# Patient Record
Sex: FEMALE | Race: WHITE | Employment: PART TIME | URBAN - METROPOLITAN AREA
[De-identification: names, ages, dates, MRNs, and addresses within clinical notes are randomized per-mention and may not be internally consistent; named-entity substitution may affect disease eponyms.]

---

## 2018-01-18 NOTE — MISCELLANEOUS
Chago Mehta,    It has come to my attention that we have lost follow-up/contact with you  Because you have an increase chance of developing breast cancer, we recommend that you have examinations by a physician or advanced practitioner twice a year  I understand that  this follow-up is sometimes inconvenient, but the [employer] recommends it as a standard of care  Please contact our office to coordinate this follow-up or let us know if you are under the care of another physician              Electronically signed by:Robert Killian MD  Aug 30 2016  9:31AM EST

## 2019-11-12 ENCOUNTER — TELEPHONE (OUTPATIENT)
Dept: SURGICAL ONCOLOGY | Facility: CLINIC | Age: 64
End: 2019-11-12

## 2019-11-12 NOTE — TELEPHONE ENCOUNTER
New Patient Encounter    New Patient Intake Form   Patient Details:  Sherrill Duke  1955  [id number]    Background Information:  [address] starts by opening a telephone encounter and gathering the following information   Who is calling to schedule? If not self, relationship to patient? 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 Hx of    Is patient aware of diagnosis? Yes   Reason for visit? History Of   Have you had any testing done? If so: when, where? Yes   Are records in [employer]? yes   Was the patient told to bring a disk? no   Scheduling Information:   Preferred [place]:  Any     Requesting Specific Provider? no   Are there any dates/time the patient cannot be seen? no      Miscellaneous: na   After completing the above information, please route to Financial Counselor and the appropriate Nurse Navigator for review

## 2019-11-18 ENCOUNTER — CONSULT (OUTPATIENT)
Dept: SURGICAL ONCOLOGY | Facility: CLINIC | Age: 64
End: 2019-11-18
Payer: COMMERCIAL

## 2019-11-18 VITALS
DIASTOLIC BLOOD PRESSURE: 90 MMHG | WEIGHT: 140.6 LBS | HEART RATE: 98 BPM | BODY MASS INDEX: 21.31 KG/M2 | TEMPERATURE: 97.4 F | HEIGHT: 68 IN | SYSTOLIC BLOOD PRESSURE: 178 MMHG

## 2019-11-18 DIAGNOSIS — Z80.3 FAMILY HISTORY OF BREAST CANCER: ICD-10-CM

## 2019-11-18 DIAGNOSIS — N64.4 BREAST PAIN: Primary | ICD-10-CM

## 2019-11-18 PROCEDURE — 99244 OFF/OP CNSLTJ NEW/EST MOD 40: CPT | Performed by: NURSE PRACTITIONER

## 2019-11-18 RX ORDER — EPINEPHRINE 0.3 MG/.3ML
INJECTION SUBCUTANEOUS
COMMUNITY
Start: 2011-04-25

## 2019-11-18 RX ORDER — BRIMONIDINE TARTRATE, TIMOLOL MALEATE 2; 5 MG/ML; MG/ML
SOLUTION/ DROPS OPHTHALMIC
COMMUNITY

## 2019-11-18 NOTE — PROGRESS NOTES
Surgical Oncology Follow Up       [address] [facility] [place] [place]  [facility] [address]    Cassandra Gould  1955  [id number]      Chief Complaint   Patient presents with    Consult       Assessment/Plan:  1  Breast pain  - Mammo diagnostic bilateral w 3d & cad; Future  - US breast left limited (diagnostic); Future  - 3 mo f/u visit  - Wear a supportive bra  Sports bras work well  - Decrease salty food intake  - Decrease caffeine intake  - NSAIDS for pain as needed  - Ice or heat/ massage  - Decrease smoking  - Call with worsening symptoms    2  Family history of breast cancer  - patient reports she had genetic testing performed in the past at [facility] as part of a study- states she received indeterminate results of the BRCA 1 gene but has not received any further updates  -encourage patient to attempt to obtain genetic testing results and then patient could possibly be tested with panel testing given her family history of sister being diagnosed with breast cancer at age 36 and a maternal grandmother diagnosed at an unknown but young age    Discussion/Summary:   Patient is a 36-year-old female with a family history of breast cancer and a personal history of left breast atypia who presents today with complaints left breast pain x2 weeks  She has not had any recent breast imaging  Her exam is somewhat difficult given her numerous scars and presumed scar tissue  I do not appreciate a dominant mass or any lymphadenopathy  I recommended a diagnostic bilateral mammogram and a left breast ultrasound for further evaluation  In regards to her family history, her sister was diagnosed at age 36  She is alive and well  The sister did not undergo genetic testing however the patient did undergo genetic testing as part of a trial several years ago at [facility]  She states that her results were indeterminate for BRCA 1 gene mutation    She reports that she has not received any further updates since that time  I have encouraged her to see if she is able to acquire her results  She would most likely benefit from panel testing  I will plan to see her back in 3 months to reassess her Genetics as well as her breast pain  I recommended conservative measures at this time  If her imaging is negative, I suspect that her pain is primarily from scar tissue  I did instruct her to call if she appreciates any further changes or has any other concerns  She is in agreement with this plan  All of her questions were answered today  History of Present Illness:     -Interval History:   Patient is a 52-year-old female who previously followed with Dr Frank Bobby for atypia  (2003) and mastodynia who has been lost to follow-up is presents today with new complaints of left breast pain  This has been ongoing for approximately 2 weeks  Ibuprofen and a well fitting bra helps with the pain  She is unsure if there are any changes on her self exam as she states she has had several surgeries and has scarring of the left breast   She has no recent breast imaging  Her last  mammogram was performed in November of 2015 and was BI-RADS 2, category 3 density  Menarche age 14, 1 pregnancies, 3 live births  She was 26 at the time her 1st child was born  She has used birth control pills in the past for about 15 years  She has never used hormone replacement  She has undergone a bilateral oophorectomy  She has no personal history of cancer aside from an early stage melanoma  Her sister was diagnosed with breast cancer at age 36 and her maternal grandmother was also diagnosed with breast cancer  She has a paternal aunt with a breast cancer diagnosis at age 80  The patient reports that she underwent BRCA genetic testing in the past which was indeterminate  She is not of Ashkenazi [place] descent  She is a current smoker and has smoked for 25 years    She is currently smoking a quarter pack per day   She consumes approximately 3 alcoholic beverages per week  Review of Systems:  Review of Systems   Constitutional: Negative for activity change, appetite change, chills, fatigue, fever and unexpected weight change  HENT: Negative for trouble swallowing  Eyes: Negative for pain, redness and visual disturbance  Respiratory: Negative for cough, shortness of breath and wheezing  Cardiovascular: Negative for chest pain, palpitations and leg swelling  Gastrointestinal: Negative for abdominal pain, constipation, diarrhea, nausea and vomiting  Endocrine: Negative for cold intolerance and heat intolerance  Musculoskeletal: Negative for arthralgias, back pain, gait problem and myalgias  Skin: Negative for color change and rash  Neurological: Negative for dizziness, syncope, light-headedness, numbness and headaches  Hematological: Negative for adenopathy  Psychiatric/Behavioral: Negative for agitation and confusion  All other systems reviewed and are negative  Patient Active Problem List   Diagnosis    Breast pain     History reviewed  No pertinent past medical history    Past Surgical History:   Procedure Laterality Date    DILATION AND CURETTAGE OF UTERUS      EYE SURGERY       Family History   Problem Relation Age of Onset    Breast cancer Sister     Breast cancer Paternal Aunt     Breast cancer Maternal Grandmother      Social History     Socioeconomic History    Marital status: Unknown     Spouse name: Not on file    Number of children: Not on file    Years of education: Not on file    Highest education level: Not on file   Occupational History    Not on file   Social Needs    Financial resource strain: Not on file    Food insecurity:     Worry: Not on file     Inability: Not on file    Transportation needs:     Medical: Not on file     Non-medical: Not on file   Tobacco Use    Smoking status: Current Every Day Smoker     Packs/day: 0 25     Years: 25 00     Pack years: 6 25     Types: Cigarettes    Smokeless tobacco: Never Used   Substance and Sexual Activity    Alcohol use: Yes     Frequency: 2-3 times a week     Drinks per session: 1 or 2    Drug use: Not on file    Sexual activity: Not on file   Lifestyle    Physical activity:     Days per week: Not on file     Minutes per session: Not on file    Stress: Not on file   Relationships    Social connections:     Talks on phone: Not on file     Gets together: Not on file     Attends Holiness service: Not on file     Active member of club or organization: Not on file     Attends meetings of clubs or organizations: Not on file     Relationship status: Not on file    Intimate partner violence:     Fear of current or ex partner: Not on file     Emotionally abused: Not on file     Physically abused: Not on file     Forced sexual activity: Not on file   Other Topics Concern    Not on file   Social History Narrative    Not on file       Current Outpatient Medications:     brimonidine-timolol (COMBIGAN) 0 2-0 5 %, Apply to eye, Disp: , Rfl:     EPINEPHrine (EPIPEN) 0 3 mg/0 3 mL SOAJ, Inject as directed, Disp: , Rfl:   Allergies   Allergen Reactions    Bee Venom Anaphylaxis     Vitals:    11/18/19 1334   BP: (!) 178/90   Pulse: 98   Temp: (!) 97 4 °F (36 3 °C)       Physical Exam   Constitutional: She is oriented to person, place, and time  Vital signs are normal  She appears well-developed and well-nourished  No distress  HENT:   Head: Normocephalic and atraumatic  Neck: Normal range of motion  Cardiovascular: Normal rate, regular rhythm and normal heart sounds  Pulmonary/Chest: Effort normal and breath sounds normal    Bilateral breasts were examined in the sitting and supine position  Left breast with several surgical scars present, primarily in the upper outer quadrant  There is presumed scar tissue present but I cannot r/o underlying pathology  No nipple changes or nipple discharge   There are no right breast masses, skin nodules, nipple changes or nipple discharge  There is no bilateral supraclavicular or axillary lymphadenopathy noted  Abdominal: Soft  Normal appearance  She exhibits no mass  There is no hepatosplenomegaly  There is no tenderness  Musculoskeletal: Normal range of motion  Lymphadenopathy:     She has no axillary adenopathy  Right: No supraclavicular adenopathy present  Left: No supraclavicular adenopathy present  Neurological: She is alert and oriented to person, place, and time  Skin: Skin is warm, dry and intact  No rash noted  She is not diaphoretic  Psychiatric: She has a normal mood and affect  Her speech is normal    Vitals reviewed  Advance Care Planning/Advance Directives:  Discussed disease status and treatment goals with the patient

## 2019-11-22 ENCOUNTER — HOSPITAL ENCOUNTER (OUTPATIENT)
Dept: ULTRASOUND IMAGING | Facility: CLINIC | Age: 64
Discharge: HOME | End: 2019-11-22
Payer: COMMERCIAL

## 2019-11-22 ENCOUNTER — HOSPITAL ENCOUNTER (OUTPATIENT)
Dept: MAMMOGRAPHY | Facility: CLINIC | Age: 64
Discharge: HOME | End: 2019-11-22
Payer: COMMERCIAL

## 2019-11-22 VITALS — BODY MASS INDEX: 21.22 KG/M2 | WEIGHT: 140 LBS | HEIGHT: 68 IN

## 2019-11-22 DIAGNOSIS — N64.4 BREAST PAIN: ICD-10-CM

## 2019-11-22 PROCEDURE — 77066 DX MAMMO INCL CAD BI: CPT

## 2019-11-22 PROCEDURE — 76642 ULTRASOUND BREAST LIMITED: CPT

## 2019-11-22 PROCEDURE — G0279 TOMOSYNTHESIS, MAMMO: HCPCS

## 2020-05-15 ENCOUNTER — TELEPHONE (OUTPATIENT)
Dept: SURGICAL ONCOLOGY | Facility: CLINIC | Age: 65
End: 2020-05-15

## 2020-05-27 ENCOUNTER — TELEPHONE (OUTPATIENT)
Dept: CARDIAC SURGERY | Facility: CLINIC | Age: 65
End: 2020-05-27

## 2021-03-01 DIAGNOSIS — Z23 ENCOUNTER FOR IMMUNIZATION: ICD-10-CM

## 2023-05-09 ENCOUNTER — HOSPITAL ENCOUNTER (OUTPATIENT)
Dept: RADIOLOGY | Facility: CLINIC | Age: 68
Discharge: HOME | End: 2023-05-09

## 2023-05-09 ENCOUNTER — OFFICE VISIT (OUTPATIENT)
Dept: URGENT CARE | Facility: CLINIC | Age: 68
End: 2023-05-09

## 2023-05-09 VITALS
DIASTOLIC BLOOD PRESSURE: 106 MMHG | BODY MASS INDEX: 18.05 KG/M2 | HEIGHT: 67 IN | WEIGHT: 115 LBS | SYSTOLIC BLOOD PRESSURE: 180 MMHG | HEART RATE: 78 BPM | OXYGEN SATURATION: 97 % | RESPIRATION RATE: 16 BRPM

## 2023-05-09 DIAGNOSIS — R07.81 RIB PAIN: Primary | ICD-10-CM

## 2023-05-09 DIAGNOSIS — R07.81 RIB PAIN: ICD-10-CM

## 2023-05-09 NOTE — LETTER
May 9, 2023     Patient: Kalpesh Cook   YOB: 1955   Date of Visit: 5/9/2023       To Whom It May Concern: It is my medical opinion that Kalpesh Cook may return to work on 5/12/2023  If you have any questions or concerns, please don't hesitate to call           Sincerely,        Noé Horta PA-C    CC: No Recipients

## 2023-05-09 NOTE — PROGRESS NOTES
[address]        NAME: Astrid Hobson is a 78 y o  female  : 1955    MRN: [id number]  DATE: May 9, 2023  TIME: 6:18 PM    Assessment and Plan   Rib pain [R07 81]  1  Rib pain  XR ribs right w pa chest min 3 views        Preliminary xray read by myself  No acute osseous abnormality noted  Possible fracture at rib 7 which could be old injury from December  It is not where she is having pain at this time  Pending radiologist final read  Information for PCP provided to follow up about high blood pressure  Patient Instructions     Heat/ice as needed   Tylenol/ibuprofen as needed for pain   Follow up with PCP in 3-5 days  Proceed to  ER if symptoms worsen  Chief Complaint     Chief Complaint   Patient presents with   • rib pain     Injured right posterior rib around oscar and today the exact pain returned and feels as if right lower rib is poking out         History of Present Illness       Patient is presenting today for right lower rib pain x 1 day  She stated she injured her right posterior rib around Oscar and today the exact same pain returned and stated that it feels like her right lower rib is poking out  Woke up this morning and had this sharp pain at her ribs  Doesn't recall any falls or injury  Denies any SOB or CP  Review of Systems   Review of Systems   Constitutional: Negative for activity change, chills, diaphoresis, fatigue and fever  Respiratory: Negative for chest tightness, shortness of breath and wheezing  Cardiovascular: Negative for chest pain, palpitations and leg swelling  Genitourinary: Negative  Musculoskeletal: Negative for back pain, gait problem, joint swelling, neck pain and neck stiffness  Right lower rib pain   Skin: Negative  Neurological: Negative for dizziness, tremors, syncope, weakness, light-headedness and numbness           Current Medications       Current Outpatient Medications:   •  brimonidine-timolol (COMBIGAN) "0 2-0 5 %, Apply to eye, Disp: , Rfl:   •  EPINEPHrine (EPIPEN) 0 3 mg/0 3 mL SOAJ, Inject as directed, Disp: , Rfl:     Current Allergies     Allergies as of 05/09/2023 - Reviewed 05/09/2023   Allergen Reaction Noted   • Bee venom Anaphylaxis 04/16/2019            The following portions of the patient's history were reviewed and updated as appropriate: allergies, current medications, past family history, past medical history, past social history, past surgical history and problem list      Past Medical History:   Diagnosis Date   • Glaucoma    • Iris nevus        Past Surgical History:   Procedure Laterality Date   • BREAST BIOPSY     • DILATION AND CURETTAGE OF UTERUS     • EYE SURGERY     • HYSTERECTOMY         Family History   Problem Relation Age of Onset   • Alzheimer's disease Mother    • Breast cancer Sister    • No Known Problems Sister    • No Known Problems Sister    • No Known Problems Daughter    • No Known Problems Maternal Aunt    • Breast cancer Paternal Aunt    • Breast cancer Maternal Grandmother          Medications have been verified  Objective   BP (!) 180/106   Pulse 78   Resp 16   Ht 5' 7\" (1 702 m)   Wt 52 2 kg (115 lb)   SpO2 97%   BMI 18 01 kg/m²        Physical Exam     Physical Exam  Constitutional:       Appearance: Normal appearance  HENT:      Head: Normocephalic  Eyes:      Extraocular Movements: Extraocular movements intact  Pupils: Pupils are equal, round, and reactive to light  Cardiovascular:      Rate and Rhythm: Normal rate and regular rhythm  Pulses: Normal pulses  Heart sounds: Normal heart sounds  Pulmonary:      Effort: Pulmonary effort is normal       Breath sounds: Normal breath sounds  Musculoskeletal:         General: Tenderness (right lower ribs) present  No swelling, deformity or signs of injury  Normal range of motion  Cervical back: Normal range of motion  No rigidity or tenderness  Skin:     General: Skin is warm and dry   " Capillary Refill: Capillary refill takes less than 2 seconds  Findings: No bruising, erythema or lesion  Neurological:      General: No focal deficit present  Mental Status: She is alert and oriented to person, place, and time  Mental status is at baseline  Psychiatric:         Mood and Affect: Mood normal          Behavior: Behavior normal          Thought Content:  Thought content normal          Judgment: Judgment normal

## 2023-05-09 NOTE — PATIENT INSTRUCTIONS
Heat/ice as needed   Tylenol/ibuprofen as needed for pain   Follow up with PCP in 3-5 days  Proceed to  ER if symptoms worsen

## 2023-05-10 ENCOUNTER — OFFICE VISIT (OUTPATIENT)
Dept: FAMILY MEDICINE CLINIC | Facility: CLINIC | Age: 68
End: 2023-05-10

## 2023-05-10 VITALS
BODY MASS INDEX: 18.06 KG/M2 | TEMPERATURE: 96.2 F | DIASTOLIC BLOOD PRESSURE: 100 MMHG | HEART RATE: 73 BPM | SYSTOLIC BLOOD PRESSURE: 170 MMHG | WEIGHT: 115.08 LBS | HEIGHT: 67 IN | OXYGEN SATURATION: 99 %

## 2023-05-10 DIAGNOSIS — Z12.11 SCREENING FOR COLORECTAL CANCER: ICD-10-CM

## 2023-05-10 DIAGNOSIS — Z82.49 FAMILY HISTORY OF EARLY CAD: ICD-10-CM

## 2023-05-10 DIAGNOSIS — Z72.0 TOBACCO USE: ICD-10-CM

## 2023-05-10 DIAGNOSIS — Z11.59 NEED FOR HEPATITIS C SCREENING TEST: ICD-10-CM

## 2023-05-10 DIAGNOSIS — Z12.31 SCREENING MAMMOGRAM FOR BREAST CANCER: ICD-10-CM

## 2023-05-10 DIAGNOSIS — I10 ESSENTIAL (PRIMARY) HYPERTENSION: ICD-10-CM

## 2023-05-10 DIAGNOSIS — I10 ESSENTIAL HYPERTENSION: ICD-10-CM

## 2023-05-10 DIAGNOSIS — D31.42 NEVUS OF IRIS OF LEFT EYE: ICD-10-CM

## 2023-05-10 DIAGNOSIS — H40.10X0 OPEN-ANGLE GLAUCOMA OF LEFT EYE, UNSPECIFIED GLAUCOMA STAGE, UNSPECIFIED OPEN-ANGLE GLAUCOMA TYPE: ICD-10-CM

## 2023-05-10 DIAGNOSIS — Z12.12 SCREENING FOR COLORECTAL CANCER: ICD-10-CM

## 2023-05-10 DIAGNOSIS — R07.81 RIB PAIN ON RIGHT SIDE: ICD-10-CM

## 2023-05-10 DIAGNOSIS — R03.0 ELEVATED BP WITHOUT DIAGNOSIS OF HYPERTENSION: Primary | ICD-10-CM

## 2023-05-10 DIAGNOSIS — J30.2 SEASONAL ALLERGIES: ICD-10-CM

## 2023-05-10 RX ORDER — AMLODIPINE BESYLATE 10 MG/1
10 TABLET ORAL DAILY
Qty: 30 TABLET | Refills: 5 | Status: SHIPPED | OUTPATIENT
Start: 2023-05-10

## 2023-05-10 NOTE — PROGRESS NOTES
Assessment/Plan:       1  Elevated BP without diagnosis of hypertension  -     Lipid panel; Future    2  Screening mammogram for breast cancer  -     Mammo screening bilateral w 3d & cad; Future; Expected date: 05/10/2023    3  Screening for colorectal cancer  -     Ambulatory Referral to Gastroenterology; Future; Expected date: 08/10/2023    4  Tobacco use    5  Open-angle glaucoma of left eye, unspecified glaucoma stage, unspecified open-angle glaucoma type    6  Seasonal allergies    7  Family history of early CAD  -     Lipid panel; Future    8  BMI less than 19,adult    9  Need for hepatitis C screening test  -     Hepatitis C Antibody; Future    10  Essential hypertension  -     Comprehensive metabolic panel; Future  -     amLODIPine (NORVASC) 10 mg tablet; Take 1 tablet (10 mg total) by mouth daily    11  Rib pain on right side    12  Essential (primary) hypertension  -     Lipid panel; Future    13  Nevus of iris of left eye      58-year-old female who is establishing care here at [facility] primary care  Patient was recently seen in urgent care due to ongoing right rib pain  In December 2022, patient was cleaning her house and struck the right side of her ribs against a table  Fracture was confirmed  She recently had a flare of the rib pain  X-rays showed healing fracture with callus formation of the right rib fracture with no new fracture being seen  Patient previously had a DEXA scan in June 2021 showing normal bone density  When seen in urgent care, patient's blood pressure was elevated above 948 systolically  In the office today she was 170/100 and repeated was 182/92  As part of shared decision making, the patient is willing to start amlodipine 10 mg nightly  She has not had any labs drawn for quite some time as she has not had any primary care services  She is willing to have a lot of screening test done including mammography, lipid panel, hepatitis C, CMP, and colonoscopy    Patient's sister was diagnosed with breast cancer at age 36  The sister opted out of getting BRCA testing  The patient did have genetic test at [facility] and was found to have indeterminant BRCA1 and BRCA2  She had been following with an oncologist here at [place] [address] and was having mammography is done on a regular basis  Due to COVID, she has not had a mammography since November 2020 but she is agreeing to have a mammography at this time  She has no breast complaints at this time  Patient continues to smoke  She is only smoking 3 to 4 cigarettes/day and she is in the precontemplation stage of stopping smoking  Patient continues to have poor vision in the left eye  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  She is able to use her right eye as needed for vision  She denies any difficulty driving but drives very little at night  Patient's BMI was 18  She has been thin all of her life  She states that she does get adequate calories  She has not lost any weight and her weight has been steady at 115 pounds for quite some time  BMI is below normal  Dietary education for weight gain was provided to the patient today  He is going to try to have dense calorie foods  We are checking her albumin and protein level on her CMP  A total of 62 minutes was spent rendering care for this patient  This time included review of the patient's electronic medical record, performing the history and physical, reviewing appropriate labs and/or images, developing a treatment and assessment plan, answering patient's questions and concerns, and documenting the patient visit  We will see the patient in 1 month to do her welcome to Medicare visit  Subjective:      Patient ID: Mikey Drummond is a 78 y o  female  HPI: This 72-year-old female is 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    This requires a lot of walking up and down stairs and squatting  She did have problems with patellar tendinitis in 2019 for which she saw Dr Ivelisse Morel  She is not having consistent pain just once in a while she would have pain which is relieved by taking a hot bath  She has no swelling in her knees  Her joints are holding up rather well  Patient never had a high blood pressure in the past   There is no family history of hypertension but there is some congenital heart disease in her sister affecting the mitral valve but the sister never went through any additional testing after she had had surgery when she was in third grade  Patient has not had problems with her heart in the past   No angina  She had never had a high blood pressure readings until most recently and her readings are now consistently elevated  Patient is willing to take her blood pressure at home by buying a new blood pressure cuff  She will report her blood pressures on a regular basis 2 or 3 times per week so that we can dose adjust the amlodipine or add additional therapy  I did prepare her for the great possibility that we would need to increase her blood pressure medication with a different agent if these blood pressure readings did not normalize  Patient denies chest pain heart palpitations dizziness lightheadedness or syncope  She continues to have rib pain on the right side but it is intermittent  Pain is not pleuritic  She has some point tenderness on the posterior ribs on the right side  Patient denies any melena or hematochezia  She denies any unintentional weight loss  She denies any diarrhea or constipation  She denies difficulty passing her water  She denies any breast pain or nipple discharge      The following portions of the patient's history were reviewed and updated as appropriate: allergies, current medications, past family history, past medical history, past social history, past surgical history, and problem list     Review of Systems  No "recent URI or viral syndrome  Very little swelling in her lower extremities  She is smoking 3 to 4 cigarettes/day and is in the precontemplation stage of stopping smoking  Objective:      /100 (BP Location: Right arm, Patient Position: Sitting)   Pulse 73   Temp (!) 96 2 °F (35 7 °C) (Tympanic)   Ht 5' 7\" (1 702 m)   Wt 52 2 kg (115 lb 1 3 oz)   SpO2 99%   BMI 18 02 kg/m²          Physical Exam  Well-developed well-nourished 78y o  year old female who is cooperative with the exam   Patient is alert and oriented x3  Patient is appropriate in answering all questions  HEENT:  Normocephalic  PERRLA  EOMs intact  TMs are clear with identification of bony landmarks  No tragus or pinnae tenderness  No pre or posterior auricular adenopathy  Sinuses without tenderness  Throat without hyperemia  Neck:  Supple without adenopathy  Thyroid midline without thyromegaly or bruits  No carotid bruits  Chest symmetric and nontender  Barrel chested noted  She was aerating her bases without difficulty  Was able to speak in full sentences  Does have tenderness on palpation on the posterior right ribs  Tenderness was present generally and not as a point tenderness  Heart regular rate and rhythm  No murmur rubs or gallops  Point of maximum impulse not displaced  Lungs are clear to auscultation  Breathing is nonlabored  Aerating bases well  Abdomen round and soft positive bowel sounds without masses tenderness or organomegaly  Extremities reveal adequate peripheral pulses without peripheral edema  Mild varicosities noted in both ankle areas    "

## 2023-05-11 ENCOUNTER — TELEPHONE (OUTPATIENT)
Dept: URGENT CARE | Facility: CLINIC | Age: 68
End: 2023-05-11

## 2023-05-11 NOTE — TELEPHONE ENCOUNTER
Left message for patient that there was a ninth rib fracture seen that was healing, most likely from December's injury, but there was no acute fracture  Advised if she has any questions or concerns to call the office

## 2023-06-14 ENCOUNTER — HOSPITAL ENCOUNTER (OUTPATIENT)
Dept: RADIOLOGY | Facility: CLINIC | Age: 68
Discharge: HOME | End: 2023-06-14
Payer: MEDICARE

## 2023-06-14 VITALS — HEIGHT: 67 IN | WEIGHT: 115.08 LBS | BODY MASS INDEX: 18.06 KG/M2

## 2023-06-14 DIAGNOSIS — Z12.31 SCREENING MAMMOGRAM FOR BREAST CANCER: ICD-10-CM

## 2023-06-14 PROCEDURE — 77063 BREAST TOMOSYNTHESIS BI: CPT

## 2023-06-14 PROCEDURE — 77067 SCR MAMMO BI INCL CAD: CPT

## 2023-06-20 ENCOUNTER — TELEPHONE (OUTPATIENT)
Dept: FAMILY MEDICINE CLINIC | Facility: CLINIC | Age: 68
End: 2023-06-20

## 2023-06-26 ENCOUNTER — OFFICE VISIT (OUTPATIENT)
Dept: FAMILY MEDICINE CLINIC | Facility: CLINIC | Age: 68
End: 2023-06-26
Payer: MEDICARE

## 2023-06-26 VITALS
HEART RATE: 93 BPM | TEMPERATURE: 98.2 F | OXYGEN SATURATION: 100 % | DIASTOLIC BLOOD PRESSURE: 76 MMHG | WEIGHT: 112.66 LBS | BODY MASS INDEX: 17.68 KG/M2 | HEIGHT: 67 IN | SYSTOLIC BLOOD PRESSURE: 138 MMHG

## 2023-06-26 DIAGNOSIS — Z23 NEED FOR VACCINATION AGAINST STREPTOCOCCUS PNEUMONIAE USING PNEUMOCOCCAL CONJUGATE VACCINE 13: ICD-10-CM

## 2023-06-26 DIAGNOSIS — Z23 ENCOUNTER FOR IMMUNIZATION: ICD-10-CM

## 2023-06-26 DIAGNOSIS — Z12.12 SCREENING FOR COLORECTAL CANCER: ICD-10-CM

## 2023-06-26 DIAGNOSIS — I10 ESSENTIAL HYPERTENSION: ICD-10-CM

## 2023-06-26 DIAGNOSIS — Z00.00 MEDICARE ANNUAL WELLNESS VISIT, INITIAL: Primary | ICD-10-CM

## 2023-06-26 DIAGNOSIS — M80.071K: ICD-10-CM

## 2023-06-26 DIAGNOSIS — M76.52 PATELLAR TENDINITIS OF LEFT KNEE: ICD-10-CM

## 2023-06-26 DIAGNOSIS — Z12.11 SCREENING FOR COLORECTAL CANCER: ICD-10-CM

## 2023-06-26 DIAGNOSIS — H40.051 INCREASED INTRAOCULAR PRESSURE, RIGHT: ICD-10-CM

## 2023-06-26 DIAGNOSIS — M81.0 AGE-RELATED OSTEOPOROSIS WITHOUT CURRENT PATHOLOGICAL FRACTURE: ICD-10-CM

## 2023-06-26 DIAGNOSIS — H25.9 SENILE CATARACT OF RIGHT EYE, UNSPECIFIED AGE-RELATED CATARACT TYPE: ICD-10-CM

## 2023-06-26 PROCEDURE — 90677 PCV20 VACCINE IM: CPT

## 2023-06-26 PROCEDURE — G0402 INITIAL PREVENTIVE EXAM: HCPCS | Performed by: PHYSICIAN ASSISTANT

## 2023-06-26 PROCEDURE — G0009 ADMIN PNEUMOCOCCAL VACCINE: HCPCS

## 2023-06-26 NOTE — PROGRESS NOTES
Assessment and Plan:     Problem List Items Addressed This Visit    None       Preventive health issues were discussed with patient, and age appropriate screening tests were ordered as noted in patient's After Visit Summary  Personalized health advice and appropriate referrals for health education or preventive services given if needed, as noted in patient's After Visit Summary       History of Present Illness:     Patient presents for a Medicare Wellness Visit    HPI   Patient Care Team:  Marlen Cullen PA-C as PCP - General (Physician Assistant)  Ryan Spence MD     Review of Systems:     Review of Systems     Problem List:     Patient Active Problem List   Diagnosis   • Breast pain   • Family history of breast cancer      Past Medical and Surgical History:     Past Medical History:   Diagnosis Date   • BRCA1 negative     patient states that BRCA 1 came back indeterminate   • BRCA2 negative    • Glaucoma    • Iris nevus    • Melanoma, intraocular, iris, left ([facility] [address] ) 12/21/2006    iris nevus tumor removed     Past Surgical History:   Procedure Laterality Date   • BREAST EXCISIONAL BIOPSY Left 1975    age 26; benign lesion   • BREAST EXCISIONAL BIOPSY Left 01/04/2000    atypical hyperplasia   • BREAST EXCISIONAL BIOPSY Left 01/30/2002    atypical intraductal hyperplasia   • BREAST EXCISIONAL BIOPSY Left 04/17/2002    intraductal epithelial hyperplasia and papillomatosis with focal atypia   • BREAST EXCISIONAL BIOPSY Left 02/25/2003    foci of atypical intraductal hyperplasia   • BREAST EXCISIONAL BIOPSY Left 06/05/2006    fibrocystic change/fibrous mastopathy   • BREAST EXCISIONAL BIOPSY Left 02/25/2008    fibrocystic changes   • DILATION AND CURETTAGE OF UTERUS     • EYE SURGERY Left 12/21/2006   • HYSTERECTOMY  02/2000   • OOPHORECTOMY Bilateral 2013    approximately   • [address] ADDITIONAL Left 04/28/2008    benign breast tissue with stromal fibrosis and sclerosing adenosis   • US GUIDED BREAST BIOPSY LEFT COMPLETE Left 04/28/2008    benign breast tissue with stromal fibrosis and sclerosing adenosis      Family History:     Family History   Problem Relation Age of Onset   • Alzheimer's disease Mother    • No Known Problems Father    • Breast cancer Sister 43   • No Known Problems Sister    • No Known Problems Sister    • No Known Problems Daughter    • Breast cancer Maternal Grandmother 45   • No Known Problems Maternal Grandfather    • No Known Problems Paternal Grandmother    • No Known Problems Paternal Grandfather    • No Known Problems Maternal Aunt    • No Known Problems Maternal Aunt    • Breast cancer Paternal Aunt         80   • Breast cancer Cousin 40   • Breast cancer Cousin 39   • Breast cancer additional onset Neg Hx    • BRCA2 Positive Neg Hx    • BRCA2 Negative Neg Hx    • BRCA1 Positive Neg Hx    • BRCA1 Negative Neg Hx    • BRCA 1/2 Neg Hx    • Colon cancer Neg Hx    • Ovarian cancer Neg Hx    • Endometrial cancer Neg Hx       Social History:     Social History     Socioeconomic History   • Marital status: /Civil Union     Spouse name: None   • Number of children: None   • Years of education: None   • Highest education level: None   Occupational History   • None   Tobacco Use   • Smoking status: Every Day     Packs/day: 0 25     Years: 25 00     Total pack years: 6 25     Types: Cigarettes   • Smokeless tobacco: Never   Substance and Sexual Activity   • Alcohol use:  Yes   • Drug use: None   • Sexual activity: None   Other Topics Concern   • None   Social History Narrative   • None     Social Determinants of Health     Financial Resource Strain: Not on file   Food Insecurity: Not on file   Transportation Needs: Not on file   Physical Activity: Not on file   Stress: Not on file   Social Connections: Not on file   Intimate Partner Violence: Not on file   Housing Stability: Not on file      Medications and Allergies:     Current Outpatient Medications   Medication Sig Dispense Refill   • amLODIPine (NORVASC) 10 mg tablet Take 1 tablet (10 mg total) by mouth daily 30 tablet 5   • brimonidine-timolol (COMBIGAN) 0 2-0 5 % Apply to eye     • EPINEPHrine (EPIPEN) 0 3 mg/0 3 mL SOAJ Inject as directed     • prednisoLONE acetate (PRED MILD) 0 12 % ophthalmic suspension 1 drop 4 (four) times a day       No current facility-administered medications for this visit  Allergies   Allergen Reactions   • Bee Venom Anaphylaxis   • Dayquil [Pseudoephedrine-Dm-Gg-Apap] Other (See Comments)      Immunizations:     Immunization History   Administered Date(s) Administered   • INFLUENZA 12/18/2007, 12/16/2009, 11/08/2011, 01/16/2013, 01/17/2018   • Pneumococcal Polysaccharide PPV23 05/22/2017   • Tdap 11/08/2011, 02/28/2012      Health Maintenance:         Topic Date Due   • Hepatitis C Screening  Never done   • Colorectal Cancer Screening  Never done   • Breast Cancer Screening: Mammogram  06/14/2024         Topic Date Due   • COVID-19 Vaccine (1) Never done   • Pneumococcal Vaccine: 65+ Years (2 - PCV) 05/22/2018   • Influenza Vaccine (Season Ended) 09/01/2023      Medicare Screening Tests and Risk Assessments:         Health Risk Assessment:   Patient rates overall health as very good  Patient feels that their physical health rating is same  Patient is very satisfied with their life  Eyesight was rated as slightly worse  Hearing was rated as same  Patient feels that their emotional and mental health rating is same  Patients states they are never, rarely angry  Patient states they are never, rarely unusually tired/fatigued  Pain experienced in the last 7 days has been none  Patient states that she has experienced no weight loss or gain in last 6 months  Fall Risk Screening: In the past year, patient has experienced: no history of falling in past year      Urinary Incontinence Screening:   Patient has not leaked urine accidently in the last six months       Home Safety:  Patient does not "have trouble with stairs inside or outside of their home  Patient has working smoke alarms and has no working carbon monoxide detector  Home safety hazards include: none  Medications:   Patient is currently taking over-the-counter supplements  OTC medications include: see medication list  Patient is able to manage medications  Activities of Daily Living (ADLs)/Instrumental Activities of Daily Living (IADLs):   Walk and transfer into and out of bed and chair?: Yes  Dress and groom yourself?: Yes    Bathe or shower yourself?: Yes    Feed yourself? Yes  Do your laundry/housekeeping?: No  Manage your money, pay your bills and track your expenses?: Yes  Make your own meals?: Yes    Do your own shopping?: Yes    Previous Hospitalizations:   Any hospitalizations or ED visits within the last 12 months?: No      PREVENTIVE SCREENINGS        Breast Cancer Screening:     General: Screening Current      Cervical Cancer Screening:    General: Screening Not Indicated      Lung Cancer Screening:     General: Screening Not Indicated    No results found       Physical Exam:     /76 (BP Location: Right arm, Patient Position: Sitting, Cuff Size: Standard)   Pulse 93   Temp 98 2 °F (36 8 °C) (Tympanic)   Ht 5' 7\" (1 702 m)   Wt 51 1 kg (112 lb 10 5 oz)   SpO2 100%   BMI 17 64 kg/m²     Physical Exam     Ruth Cardenas PA-C  "

## 2023-06-26 NOTE — PATIENT INSTRUCTIONS
Medicare Preventive Visit Patient Instructions  Thank you for completing your Welcome to Medicare Visit or Medicare Annual Wellness Visit today  Your next wellness visit will be due in one year (6/26/2024)  The screening/preventive services that you may require over the next 5-10 years are detailed below  Some tests may not apply to you based off risk factors and/or age  Screening tests ordered at today's visit but not completed yet may show as past due  Also, please note that scanned in results may not display below  Preventive Screenings:  Service Recommendations Previous Testing/Comments   Colorectal Cancer Screening  * Colonoscopy    * Fecal Occult Blood Test (FOBT)/Fecal Immunochemical Test (FIT)  * Fecal DNA/Cologuard Test  * Flexible Sigmoidoscopy Age: 39-70 years old   Colonoscopy: every 10 years (may be performed more frequently if at higher risk)  OR  FOBT/FIT: every 1 year  OR  Cologuard: every 3 years  OR  Sigmoidoscopy: every 5 years  Screening may be recommended earlier than age 39 if at higher risk for colorectal cancer  Also, an individualized decision between you and your healthcare provider will decide whether screening between the ages of 74-80 would be appropriate  Colonoscopy: Not on file  FOBT/FIT: Not on file  Cologuard: Not on file  Sigmoidoscopy: Not on file          Breast Cancer Screening Age: 36 years old  Frequency: every 1-2 years  Not required if history of left and right mastectomy Mammogram: 06/14/2023    Screening Current   Cervical Cancer Screening Between the ages of 21-29, pap smear recommended once every 3 years  Between the ages of 33-67, can perform pap smear with HPV co-testing every 5 years     Recommendations may differ for women with a history of total hysterectomy, cervical cancer, or abnormal pap smears in past  Pap Smear: Not on file    Screening Not Indicated   Hepatitis C Screening Once for adults born between 1945 and 1965  More frequently in patients at high risk for Hepatitis C Hep C Antibody: Not on file        Diabetes Screening 1-2 times per year if you're at risk for diabetes or have pre-diabetes Fasting glucose: No results in last 5 years (No results in last 5 years)  A1C: No results in last 5 years (No results in last 5 years)      Cholesterol Screening Once every 5 years if you don't have a lipid disorder  May order more often based on risk factors  Lipid panel: Not on file          Other Preventive Screenings Covered by Medicare:  1  Abdominal Aortic Aneurysm (AAA) Screening: covered once if your at risk  You're considered to be at risk if you have a family history of AAA  2  Lung Cancer Screening: covers low dose CT scan once per year if you meet all of the following conditions: (1) Age 50-69; (2) No signs or symptoms of lung cancer; (3) Current smoker or have quit smoking within the last 15 years; (4) You have a tobacco smoking history of at least 20 pack years (packs per day multiplied by number of years you smoked); (5) You get a written order from a healthcare provider  3  Glaucoma Screening: covered annually if you're considered high risk: (1) You have diabetes OR (2) Family history of glaucoma OR (3)  aged 48 and older OR (3)  American aged 72 and older  3  Osteoporosis Screening: covered every 2 years if you meet one of the following conditions: (1) You're estrogen deficient and at risk for osteoporosis based off medical history and other findings; (2) Have a vertebral abnormality; (3) On glucocorticoid therapy for more than 3 months; (4) Have primary hyperparathyroidism; (5) On osteoporosis medications and need to assess response to drug therapy  · Last bone density test (DXA Scan): Not on file  5  HIV Screening: covered annually if you're between the age of 12-76  Also covered annually if you are younger than 13 and older than 72 with risk factors for HIV infection   For pregnant patients, it is covered up to 3 times per pregnancy  Immunizations:  Immunization Recommendations   Influenza Vaccine Annual influenza vaccination during flu season is recommended for all persons aged >= 6 months who do not have contraindications   Pneumococcal Vaccine   * Pneumococcal conjugate vaccine = PCV13 (Prevnar 13), PCV15 (Vaxneuvance), PCV20 (Prevnar 20)  * Pneumococcal polysaccharide vaccine = PPSV23 (Pneumovax) Adults 25-60 years old: 1-3 doses may be recommended based on certain risk factors  Adults 72 years old: 1-2 doses may be recommended based off what pneumonia vaccine you previously received   Hepatitis B Vaccine 3 dose series if at intermediate or high risk (ex: diabetes, end stage renal disease, liver disease)   Tetanus (Td) Vaccine - COST NOT COVERED BY MEDICARE PART B Following completion of primary series, a booster dose should be given every 10 years to maintain immunity against tetanus  Td may also be given as tetanus wound prophylaxis  Tdap Vaccine - COST NOT COVERED BY MEDICARE PART B Recommended at least once for all adults  For pregnant patients, recommended with each pregnancy  Shingles Vaccine (Shingrix) - COST NOT COVERED BY MEDICARE PART B  2 shot series recommended in those aged 48 and above     Health Maintenance Due:      Topic Date Due   • Hepatitis C Screening  Never done   • Colorectal Cancer Screening  Never done   • Breast Cancer Screening: Mammogram  06/14/2024     Immunizations Due:      Topic Date Due   • COVID-19 Vaccine (1) Never done   • Influenza Vaccine (Season Ended) 09/01/2023     Advance Directives   What are advance directives? Advance directives are legal documents that state your wishes and plans for medical care  These plans are made ahead of time in case you lose your ability to make decisions for yourself  Advance directives can apply to any medical decision, such as the treatments you want, and if you want to donate organs  What are the types of advance directives?   There are many types of advance directives, and each state has rules about how to use them  You may choose a combination of any of the following:  · Living will: This is a written record of the treatment you want  You can also choose which treatments you do not want, which to limit, and which to stop at a certain time  This includes surgery, medicine, IV fluid, and tube feedings  · Durable power of  for healthcare [facility]): This is a written record that states who you want to make healthcare choices for you when you are unable to make them for yourself  This person, called a proxy, is usually a family member or a friend  You may choose more than 1 proxy  · Do not resuscitate (DNR) order:  A DNR order is used in case your heart stops beating or you stop breathing  It is a request not to have certain forms of treatment, such as CPR  A DNR order may be included in other types of advance directives  · Medical directive: This covers the care that you want if you are in a coma, near death, or unable to make decisions for yourself  You can list the treatments you want for each condition  Treatment may include pain medicine, surgery, blood transfusions, dialysis, IV or tube feedings, and a ventilator (breathing machine)  · Values history: This document has questions about your views, beliefs, and how you feel and think about life  This information can help others choose the care that you would choose  Why are advance directives important? An advance directive helps you control your care  Although spoken wishes may be used, it is better to have your wishes written down  Spoken wishes can be misunderstood, or not followed  Treatments may be given even if you do not want them  An advance directive may make it easier for your family to make difficult choices about your care  Cigarette Smoking and Your Health   Risks to your health if you smoke:  Nicotine and other chemicals found in tobacco damage every cell in your body   Even if you are a light smoker, you have an increased risk for cancer, heart disease, and lung disease  If you are pregnant or have diabetes, smoking increases your risk for complications  Benefits to your health if you stop smoking:   · You decrease respiratory symptoms such as coughing, wheezing, and shortness of breath  · You reduce your risk for cancers of the lung, mouth, throat, kidney, bladder, pancreas, stomach, and cervix  If you already have cancer, you increase the benefits of chemotherapy  You also reduce your risk for cancer returning or a second cancer from developing  · You reduce your risk for heart disease, blood clots, heart attack, and stroke  · You reduce your risk for lung infections, and diseases such as pneumonia, asthma, chronic bronchitis, and emphysema  · Your circulation improves  More oxygen can be delivered to your body  If you have diabetes, you lower your risk for complications, such as kidney, artery, and eye diseases  You also lower your risk for nerve damage  Nerve damage can lead to amputations, poor vision, and blindness  · You improve your body's ability to heal and to fight infections  For more information and support to stop smoking:   · [employer]  Phone: 7- 365 - 807-5361  Web Address: [employer]  Underweight  Underweight is defined as having a body mass index (BMI) of less than 18 5 kg/m2   Anorexia  is a loss of appetite, decreased food intake, or both  Your appetite naturally decreases as you get older  You also get full faster than you used to  This occurs because your body needs less energy  Other body changes can also lead to a decreased appetite  Even though some appetite loss is normal, you still need to get enough calories and nutrients to keep you healthy  You can start to lose too much weight if you do not eat as much food as your body needs  Unwanted weight loss can cause health problems, or worsen health problems you already have   You can also become dehydrated if you do not drink enough liquid  How to eat healthy and get enough nutrients:   · Choose healthy foods  Eat a variety of fruits, vegetables, whole grains, low-fat dairy foods, lean meats, and other protein foods  Limit foods high in fat, sugar, and salt  Limit or avoid alcohol as directed  Work with a dietitian to help you plan your meals if you need to follow a special diet  A dietitian can also teach you how to modify foods if you have trouble chewing or swallowing  · Snack on healthy foods between meals  if you only eat a small amount during meals  Snacks provide extra healthy nutrients and calories between meals  Examples include fruit, cheese, and whole grain crackers  · Drink liquids as directed  to avoid dehydration  Drink liquids between meals if they cause you to get full too quickly during meals  Ask how much liquid to drink each day and which liquids are best for you  · Use herbs, spices, and flavor enhancers to add flavor to foods  Avoid using herbs and spice blends that also contain sodium  Ask your healthcare provider or dietitian about flavor enhancers  Flavor enhancers with ham, natural loaiza, and roast beef flavors can also be sprinkled on food to add flavor  · Share meals with others as often as you can  Eating with others may help you to eat better during meal time  Ask family members, neighbors, or friends to join you for lunch  There are also senior centers where you can meet people, and share meals with them  · Ask family and friends for help  with shopping or preparing foods  Ask for a ride to the grocery store, if needed  © Copyright [employer] 2018 Information is for End User's use only and may not be sold, redistributed or otherwise used for commercial purposes   All illustrations and images included in CareNotes® are the copyrighted property of A D A M , [employer]  or [address] [place], [place] [address]

## 2023-06-26 NOTE — PROGRESS NOTES
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, subsequent encounter        Relevant Orders    DXA bone density spine hip and pelvis    Age-related osteoporosis without current pathological fracture        Encounter for immunization        Essential hypertension        BMI less than 19,adult             Patient's initial blood pressure when first coming in to the office was elevated  After sitting for 10 minutes, blood pressure was well controlled at 138/76  She is going to take several blood pressure readings at home with her wrist cuff and will bring the readings in when she sees me in 4 weeks  She also did not get her lipid profile chemistry profile or hepatitis C testing that was suggested  Have these labs done before she sees me in 4 weeks  It appears that she does have some whitecoat hypertension as her systolic pressure was 360 seen at her eye doctors a week ago  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, 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  Patient admits to having a type A personality and is very driven at work  She tries to keep the shoe department very organized and at times, she can feel her blood pressure increasing  She did take a personal day last week due to some increased stress at work and when she checked her blood pressure at home, her systolic pressure was 323    She is adherent with taking amlodipine 10 mg nightly  When she comes in with her further blood pressure readings, we talked about appropriate blood pressure treatment with the use of an ACE inhibitor so that a diuretic would not interfere with her work  Patient had a fracture from incidental trauma in her right foot about 12 years ago  She did have osteoporosis screening in 2021 which was normal at that time  We will repeat this screening to assess her bone density as she is very thin with a BMI of 17 64  BMI is below normal  Dietary education for weight gain was provided to the patient today  Patient lost 3 pounds since her last visit  She is eating adequate calories but has been moving around a lot more at work as they continue to be short staffed  Patient had a Pneumovax 23 6 years ago  She was willing to get Prevnar 20 which will be her last pneumonia shot  She smokes about 2 cigarettes a day and has smoked most of her adult life but was never a heavy smoker in the past so she does not qualify for lung cancer screening  Patient states that many years ago she had a colonoscopy and it was a very bad experience for her  She is willing to do the Cologuard test to assess for colorectal cancer  Risks and benefits were given  Patient has amblyopia of her left eye with strabismus  She is now having problems with her right eye with both the cataract and increased intraocular pressure  She is taking Combigan eyedrops but her intraocular pressure altagracia from 20-29  She has [facility]  Patient has patellar tendinitis left knee that occasionally flares  It has not acted up recently but becomes a problem primarily with activities related to work  A total of 60 minutes was spent rendering care for this patient    This time included review of the patient's electronic medical record, performing the history and physical, reviewing appropriate labs and/or images, developing a treatment and assessment plan, answering patient's questions and concerns, and documenting the patient visit  I will see her in 4 weeks to reassess her lab studies and blood pressure readings  Preventive health issues were discussed with patient, and age appropriate screening tests were ordered as noted in patient's After Visit Summary  Personalized health advice and appropriate referrals for health education or preventive services given if needed, as noted in patient's After Visit Summary  History of Present Illness:     Patient presents for a Medicare Wellness Visit    HPI we discussed her Medicare questionnaire  She is going to check with her  with regard to carbon monoxide detectors in the house  She is sure she has a radon detector  She also has smoke detectors  The reason that she does not do her own laundry is that her  is retired and he takes care of the laundry  She continues to do all the cooking as he is not a very good cook  She is not having any pain in her chest heart palpitations dizziness lightheadedness  No syncope or presyncope  Her legs are holding up pretty well  She does not have a lot of arthralgias  She is not having any pain in the fracture of the right foot that she had about a dozen years ago  Patient Care Team:  Kervin Schroeder as PCP - General (Physician Assistant)  Beatrice Ozuna MD     Review of Systems:     Review of Systems no recent URI or viral syndrome  Did miss some work with concerns over higher blood pressure but was found to have a blood pressure at home of 526 systolically  She knows that she needs to not nguyễn around at work and that she department quite as much but she feels very compulsive to arranging the shoes in a perfect manner and is very 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       Problem List:     Patient Active Problem List   Diagnosis   • Breast pain   • Family history of breast cancer      Past Medical and Surgical History:     Past Medical History:   Diagnosis Date   • BRCA1 negative     patient states that BRCA 1 came back indeterminate   • BRCA2 negative    • Glaucoma    • Iris nevus    • Melanoma, intraocular, iris, 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    iris nevus tumor removed     Past Surgical History:   Procedure Laterality Date   • BREAST EXCISIONAL BIOPSY Left 1975    age 26; benign lesion   • BREAST EXCISIONAL BIOPSY Left 01/04/2000    atypical hyperplasia   • BREAST EXCISIONAL BIOPSY Left 01/30/2002    atypical intraductal hyperplasia   • BREAST EXCISIONAL BIOPSY Left 04/17/2002    intraductal epithelial hyperplasia and papillomatosis with focal atypia   • BREAST EXCISIONAL BIOPSY Left 02/25/2003    foci of atypical intraductal hyperplasia   • BREAST EXCISIONAL BIOPSY Left 06/05/2006    fibrocystic change/fibrous mastopathy   • BREAST EXCISIONAL BIOPSY Left 02/25/2008    fibrocystic changes   • DILATION AND CURETTAGE OF UTERUS     • EYE SURGERY Left 12/21/2006   • HYSTERECTOMY  02/2000   • OOPHORECTOMY Bilateral 2013    approximately   • US GUIDANCE BREAST BIOPSY LEFT EACH ADDITIONAL Left 04/28/2008    benign breast tissue with stromal fibrosis and sclerosing adenosis   • US GUIDED BREAST BIOPSY LEFT COMPLETE Left 04/28/2008    benign breast tissue with stromal fibrosis and sclerosing adenosis      Family History:     Family History   Problem Relation Age of Onset   • Alzheimer's disease Mother    • No Known Problems Father    • Breast cancer Sister 43   • No Known Problems Sister    • No Known Problems Sister    • No Known Problems Daughter    • Breast cancer Maternal Grandmother 45   • No Known Problems Maternal Grandfather    • No Known Problems Paternal Grandmother    • No Known Problems Paternal Grandfather    • No Known Problems Maternal Aunt    • No Known Problems Maternal Aunt    • Breast cancer Paternal Aunt         80   • Breast cancer Cousin 40   • Breast cancer Cousin 39   • Breast cancer additional onset Neg Hx    • BRCA2 Positive Neg Hx    • BRCA2 Negative Neg Hx    • BRCA1 Positive Neg Hx    • BRCA1 Negative Neg Hx    • BRCA 1/2 Neg Hx    • Colon cancer Neg Hx    • Ovarian cancer Neg Hx    • Endometrial cancer Neg Hx       Social History:     Social History     Socioeconomic History   • Marital status: /Civil Union     Spouse name: None   • Number of children: None   • Years of education: None   • Highest education level: None   Occupational History   • None   Tobacco Use   • Smoking status: Every Day     Packs/day: 0 25     Years: 25 00     Total pack years: 6 25     Types: Cigarettes   • Smokeless tobacco: Never   Substance and Sexual Activity   • Alcohol use: Yes   • Drug use: None   • Sexual activity: None   Other Topics Concern   • None   Social History Narrative   • None     Social Determinants of Health     Financial Resource Strain: Low Risk  (6/26/2023)    Overall Financial Resource Strain (CARDIA)    • Difficulty of Paying Living Expenses: Not very hard   Food Insecurity: Not on file   Transportation Needs: No Transportation Needs (6/26/2023)    PRAPARE - Transportation    • Lack of Transportation (Medical): No    • Lack of Transportation (Non-Medical): No   Physical Activity: Not on file   Stress: Not on file   Social Connections: Not on file   Intimate Partner Violence: Not on file   Housing Stability: Not on file      Medications and Allergies:     Current Outpatient Medications   Medication Sig Dispense Refill   • amLODIPine (NORVASC) 10 mg tablet Take 1 tablet (10 mg total) by mouth daily 30 tablet 5   • brimonidine-timolol (COMBIGAN) 0 2-0 5 % Apply to eye     • EPINEPHrine (EPIPEN) 0 3 mg/0 3 mL SOAJ Inject as directed     • prednisoLONE acetate (PRED MILD) 0 12 % ophthalmic suspension 1 drop 4 (four) times a day       No current facility-administered medications for this visit       Allergies   Allergen Reactions   • Bee Venom Anaphylaxis   • Dayquil "[Pseudoephedrine-Dm-Gg-Apap] Other (See Comments)      Immunizations:     Immunization History   Administered Date(s) Administered   • INFLUENZA 12/18/2007, 12/16/2009, 11/08/2011, 01/16/2013, 01/17/2018   • Pneumococcal Conjugate Vaccine 20-valent (Pcv20), Polysace 06/26/2023   • Pneumococcal Polysaccharide PPV23 05/22/2017   • Tdap 11/08/2011, 02/28/2012      Health Maintenance:         Topic Date Due   • Hepatitis C Screening  Never done   • Colorectal Cancer Screening  Never done   • Breast Cancer Screening: Mammogram  06/14/2024         Topic Date Due   • COVID-19 Vaccine (1) Never done   • Influenza Vaccine (Season Ended) 09/01/2023      Medicare Screening Tests and Risk Assessments:     Annual Wellness Visit  No results found  Discussed results charted by the medical assistant  Physical Exam:     /76 (BP Location: Right arm, Patient Position: Sitting, Cuff Size: Standard)   Pulse 93   Temp 98 2 °F (36 8 °C) (Tympanic)   Ht 5' 7\" (1 702 m)   Wt 51 1 kg (112 lb 10 5 oz)   SpO2 100%   BMI 17 64 kg/m²     Physical Exam well-developed well-nourished pleasant 60-year-old female who is alert and oriented x3  Blood pressure after rest was acceptable and she is adherent with her blood pressure medications  Obvious amblyopia with the left eye medially deviated along the midline  Patient's heart is regular rate without murmur rub or gallops  Lungs are clear to auscultation  Left knee without tenderness today despite having history of patellar tendinitis  No peripheral edema and adequate peripheral pulses      Lorraine Gross PA-C  "

## 2023-07-27 ENCOUNTER — TELEPHONE (OUTPATIENT)
Dept: OTHER | Facility: OTHER | Age: 68
End: 2023-07-27

## 2023-07-27 NOTE — TELEPHONE ENCOUNTER
Patient is calling regarding cancelling an appointment.     Date/Time: 07/27/23 / 8:220    Patient was rescheduled: YES [] NO [x]    Patient requesting call back to reschedule: YES [] NO [x]

## 2023-07-29 DIAGNOSIS — I10 ESSENTIAL HYPERTENSION: ICD-10-CM

## 2023-07-31 RX ORDER — AMLODIPINE BESYLATE 10 MG/1
10 TABLET ORAL DAILY
Qty: 90 TABLET | Refills: 5 | Status: SHIPPED | OUTPATIENT
Start: 2023-07-31

## 2023-08-24 ENCOUNTER — APPOINTMENT (OUTPATIENT)
Dept: LAB | Facility: HOSPITAL | Age: 68
End: 2023-08-24
Payer: MEDICARE

## 2023-08-24 DIAGNOSIS — Z72.0 TOBACCO USE: ICD-10-CM

## 2023-08-24 DIAGNOSIS — I10 HTN, GOAL BELOW 140/90: ICD-10-CM

## 2023-08-24 DIAGNOSIS — Z13.6 SCREENING FOR CARDIOVASCULAR CONDITION: ICD-10-CM

## 2023-08-24 LAB
ANION GAP SERPL CALCULATED.3IONS-SCNC: 7 MMOL/L
BASOPHILS # BLD AUTO: 0.06 THOUSANDS/ÂΜL (ref 0–0.1)
BASOPHILS NFR BLD AUTO: 1 % (ref 0–1)
BUN SERPL-MCNC: 9 MG/DL (ref 5–25)
CALCIUM SERPL-MCNC: 9.2 MG/DL (ref 8.4–10.2)
CHLORIDE SERPL-SCNC: 103 MMOL/L (ref 96–108)
CHOLEST SERPL-MCNC: 232 MG/DL
CO2 SERPL-SCNC: 28 MMOL/L (ref 21–32)
CREAT SERPL-MCNC: 0.53 MG/DL (ref 0.6–1.3)
EOSINOPHIL # BLD AUTO: 0.16 THOUSAND/ÂΜL (ref 0–0.61)
EOSINOPHIL NFR BLD AUTO: 2 % (ref 0–6)
ERYTHROCYTE [DISTWIDTH] IN BLOOD BY AUTOMATED COUNT: 13.2 % (ref 11.6–15.1)
GFR SERPL CREATININE-BSD FRML MDRD: 98 ML/MIN/1.73SQ M
GLUCOSE P FAST SERPL-MCNC: 92 MG/DL (ref 65–99)
HCT VFR BLD AUTO: 45.9 % (ref 34.8–46.1)
HDLC SERPL-MCNC: 95 MG/DL
HGB BLD-MCNC: 15 G/DL (ref 11.5–15.4)
IMM GRANULOCYTES # BLD AUTO: 0.05 THOUSAND/UL (ref 0–0.2)
IMM GRANULOCYTES NFR BLD AUTO: 1 % (ref 0–2)
LDLC SERPL CALC-MCNC: 118 MG/DL (ref 0–100)
LYMPHOCYTES # BLD AUTO: 1.73 THOUSANDS/ÂΜL (ref 0.6–4.47)
LYMPHOCYTES NFR BLD AUTO: 16 % (ref 14–44)
MCH RBC QN AUTO: 32.7 PG (ref 26.8–34.3)
MCHC RBC AUTO-ENTMCNC: 32.7 G/DL (ref 31.4–37.4)
MCV RBC AUTO: 100 FL (ref 82–98)
MONOCYTES # BLD AUTO: 0.72 THOUSAND/ÂΜL (ref 0.17–1.22)
MONOCYTES NFR BLD AUTO: 7 % (ref 4–12)
NEUTROPHILS # BLD AUTO: 8.14 THOUSANDS/ÂΜL (ref 1.85–7.62)
NEUTS SEG NFR BLD AUTO: 73 % (ref 43–75)
NRBC BLD AUTO-RTO: 0 /100 WBCS
PLATELET # BLD AUTO: 248 THOUSANDS/UL (ref 149–390)
PMV BLD AUTO: 9.6 FL (ref 8.9–12.7)
POTASSIUM SERPL-SCNC: 4.6 MMOL/L (ref 3.5–5.3)
RBC # BLD AUTO: 4.59 MILLION/UL (ref 3.81–5.12)
SODIUM SERPL-SCNC: 138 MMOL/L (ref 135–147)
TRIGL SERPL-MCNC: 93 MG/DL
TSH SERPL DL<=0.05 MIU/L-ACNC: 1.75 UIU/ML (ref 0.45–4.5)
WBC # BLD AUTO: 10.86 THOUSAND/UL (ref 4.31–10.16)

## 2023-08-24 PROCEDURE — 36415 COLL VENOUS BLD VENIPUNCTURE: CPT

## 2023-08-24 PROCEDURE — 84443 ASSAY THYROID STIM HORMONE: CPT

## 2023-08-24 PROCEDURE — 80048 BASIC METABOLIC PNL TOTAL CA: CPT

## 2023-08-24 PROCEDURE — 80061 LIPID PANEL: CPT

## 2023-08-24 PROCEDURE — 85025 COMPLETE CBC W/AUTO DIFF WBC: CPT

## 2024-01-20 ENCOUNTER — OFFICE VISIT (OUTPATIENT)
Dept: URGENT CARE | Facility: CLINIC | Age: 69
End: 2024-01-20
Payer: MEDICARE

## 2024-01-20 VITALS
SYSTOLIC BLOOD PRESSURE: 122 MMHG | BODY MASS INDEX: 17.27 KG/M2 | OXYGEN SATURATION: 99 % | TEMPERATURE: 96.4 F | HEIGHT: 67 IN | DIASTOLIC BLOOD PRESSURE: 66 MMHG | RESPIRATION RATE: 17 BRPM | HEART RATE: 60 BPM | WEIGHT: 110 LBS

## 2024-01-20 DIAGNOSIS — U07.1 COVID-19: Primary | ICD-10-CM

## 2024-01-20 DIAGNOSIS — R05.1 ACUTE COUGH: ICD-10-CM

## 2024-01-20 LAB
SARS-COV-2 AG UPPER RESP QL IA: POSITIVE
VALID CONTROL: ABNORMAL

## 2024-01-20 PROCEDURE — 87811 SARS-COV-2 COVID19 W/OPTIC: CPT

## 2024-01-20 PROCEDURE — 99213 OFFICE O/P EST LOW 20 MIN: CPT

## 2024-01-20 PROCEDURE — G0463 HOSPITAL OUTPT CLINIC VISIT: HCPCS

## 2024-01-20 NOTE — LETTER
January 20, 2024     Patient: Corie Pal   YOB: 1955   Date of Visit: 1/20/2024       To Whom It May Concern:    It is my medical opinion that Corie Pal may return to work on 1/24/2024 .    If you have any questions or concerns, please don't hesitate to call.         Sincerely,        Kristian Perez PA-C    CC: No Recipients

## 2024-01-20 NOTE — PROGRESS NOTES
[facility]        NAME: Corie Pal is a 68 y.o. female  : 1955    MRN: [id number]  DATE: 2024  TIME: 12:08 PM    Assessment and Plan   COVID-19 [U07.1]  1. COVID-19        2. Acute cough  Poct Covid 19 Rapid Antigen Test        Rapid covid: pos    Patient Instructions     OTC medications for symptom management as needed  Plenty of fluids  Can use honey   Cool mist humidifier   Warm gargle with salt water for sore throat   Use Tylenol/ibuprofen as needed for fever or pain    Follow up with PCP in 3-5 days.  Proceed to  ER if symptoms worsen.    Chief Complaint     Chief Complaint   Patient presents with    Cold Like Symptoms     C/o bilateral earache and fullness, dry heaves, diarrhea,headache, cough, and sinus drainage. Onset x2 weeks ago for earache and fullness all other symptoms started x2 days ago.         History of Present Illness       URI   This is a new problem. Episode onset: ear pain was 2 weeks ago but other sx 2 days. Associated symptoms include coughing, diarrhea, ear pain (bilaterally), headaches and vomiting (dry heaves). Pertinent negatives include no chest pain, congestion, rhinorrhea, sore throat or wheezing.       Review of Systems   Review of Systems   Constitutional:  Positive for chills, diaphoresis and fever. Negative for fatigue.   HENT:  Positive for ear pain (bilaterally) and sinus pressure. Negative for congestion, postnasal drip, rhinorrhea, sore throat and trouble swallowing.    Respiratory:  Positive for cough. Negative for chest tightness, shortness of breath and wheezing.    Cardiovascular:  Negative for chest pain and palpitations.   Gastrointestinal:  Positive for diarrhea and vomiting (dry heaves).   Skin:  Negative for color change.   Neurological:  Positive for headaches. Negative for dizziness and light-headedness.   Psychiatric/Behavioral:  Negative for sleep disturbance.          Current Medications       Current Outpatient Medications:      amLODIPine (NORVASC) 10 mg tablet, take 1 tablet by mouth once daily, Disp: 90 tablet, Rfl: 5    brimonidine-timolol (COMBIGAN) 0.2-0.5 %, Apply to eye, Disp: , Rfl:     EPINEPHrine (EPIPEN) 0.3 mg/0.3 mL SOAJ, Inject as directed, Disp: , Rfl:     prednisoLONE acetate (PRED MILD) 0.12 % ophthalmic suspension, 1 drop 4 (four) times a day, Disp: , Rfl:     Current Allergies     Allergies as of 01/20/2024 - Reviewed 01/20/2024   Allergen Reaction Noted    Bee venom Anaphylaxis 04/16/2019    Dayquil [pseudoephedrine-dm-gg-apap] Other (See Comments) 05/10/2023            The following portions of the patient's history were reviewed and updated as appropriate: allergies, current medications, past family history, past medical history, past social history, past surgical history and problem list.     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    iris nevus tumor removed       Past Surgical History:   Procedure Laterality Date    BREAST EXCISIONAL BIOPSY Left 1975    age 19; benign lesion    BREAST EXCISIONAL BIOPSY Left 01/04/2000    atypical hyperplasia    BREAST EXCISIONAL BIOPSY Left 01/30/2002    atypical intraductal hyperplasia    BREAST EXCISIONAL BIOPSY Left 04/17/2002    intraductal epithelial hyperplasia and papillomatosis with focal atypia    BREAST EXCISIONAL BIOPSY Left 02/25/2003    foci of atypical intraductal hyperplasia    BREAST EXCISIONAL BIOPSY Left 06/05/2006    fibrocystic change/fibrous mastopathy    BREAST EXCISIONAL BIOPSY Left 02/25/2008    fibrocystic changes    DILATION AND CURETTAGE OF UTERUS      EYE SURGERY Left 12/21/2006    HYSTERECTOMY  02/2000    OOPHORECTOMY Bilateral 2013    approximately    REFRACTIVE SURGERY      US GUIDANCE BREAST BIOPSY LEFT EACH ADDITIONAL Left 04/28/2008    benign breast tissue with stromal fibrosis  "and sclerosing adenosis    US GUIDED BREAST BIOPSY LEFT COMPLETE Left 04/28/2008    benign breast tissue with stromal fibrosis and sclerosing adenosis       Family History   Problem Relation Age of Onset    Alzheimer's disease Mother     No Known Problems Father     Breast cancer Sister 42    No Known Problems Sister     No Known Problems Sister     No Known Problems Daughter     Breast cancer Maternal Grandmother 38    No Known Problems Maternal Grandfather     No Known Problems Paternal Grandmother     No Known Problems Paternal Grandfather     No Known Problems Maternal Aunt     No Known Problems Maternal Aunt     Breast cancer Paternal Aunt         82    Breast cancer Cousin 44    Breast cancer Cousin 45    Breast cancer additional onset Neg Hx     BRCA2 Positive Neg Hx     BRCA2 Negative Neg Hx     BRCA1 Positive Neg Hx     BRCA1 Negative Neg Hx     BRCA 1/2 Neg Hx     Colon cancer Neg Hx     Ovarian cancer Neg Hx     Endometrial cancer Neg Hx          Medications have been verified.        Objective   /66   Pulse 60   Temp (!) 96.4 °F (35.8 °C)   Resp 17   Ht 5' 7\" (1.702 m)   Wt 49.9 kg (110 lb)   SpO2 99%   BMI 17.23 kg/m²        Physical Exam     Physical Exam  Constitutional:       General: She is not in acute distress.     Appearance: Normal appearance. She is not ill-appearing.   HENT:      Head: Normocephalic.      Right Ear: Tympanic membrane and external ear normal.      Left Ear: Tympanic membrane and external ear normal.      Nose: No congestion.      Mouth/Throat:      Mouth: Mucous membranes are moist.      Pharynx: Oropharynx is clear.   Cardiovascular:      Rate and Rhythm: Normal rate and regular rhythm.      Pulses: Normal pulses.      Heart sounds: Normal heart sounds.   Pulmonary:      Effort: Pulmonary effort is normal. No respiratory distress.      Breath sounds: Normal breath sounds. No stridor. No wheezing, rhonchi or rales.   Lymphadenopathy:      Cervical: No cervical " adenopathy.   Skin:     General: Skin is warm and dry.   Neurological:      General: No focal deficit present.      Mental Status: She is alert and oriented to person, place, and time. Mental status is at baseline.   Psychiatric:         Mood and Affect: Mood normal.         Behavior: Behavior normal.         Thought Content: Thought content normal.         Judgment: Judgment normal.

## 2024-01-20 NOTE — PATIENT INSTRUCTIONS
OTC medications for symptom management as needed  Plenty of fluids  Can use honey   Cool mist humidifier   Warm gargle with salt water for sore throat   Use Tylenol/ibuprofen as needed for fever or pain    Follow up with PCP in 3-5 days.  Proceed to  ER if symptoms worsen.

## 2024-11-02 ENCOUNTER — OFFICE VISIT (OUTPATIENT)
Dept: URGENT CARE | Facility: CLINIC | Age: 69
End: 2024-11-02
Payer: MEDICARE

## 2024-11-02 VITALS
SYSTOLIC BLOOD PRESSURE: 144 MMHG | WEIGHT: 109 LBS | HEART RATE: 98 BPM | HEIGHT: 67 IN | TEMPERATURE: 96.6 F | BODY MASS INDEX: 17.11 KG/M2 | DIASTOLIC BLOOD PRESSURE: 78 MMHG | OXYGEN SATURATION: 96 % | RESPIRATION RATE: 17 BRPM

## 2024-11-02 DIAGNOSIS — J01.00 ACUTE NON-RECURRENT MAXILLARY SINUSITIS: Primary | ICD-10-CM

## 2024-11-02 DIAGNOSIS — K08.89 PAIN, DENTAL: ICD-10-CM

## 2024-11-02 PROCEDURE — G0463 HOSPITAL OUTPT CLINIC VISIT: HCPCS

## 2024-11-02 PROCEDURE — 99213 OFFICE O/P EST LOW 20 MIN: CPT

## 2024-11-02 RX ORDER — CARVEDILOL 6.25 MG/1
6.25 TABLET ORAL 2 TIMES DAILY
COMMUNITY
Start: 2024-10-03

## 2024-11-02 NOTE — PROGRESS NOTES
[facility]        NAME: Corie Pal is a 69 y.o. female  : 1955    MRN: [id number]  DATE: 2024  TIME: 11:37 AM    Assessment and Plan   Acute non-recurrent maxillary sinusitis [J01.00]  1. Acute non-recurrent maxillary sinusitis  amoxicillin-clavulanate (AUGMENTIN) 875-125 mg per tablet      2. Pain, dental  amoxicillin-clavulanate (AUGMENTIN) 875-125 mg per tablet            Patient Instructions       Follow up with PCP in 3-5 days.  Proceed to  ER if symptoms worsen.    If tests are performed, our office will contact you with results only if changes need to made to the care plan discussed with you at the visit. [facility]    Chief Complaint     Chief Complaint   Patient presents with    Dental Pain     C/o lower right tooth pain, she states she is now having pain into her right ear and pressure in her sinuses. Onset x7 days ago.          History of Present Illness       C/o lower right tooth pain, she states she is now having pain into her right ear and pressure in her sinuses. Onset x7 days ago.struggles w/ allergies. 2 weeks of sinus symptoms. Last week tooth pain.     Dental Pain   Pertinent negatives include no fever or sinus pressure.       Review of Systems   Review of Systems   Constitutional:  Negative for appetite change, chills, fatigue and fever.   HENT:  Positive for congestion, ear pain, postnasal drip and rhinorrhea. Negative for sinus pressure, sinus pain and sore throat.    Respiratory:  Positive for cough. Negative for shortness of breath, wheezing and stridor.    Cardiovascular:  Negative for chest pain and palpitations.   Gastrointestinal:  Negative for abdominal pain, constipation, diarrhea, nausea and vomiting.   Musculoskeletal:  Negative for myalgias.   Neurological:  Negative for dizziness, syncope, light-headedness and headaches.         Current Medications       Current Outpatient Medications:     amLODIPine  (NORVASC) 10 mg tablet, take 1 tablet by mouth once daily, Disp: 90 tablet, Rfl: 5    amoxicillin-clavulanate (AUGMENTIN) 875-125 mg per tablet, Take 1 tablet by mouth every 12 (twelve) hours for 7 days, Disp: 14 tablet, Rfl: 0    brimonidine-timolol (COMBIGAN) 0.2-0.5 %, Apply to eye, Disp: , Rfl:     carvedilol (COREG) 6.25 mg tablet, Take 6.25 mg by mouth 2 (two) times a day, Disp: , Rfl:     EPINEPHrine (EPIPEN) 0.3 mg/0.3 mL SOAJ, Inject as directed, Disp: , Rfl:     prednisoLONE acetate (PRED MILD) 0.12 % ophthalmic suspension, 1 drop 4 (four) times a day, Disp: , Rfl:     Current Allergies     Allergies as of 11/02/2024 - Reviewed 11/02/2024   Allergen Reaction Noted    Bee venom Anaphylaxis 04/16/2019    Dayquil [[id number]] Other (See Comments) 05/10/2023            The following portions of the patient's history were reviewed and updated as appropriate: allergies, current medications, past family history, past medical history, past social history, past surgical history and problem list.     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    iris nevus tumor removed       Past Surgical History:   Procedure Laterality Date    BREAST EXCISIONAL BIOPSY Left 1975    age 19; benign lesion    BREAST EXCISIONAL BIOPSY Left 01/04/2000    atypical hyperplasia    BREAST EXCISIONAL BIOPSY Left 01/30/2002    atypical intraductal hyperplasia    BREAST EXCISIONAL BIOPSY Left 04/17/2002    intraductal epithelial hyperplasia and papillomatosis with focal atypia    BREAST EXCISIONAL BIOPSY Left 02/25/2003    foci of atypical intraductal hyperplasia    BREAST EXCISIONAL BIOPSY Left 06/05/2006    fibrocystic change/fibrous mastopathy    BREAST EXCISIONAL BIOPSY Left 02/25/2008    fibrocystic changes    DILATION AND CURETTAGE OF UTERUS      EYE  "SURGERY Left 12/21/2006    HYSTERECTOMY  02/2000    OOPHORECTOMY Bilateral 2013    approximately    REFRACTIVE SURGERY      US GUIDANCE BREAST BIOPSY LEFT EACH ADDITIONAL Left 04/28/2008    benign breast tissue with stromal fibrosis and sclerosing adenosis    US GUIDED BREAST BIOPSY LEFT COMPLETE Left 04/28/2008    benign breast tissue with stromal fibrosis and sclerosing adenosis       Family History   Problem Relation Age of Onset    Alzheimer's disease Mother     No Known Problems Father     Breast cancer Sister 42    No Known Problems Sister     No Known Problems Sister     No Known Problems Daughter     Breast cancer Maternal Grandmother 38    No Known Problems Maternal Grandfather     No Known Problems Paternal Grandmother     No Known Problems Paternal Grandfather     No Known Problems Maternal Aunt     No Known Problems Maternal Aunt     Breast cancer Paternal Aunt         82    Breast cancer Cousin 44    Breast cancer Cousin 45    Breast cancer additional onset Neg Hx     BRCA2 Positive Neg Hx     BRCA2 Negative Neg Hx     BRCA1 Positive Neg Hx     BRCA1 Negative Neg Hx     BRCA 1/2 Neg Hx     Colon cancer Neg Hx     Ovarian cancer Neg Hx     Endometrial cancer Neg Hx          Medications have been verified.        Objective   /78   Pulse 98   Temp (!) 96.6 °F (35.9 °C)   Resp 17   Ht 5' 7\" (1.702 m)   Wt 49.4 kg (109 lb)   SpO2 96%   BMI 17.07 kg/m²        Physical Exam     Physical Exam  Vitals and nursing note reviewed.   Constitutional:       General: She is not in acute distress.     Appearance: Normal appearance. She is normal weight. She is not ill-appearing, toxic-appearing or diaphoretic.   HENT:      Head: Normocephalic.      Right Ear: Tympanic membrane, ear canal and external ear normal. There is no impacted cerumen.      Left Ear: Tympanic membrane, ear canal and external ear normal. There is no impacted cerumen.      Nose: Congestion and rhinorrhea present.      Mouth/Throat:      " Mouth: Mucous membranes are moist.      Dentition: Abnormal dentition. Does not have dentures. Dental tenderness present. No gingival swelling, dental caries, dental abscesses or gum lesions.      Pharynx: Oropharynx is clear. Uvula midline. Posterior oropharyngeal erythema present. No pharyngeal swelling, oropharyngeal exudate, uvula swelling or postnasal drip.   Eyes:      General: No scleral icterus.        Right eye: No discharge.         Left eye: No discharge.      Extraocular Movements: Extraocular movements intact.      Conjunctiva/sclera: Conjunctivae normal.      Pupils: Pupils are equal, round, and reactive to light.   Neck:      Vascular: No carotid bruit.   Cardiovascular:      Rate and Rhythm: Normal rate and regular rhythm.      Pulses: Normal pulses.      Heart sounds: Normal heart sounds. No murmur heard.     No friction rub. No gallop.   Pulmonary:      Effort: Pulmonary effort is normal. No respiratory distress.      Breath sounds: Normal breath sounds. No stridor. No wheezing, rhonchi or rales.   Chest:      Chest wall: No tenderness.   Musculoskeletal:      Cervical back: Normal range of motion and neck supple. No rigidity or tenderness.   Lymphadenopathy:      Cervical: No cervical adenopathy.   Neurological:      Mental Status: She is alert.

## 2025-01-04 ENCOUNTER — APPOINTMENT (OUTPATIENT)
Dept: RADIOLOGY | Facility: CLINIC | Age: 70
End: 2025-01-04
Payer: MEDICARE

## 2025-01-04 ENCOUNTER — OFFICE VISIT (OUTPATIENT)
Dept: URGENT CARE | Facility: CLINIC | Age: 70
End: 2025-01-04
Payer: MEDICARE

## 2025-01-04 VITALS
SYSTOLIC BLOOD PRESSURE: 138 MMHG | BODY MASS INDEX: 16.95 KG/M2 | WEIGHT: 108 LBS | TEMPERATURE: 96.3 F | DIASTOLIC BLOOD PRESSURE: 76 MMHG | HEART RATE: 82 BPM | RESPIRATION RATE: 18 BRPM | HEIGHT: 67 IN | OXYGEN SATURATION: 94 %

## 2025-01-04 DIAGNOSIS — J18.9 PNEUMONIA OF RIGHT LUNG DUE TO INFECTIOUS ORGANISM, UNSPECIFIED PART OF LUNG: Primary | ICD-10-CM

## 2025-01-04 DIAGNOSIS — R05.1 ACUTE COUGH: ICD-10-CM

## 2025-01-04 PROCEDURE — 99213 OFFICE O/P EST LOW 20 MIN: CPT

## 2025-01-04 PROCEDURE — 71046 X-RAY EXAM CHEST 2 VIEWS: CPT

## 2025-01-04 PROCEDURE — G0463 HOSPITAL OUTPT CLINIC VISIT: HCPCS

## 2025-01-04 RX ORDER — PREDNISONE 10 MG/1
40 TABLET ORAL DAILY
Qty: 20 TABLET | Refills: 0 | Status: SHIPPED | OUTPATIENT
Start: 2025-01-04 | End: 2025-01-09

## 2025-01-04 NOTE — PROGRESS NOTES
Name: Corie Pal      : 1955      MRN: [id number]  Encounter Provider: KRISTI Sin  Encounter Date: 2025   Encounter department: [facility]  :  Assessment & Plan  Acute cough    Cough x 2 weeks.  Completed Augmentin 24.  Completed Amoxicillin after this for 7 days.    Denies fevers or chills.  No CP or SOB.  +vaping nicotine.  Prelim reading of CXR clear. Await final read.  Prednisone burst as directed. F/u with PCP.  ER precautions discussed.      Orders:    XR chest pa and lateral; Future    predniSONE 10 mg tablet; Take 4 tablets (40 mg total) by mouth daily for 5 days        History of Present Illness     Corie Pal is a 69 y.o. female who presents with cough x 2 weeks.    No OTC meds.    History obtained from: patient    Review of Systems   Constitutional:  Negative for chills, fatigue and fever.   HENT:  Negative for congestion, ear pain, facial swelling, hearing loss, rhinorrhea, sinus pressure, sneezing, sore throat and trouble swallowing.    Eyes:  Negative for pain, redness and visual disturbance.   Respiratory:  Positive for cough. Negative for chest tightness, shortness of breath and wheezing.    Cardiovascular:  Negative for chest pain and palpitations.   Gastrointestinal:  Negative for abdominal pain, diarrhea, nausea and vomiting.   Genitourinary:  Negative for dysuria, flank pain, hematuria and pelvic pain.   Musculoskeletal:  Negative for arthralgias, back pain and myalgias.   Skin:  Negative for color change and rash.   Neurological:  Negative for dizziness, seizures, syncope, weakness, light-headedness and headaches.   Psychiatric/Behavioral:  Negative for confusion, hallucinations and sleep disturbance. The patient is not nervous/anxious.    All other systems reviewed and are negative.    Medical History Reviewed by provider this encounter:  Tobacco  Allergies  Meds  Problems  Med Hx  Surg Hx  Fam Hx      ".  Current Outpatient Medications on File Prior to Visit   Medication Sig Dispense Refill    amLODIPine (NORVASC) 10 mg tablet take 1 tablet by mouth once daily 90 tablet 5    brimonidine-timolol (COMBIGAN) 0.2-0.5 % Apply to eye      carvedilol (COREG) 6.25 mg tablet Take 6.25 mg by mouth 2 (two) times a day      prednisoLONE acetate (PRED MILD) 0.12 % ophthalmic suspension 1 drop 4 (four) times a day      EPINEPHrine (EPIPEN) 0.3 mg/0.3 mL SOAJ Inject as directed (Patient not taking: Reported on 1/4/2025)       No current facility-administered medications on file prior to visit.      Social History     Tobacco Use    Smoking status: Every Day     Current packs/day: 0.25     Average packs/day: 0.3 packs/day for 25.0 years (6.3 ttl pk-yrs)     Types: Cigarettes    Smokeless tobacco: Never   Vaping Use    Vaping status: Every Day   Substance and Sexual Activity    Alcohol use: Yes    Drug use: Not on file    Sexual activity: Not on file        Objective   /76   Pulse 82   Temp (!) 96.3 °F (35.7 °C)   Resp 18   Ht 5' 7\" (1.702 m)   Wt 49 kg (108 lb)   SpO2 94%   BMI 16.92 kg/m²      Physical Exam  Vitals and nursing note reviewed.   Constitutional:       General: She is not in acute distress.     Appearance: She is well-developed.   HENT:      Head: Normocephalic and atraumatic.      Right Ear: Hearing and tympanic membrane normal.      Left Ear: Hearing and tympanic membrane normal.      Nose: Nose normal.      Mouth/Throat:      Mouth: Mucous membranes are moist.      Dentition: Normal dentition.      Tongue: No lesions.      Pharynx: Oropharynx is clear. Uvula midline. No oropharyngeal exudate.      Tonsils: No tonsillar exudate.   Eyes:      Extraocular Movements: Extraocular movements intact.      Conjunctiva/sclera: Conjunctivae normal.   Neck:      Vascular: No carotid bruit or JVD.   Cardiovascular:      Rate and Rhythm: Normal rate and regular rhythm.      Heart sounds: S1 normal and S2 normal. " No murmur heard.  Pulmonary:      Effort: Pulmonary effort is normal. No tachypnea or respiratory distress.      Breath sounds: Normal breath sounds and air entry. No decreased breath sounds.      Comments: CTAB  Chest:      Chest wall: No deformity or tenderness.   Abdominal:      General: Abdomen is flat. Bowel sounds are normal. There is no distension.      Palpations: Abdomen is soft.      Tenderness: There is no abdominal tenderness. There is no right CVA tenderness, left CVA tenderness or guarding.   Musculoskeletal:         General: No swelling.      Cervical back: Full passive range of motion without pain and neck supple.      Right lower leg: No edema.      Left lower leg: No edema.   Lymphadenopathy:      Cervical: Cervical adenopathy present.      Right cervical: Superficial cervical adenopathy present.   Skin:     General: Skin is warm and dry.      Capillary Refill: Capillary refill takes less than 2 seconds.      Findings: No rash.   Neurological:      Mental Status: She is alert and oriented to person, place, and time.      Cranial Nerves: Cranial nerves 2-12 are intact.      Sensory: Sensation is intact.      Motor: Motor function is intact.      Coordination: Coordination is intact.      Gait: Gait is intact.   Psychiatric:         Mood and Affect: Mood normal.         Behavior: Behavior is cooperative.

## 2025-01-05 ENCOUNTER — RESULTS FOLLOW-UP (OUTPATIENT)
Dept: URGENT CARE | Facility: CLINIC | Age: 70
End: 2025-01-05

## 2025-01-06 RX ORDER — DOXYCYCLINE 100 MG/1
100 TABLET ORAL 2 TIMES DAILY
Qty: 14 TABLET | Refills: 0 | Status: SHIPPED | OUTPATIENT
Start: 2025-01-06 | End: 2025-01-13

## 2025-01-09 ENCOUNTER — TELEPHONE (OUTPATIENT)
Age: 70
End: 2025-01-09

## 2025-01-09 NOTE — TELEPHONE ENCOUNTER
Called patient to advise that NP appointment needed to be rescheduled. After reviewing chart, I realized that she is not a NP as she was seen last for AMV on 6/26/23 by Gabriela Menendez. Schedule as Urgent Care F/U for 1/14/25.

## 2025-01-13 ENCOUNTER — TELEPHONE (OUTPATIENT)
Dept: FAMILY MEDICINE CLINIC | Facility: CLINIC | Age: 70
End: 2025-01-13

## 2025-01-13 NOTE — TELEPHONE ENCOUNTER
Attempted to reach Corie to reschedule her appointment with Dr. Caputo due to her being out of office that day. Patient didn't answer/ left a voicemail.

## 2025-01-13 NOTE — TELEPHONE ENCOUNTER
Pt returned call got reschedule her in for wed. But patient mentioned having sleepless nights and out of work for 2 days now. Would like to be seen by other provider if possible tmr. Kindly see if any accomodation can be done do call her back. Thanks

## 2025-01-14 ENCOUNTER — OFFICE VISIT (OUTPATIENT)
Dept: FAMILY MEDICINE CLINIC | Facility: CLINIC | Age: 70
End: 2025-01-14
Payer: MEDICARE

## 2025-01-14 VITALS
HEIGHT: 67 IN | DIASTOLIC BLOOD PRESSURE: 63 MMHG | HEART RATE: 95 BPM | TEMPERATURE: 97.6 F | OXYGEN SATURATION: 99 % | SYSTOLIC BLOOD PRESSURE: 130 MMHG | BODY MASS INDEX: 16.4 KG/M2 | WEIGHT: 104.5 LBS

## 2025-01-14 DIAGNOSIS — I10 ESSENTIAL HYPERTENSION: ICD-10-CM

## 2025-01-14 DIAGNOSIS — J18.9 PNEUMONIA OF RIGHT LUNG DUE TO INFECTIOUS ORGANISM, UNSPECIFIED PART OF LUNG: ICD-10-CM

## 2025-01-14 DIAGNOSIS — J98.01 BRONCHOSPASM, ACUTE: ICD-10-CM

## 2025-01-14 DIAGNOSIS — J98.4 PNEUMONITIS: Primary | ICD-10-CM

## 2025-01-14 DIAGNOSIS — E78.2 MIXED HYPERLIPIDEMIA: ICD-10-CM

## 2025-01-14 DIAGNOSIS — Z72.0 VAPES NICOTINE CONTAINING SUBSTANCE: ICD-10-CM

## 2025-01-14 PROCEDURE — 99214 OFFICE O/P EST MOD 30 MIN: CPT | Performed by: PHYSICIAN ASSISTANT

## 2025-01-14 RX ORDER — ALBUTEROL SULFATE 90 UG/1
2 INHALANT RESPIRATORY (INHALATION) EVERY 6 HOURS PRN
Qty: 6.7 G | Refills: 5 | Status: SHIPPED | OUTPATIENT
Start: 2025-01-14

## 2025-01-14 RX ORDER — LEVOFLOXACIN 500 MG/1
500 TABLET, FILM COATED ORAL EVERY 24 HOURS
Qty: 7 TABLET | Refills: 0 | Status: SHIPPED | OUTPATIENT
Start: 2025-01-14 | End: 2025-01-21

## 2025-01-14 NOTE — PROGRESS NOTES
Name: Corie Pal      : 1955      MRN: [id number]  Encounter Provider: Gabriela Menendez PA-C  Encounter Date: 2025   Encounter department: [facility]  :  Assessment & Plan  Pneumonitis  Patient has been battling upper respiratory tract symptoms since November.  She was seen in urgent care on  and diagnosed with pneumonia.  She was given doxycycline along with steroids.  Her symptoms came back on 2025 and more worse than her original symptoms.  She is having difficulty breathing.  With her symptoms worsening, we will hold her off work until 2025.  She will contact me if her condition continues to worsen and not improved.  Orders:  •  levofloxacin (LEVAQUIN) 500 mg tablet; Take 1 tablet (500 mg total) by mouth every 24 hours for 7 days  •  albuterol (Proventil HFA) 90 mcg/act inhaler; Inhale 2 puffs every 6 (six) hours as needed for wheezing    Essential hypertension  Blood pressure presently well-controlled with the use of amlodipine and Coreg.       Vapes nicotine containing substance  Patient continues to vape but is here for sick visit will address this at her Medicare annual wellness.       Mixed hyperlipidemia  Patient's 10-year ASCVD risk is at 17.8%.  We will talk about the role of lipid-lowering therapy at her Medicare annual wellness visit.       Bronchospasm, acute  Patient's air exchange is quite labored.  She has prolonged expiration and tight wheezing with bronchospasm.  Will add inhaler to the patient's treatment.  Orders:  •  albuterol (Proventil HFA) 90 mcg/act inhaler; Inhale 2 puffs every 6 (six) hours as needed for wheezing           History of Present Illness     HPI: 69-year-old female seen for an acute care visit.  She states that she has had symptoms of pneumonia with coughing sputum production and unable to sleep due to prolonged coughing since November.  She had exacerbation of her symptoms on  was given a  "course of doxycycline.  During this time.  She also had dental surgery and had amoxicillin x 2 given.  She felt as if she was getting better until 1 1/9/2025 when her symptoms became much worse.  We talked about this being a classic presentation for secondary bacterial infection.    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  She said that one of the coworkers said she had walking pneumonia but still came to work.  This might be the source of the patient's secondary infection.    Patient was encouraged to have chicken soup and sleep upright.  She is to get rest keep hydrated and take the antibiotics with the probiotic or yogurt.  Review of Systems: Acute visit totally related to respiratory system.    Objective   /63 (BP Location: Right arm, Patient Position: Sitting, Cuff Size: Standard)   Pulse 95   Temp 97.6 °F (36.4 °C) (Oral) Comment: tylenol aprox 2 hrs ago  Ht 5' 7\" (1.702 m)   Wt 47.4 kg (104 lb 8 oz)   SpO2 99%   BMI 16.37 kg/m²      Physical Exam: 69-year-old female she is normotensive.  She is able to speak in full sentences.  She appears ill and exhausted.    Heart is regular rate tones are distant.  Wheezing is heard throughout the lung sounds both anterior and posteriorly.  She has prolonged expiration with end expiratory wheezes throughout.  Not using accessory muscles of respiration.  Administrative Statements   I have spent a total time of 30 minutes in caring for this patient on the day of the visit/encounter including Prognosis, Risks and benefits of tx options, Instructions for management, Importance of tx compliance, Risk factor reductions, Counseling / Coordination of care, Documenting in the medical record, Reviewing / ordering tests, medicine, procedures  , and Obtaining or reviewing history  .  Is an acute care visit, we talked about the best way to get her better.  I will see her in 2 weeks to do her Medicare annual wellness exam.  She is to take " the Levaquin for the 7-day course and use the albuterol every 2-4 hours as needed for wheezing.

## 2025-01-14 NOTE — LETTER
January 14, 2025     Patient: Corie Pal  YOB: 1955  Date of Visit: 1/14/2025      To Whom it May Concern:    Corie Pal is under my professional care. Corie was seen in my office on 1/14/2025. Corie may return to work on January 20, 2025 .    If you have any questions or concerns, please don't hesitate to call.         Sincerely,          Gabriela Menendez PA-C        CC: No Recipients

## 2025-01-20 ENCOUNTER — TELEPHONE (OUTPATIENT)
Age: 70
End: 2025-01-20

## 2025-01-20 NOTE — TELEPHONE ENCOUNTER
Please let the patient know that I extended her time off until January 22.    Letter has been written and is been printed.    [place]

## 2025-01-20 NOTE — TELEPHONE ENCOUNTER
Pt was seen on 1/14 and stated she is just starting to feel better and was not able to go into work today. Is asking if work note can be extended for a few days. She stated she still feels somewhat weak and was not sure exactly what day she would return back to work. Any questions pt can be reached back at [phone]. Thank you.

## 2025-01-27 ENCOUNTER — TELEPHONE (OUTPATIENT)
Dept: FAMILY MEDICINE CLINIC | Facility: CLINIC | Age: 70
End: 2025-01-27

## 2025-01-27 NOTE — TELEPHONE ENCOUNTER
Attempted to reach Corie to let her know that we changed her appointment type to a Medicare Wellness visit and also to see if the patient can arrive at 12:40pm. Patient didn't answer/ left a voicemail.

## 2025-01-28 ENCOUNTER — OFFICE VISIT (OUTPATIENT)
Dept: FAMILY MEDICINE CLINIC | Facility: CLINIC | Age: 70
End: 2025-01-28
Payer: MEDICARE

## 2025-01-28 VITALS
SYSTOLIC BLOOD PRESSURE: 128 MMHG | HEIGHT: 67 IN | TEMPERATURE: 96.4 F | DIASTOLIC BLOOD PRESSURE: 82 MMHG | HEART RATE: 84 BPM | WEIGHT: 105.16 LBS | BODY MASS INDEX: 16.51 KG/M2 | OXYGEN SATURATION: 98 %

## 2025-01-28 DIAGNOSIS — Z53.20 COLON CANCER SCREENING DECLINED: ICD-10-CM

## 2025-01-28 DIAGNOSIS — F43.21 GRIEVING: ICD-10-CM

## 2025-01-28 DIAGNOSIS — R05.2 SUBACUTE COUGH: ICD-10-CM

## 2025-01-28 DIAGNOSIS — M81.0 AGE-RELATED OSTEOPOROSIS WITHOUT CURRENT PATHOLOGICAL FRACTURE: ICD-10-CM

## 2025-01-28 DIAGNOSIS — I10 ESSENTIAL HYPERTENSION: ICD-10-CM

## 2025-01-28 DIAGNOSIS — Z00.00 MEDICARE ANNUAL WELLNESS VISIT, SUBSEQUENT: Primary | ICD-10-CM

## 2025-01-28 DIAGNOSIS — Z12.31 ENCOUNTER FOR SCREENING MAMMOGRAM FOR BREAST CANCER: ICD-10-CM

## 2025-01-28 DIAGNOSIS — Z72.0 TOBACCO USE: ICD-10-CM

## 2025-01-28 DIAGNOSIS — F17.290 VAPING NICOTINE DEPENDENCE, TOBACCO PRODUCT: ICD-10-CM

## 2025-01-28 DIAGNOSIS — R63.6 UNDERWEIGHT: ICD-10-CM

## 2025-01-28 PROCEDURE — G2211 COMPLEX E/M VISIT ADD ON: HCPCS | Performed by: PHYSICIAN ASSISTANT

## 2025-01-28 PROCEDURE — 99213 OFFICE O/P EST LOW 20 MIN: CPT | Performed by: PHYSICIAN ASSISTANT

## 2025-01-28 PROCEDURE — G0438 PPPS, INITIAL VISIT: HCPCS | Performed by: PHYSICIAN ASSISTANT

## 2025-01-28 RX ORDER — AMLODIPINE BESYLATE 10 MG/1
10 TABLET ORAL DAILY
Qty: 90 TABLET | Refills: 5 | Status: SHIPPED | OUTPATIENT
Start: 2025-01-28

## 2025-01-28 NOTE — PATIENT INSTRUCTIONS
Medicare Preventive Visit Patient Instructions  Thank you for completing your Welcome to Medicare Visit or Medicare Annual Wellness Visit today. Your next wellness visit will be due in one year (1/29/2026).  The screening/preventive services that you may require over the next 5-10 years are detailed below. Some tests may not apply to you based off risk factors and/or age. Screening tests ordered at today's visit but not completed yet may show as past due. Also, please note that scanned in results may not display below.  Preventive Screenings:  Service Recommendations Previous Testing/Comments   Colorectal Cancer Screening  * Colonoscopy    * Fecal Occult Blood Test (FOBT)/Fecal Immunochemical Test (FIT)  * Fecal DNA/Cologuard Test  * Flexible Sigmoidoscopy Age: 45-75 years old   Colonoscopy: every 10 years (may be performed more frequently if at higher risk)  OR  FOBT/FIT: every 1 year  OR  Cologuard: every 3 years  OR  Sigmoidoscopy: every 5 years  Screening may be recommended earlier than age 45 if at higher risk for colorectal cancer. Also, an individualized decision between you and your healthcare provider will decide whether screening between the ages of 76-85 would be appropriate. Colonoscopy: Not on file  FOBT/FIT: Not on file  Cologuard: Not on file  Sigmoidoscopy: Not on file          Breast Cancer Screening Age: 40+ years old  Frequency: every 1-2 years  Not required if history of left and right mastectomy Mammogram: 06/14/2023    Screening Current   Cervical Cancer Screening Between the ages of 21-29, pap smear recommended once every 3 years.   Between the ages of 30-65, can perform pap smear with HPV co-testing every 5 years.   Recommendations may differ for women with a history of total hysterectomy, cervical cancer, or abnormal pap smears in past. Pap Smear: Not on file    Screening Not Indicated   Hepatitis C Screening Once for adults born between 1945 and 1965  More frequently in patients at high  risk for Hepatitis C Hep C Antibody: Not on file        Diabetes Screening 1-2 times per year if you're at risk for diabetes or have pre-diabetes Fasting glucose: 92 mg/dL (8/24/2023)  A1C: No results in last 5 years (No results in last 5 years)      Cholesterol Screening Once every 5 years if you don't have a lipid disorder. May order more often based on risk factors. Lipid panel: 08/24/2023    Screening Not Indicated  History Lipid Disorder     Other Preventive Screenings Covered by Medicare:  Abdominal Aortic Aneurysm (AAA) Screening: covered once if your at risk. You're considered to be at risk if you have a family history of AAA.  Lung Cancer Screening: covers low dose CT scan once per year if you meet all of the following conditions: (1) Age 55-77; (2) No signs or symptoms of lung cancer; (3) Current smoker or have quit smoking within the last 15 years; (4) You have a tobacco smoking history of at least 20 pack years (packs per day multiplied by number of years you smoked); (5) You get a written order from a healthcare provider.  Glaucoma Screening: covered annually if you're considered high risk: (1) You have diabetes OR (2) Family history of glaucoma OR (3)  aged 50 and older OR (4)  American aged 65 and older  Osteoporosis Screening: covered every 2 years if you meet one of the following conditions: (1) You're estrogen deficient and at risk for osteoporosis based off medical history and other findings; (2) Have a vertebral abnormality; (3) On glucocorticoid therapy for more than 3 months; (4) Have primary hyperparathyroidism; (5) On osteoporosis medications and need to assess response to drug therapy.   Last bone density test (DXA Scan): Not on file.  HIV Screening: covered annually if you're between the age of 15-65. Also covered annually if you are younger than 15 and older than 65 with risk factors for HIV infection. For pregnant patients, it is covered up to 3 times per  pregnancy.    Immunizations:  Immunization Recommendations   Influenza Vaccine Annual influenza vaccination during flu season is recommended for all persons aged >= 6 months who do not have contraindications   Pneumococcal Vaccine   * Pneumococcal conjugate vaccine = PCV13 (Prevnar 13), PCV15 (Vaxneuvance), PCV20 (Prevnar 20)  * Pneumococcal polysaccharide vaccine = PPSV23 (Pneumovax) Adults 19-63 yo with certain risk factors or if 65+ yo  If never received any pneumonia vaccine: recommend Prevnar 20 (PCV20)  Give PCV20 if previously received 1 dose of PCV13 or PPSV23   Hepatitis B Vaccine 3 dose series if at intermediate or high risk (ex: diabetes, end stage renal disease, liver disease)   Respiratory syncytial virus (RSV) Vaccine - COVERED BY MEDICARE PART D  * RSVPreF3 (Arexvy) CDC recommends that adults 60 years of age and older may receive a single dose of RSV vaccine using shared clinical decision-making (SCDM)   Tetanus (Td) Vaccine - COST NOT COVERED BY MEDICARE PART B Following completion of primary series, a booster dose should be given every 10 years to maintain immunity against tetanus. Td may also be given as tetanus wound prophylaxis.   Tdap Vaccine - COST NOT COVERED BY MEDICARE PART B Recommended at least once for all adults. For pregnant patients, recommended with each pregnancy.   Shingles Vaccine (Shingrix) - COST NOT COVERED BY MEDICARE PART B  2 shot series recommended in those 19 years and older who have or will have weakened immune systems or those 50 years and older     Health Maintenance Due:      Topic Date Due   • Hepatitis C Screening  Never done   • Colorectal Cancer Screening  Never done   • Breast Cancer Screening: Mammogram  06/14/2024     Immunizations Due:      Topic Date Due   • Influenza Vaccine (1) 09/01/2024   • COVID-19 Vaccine (3 - 2024-25 season) 09/01/2024     Advance Directives   What are advance directives?  Advance directives are legal documents that state your wishes and  plans for medical care. These plans are made ahead of time in case you lose your ability to make decisions for yourself. Advance directives can apply to any medical decision, such as the treatments you want, and if you want to donate organs.   What are the types of advance directives?  There are many types of advance directives, and each state has rules about how to use them. You may choose a combination of any of the following:  Living will:  This is a written record of the treatment you want. You can also choose which treatments you do not want, which to limit, and which to stop at a certain time. This includes surgery, medicine, IV fluid, and tube feedings.   Durable power of  for healthcare (DPAHC):  This is a written record that states who you want to make healthcare choices for you when you are unable to make them for yourself. This person, called a proxy, is usually a family member or a friend. You may choose more than 1 proxy.  Do not resuscitate (DNR) order:  A DNR order is used in case your heart stops beating or you stop breathing. It is a request not to have certain forms of treatment, such as CPR. A DNR order may be included in other types of advance directives.  Medical directive:  This covers the care that you want if you are in a coma, near death, or unable to make decisions for yourself. You can list the treatments you want for each condition. Treatment may include pain medicine, surgery, blood transfusions, dialysis, IV or tube feedings, and a ventilator (breathing machine).  Values history:  This document has questions about your views, beliefs, and how you feel and think about life. This information can help others choose the care that you would choose.  Why are advance directives important?  An advance directive helps you control your care. Although spoken wishes may be used, it is better to have your wishes written down. Spoken wishes can be misunderstood, or not followed. Treatments  may be given even if you do not want them. An advance directive may make it easier for your family to make difficult choices about your care.   Cigarette Smoking and Your Health   Risks to your health if you smoke:  Nicotine and other chemicals found in tobacco damage every cell in your body. Even if you are a light smoker, you have an increased risk for cancer, heart disease, and lung disease. If you are pregnant or have diabetes, smoking increases your risk for complications.   Benefits to your health if you stop smoking:   You decrease respiratory symptoms such as coughing, wheezing, and shortness of breath.   You reduce your risk for cancers of the lung, mouth, throat, kidney, bladder, pancreas, stomach, and cervix. If you already have cancer, you increase the benefits of chemotherapy. You also reduce your risk for cancer returning or a second cancer from developing.   You reduce your risk for heart disease, blood clots, heart attack, and stroke.   You reduce your risk for lung infections, and diseases such as pneumonia, asthma, chronic bronchitis, and emphysema.  Your circulation improves. More oxygen can be delivered to your body. If you have diabetes, you lower your risk for complications, such as kidney, artery, and eye diseases. You also lower your risk for nerve damage. Nerve damage can lead to amputations, poor vision, and blindness.  You improve your body's ability to heal and to fight infections.  For more information and support to stop smoking:   [employer]  Phone: 5- 610 - 145-7156  Web Address: www.[employer]  Underweight  Underweight is defined as having a body mass index (BMI) of less than 18.5 kg/m2   Anorexia  is a loss of appetite, decreased food intake, or both. Your appetite naturally decreases as you get older. You also get full faster than you used to. This occurs because your body needs less energy. Other body changes can also lead to a decreased appetite. Even though some appetite  loss is normal, you still need to get enough calories and nutrients to keep you healthy. You can start to lose too much weight if you do not eat as much food as your body needs. Unwanted weight loss can cause health problems, or worsen health problems you already have. You can also become dehydrated if you do not drink enough liquid.  How to eat healthy and get enough nutrients:   Choose healthy foods.  Eat a variety of fruits, vegetables, whole grains, low-fat dairy foods, lean meats, and other protein foods. Limit foods high in fat, sugar, and salt. Limit or avoid alcohol as directed. Work with a dietitian to help you plan your meals if you need to follow a special diet. A dietitian can also teach you how to modify foods if you have trouble chewing or swallowing.   Snack on healthy foods between meals  if you only eat a small amount during meals. Snacks provide extra healthy nutrients and calories between meals. Examples include fruit, cheese, and whole grain crackers.   Drink liquids as directed  to avoid dehydration. Drink liquids between meals if they cause you to get full too quickly during meals. Ask how much liquid to drink each day and which liquids are best for you.   Use herbs, spices, and flavor enhancers to add flavor to foods.  Avoid using herbs and spice blends that also contain sodium. Ask your healthcare provider or dietitian about flavor enhancers. Flavor enhancers with ham, natural loaiza, and roast beef flavors can also be sprinkled on food to add flavor.   Share meals with others as often as you can.  Eating with others may help you to eat better during meal time. Ask family members, neighbors, or friends to join you for lunch. There are also senior centers where you can meet people, and share meals with them.   Ask family and friends for help  with shopping or preparing foods. Ask for a ride to the grocery store, if needed.       © Copyright [employer] 2018 Information is for End User's use  only and may not be sold, redistributed or otherwise used for commercial purposes. All illustrations and images included in CareNotes® are the copyrighted property of [email or web address] or [employer]

## 2025-01-28 NOTE — ASSESSMENT & PLAN NOTE
Patient has history of hypertension.  She is adherent with the amlodipine and the refill was ordered for her today.  Orders:  •  amLODIPine (NORVASC) 10 mg tablet; Take 1 tablet (10 mg total) by mouth daily

## 2025-01-28 NOTE — PROGRESS NOTES
Name: Corie Pal      : 1955      MRN: [id number]  Encounter Provider: Gabriela Menendez PA-C  Encounter Date: 2025   Encounter department: [facility] PRIMARY CARE    Assessment & Plan  Medicare annual wellness visit, subsequent  Medicare annual wellness subsequent visit completed today.  Patient has had a rough last couple years from an emotional standpoint.  2 close members of her family have been diagnosed with breast cancer including the death of 1.  She has been trying to help out her niece who was diagnosed with breast cancer at age 34 caring for her then 2-year-old daughter and staying at [facility] in [place] while she received treatment.       Essential hypertension  Patient has history of hypertension.  She is adherent with the amlodipine and the refill was ordered for her today.  Orders:  •  amLODIPine (NORVASC) 10 mg tablet; Take 1 tablet (10 mg total) by mouth daily    Encounter for screening mammogram for breast cancer  Patient sister and niece diagnosed with breast cancer.  History of left breast atypia in the past.  Overdue for mammogram and this was ordered today.  Orders:  •  Mammo screening bilateral w 3d and cad; Future    Age-related osteoporosis without current pathological fracture  Patient last had screening for osteoporosis by Dr. Donato Hanson in .  She has had fragility fractures in the past and has had to wear multiple walking boots.  No recent fracture.  Will repeat DEXA scan to see if treatment is in order.  Orders:  •  DXA bone density spine hip and pelvis; Future    BMI less than 19,adult  Patient's BMI is currently 16.47.  She states that she has enough food to eat.  She has been thin all her life but she has lost weight recently which she relates to being very stressed.  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       Tobacco use  Patient  continues to smoke but admits to vaping more.  1/7 of a pack a day       Vaping nicotine dependence, tobacco product  Patient admits to vaping a nicotine product.  She is in the precontemplation stage.  She said this is not the best time for her to stop smoking given that she has had a lot of problems with her family including diagnoses of breast cancer and seeing her nephew who was not emotionally well.       Colon cancer screening declined  Patient states she had a colonoscopy in the past and almost  during the procedure.  She refuses to have another.  Polyps were found at that time making her not eligible for Cologuard.       Underweight  Patient has enough food to eat.  She is eating a lot of nuts for protein.  Appetite is normal.  Feels that stress is causing a decrease in her weight.       Subacute cough  Patient had upper respiratory tract infection several weeks ago and continues to have intermittent coughing.  This feels like the chronic coughing she had when she had COVID several years ago.       Grieving  Patient sister  of breast cancer.  Niece currently has breast cancer the nephew having a lot of psychiatric issues.  She has not overcome the death of her sister.          Preventive health issues were discussed with patient, and age appropriate screening tests were ordered as noted in patient's After Visit Summary. Personalized health advice and appropriate referrals for health education or preventive services given if needed, as noted in patient's After Visit Summary.    History of Present Illness     HPI: 69-year-old female seen for her Medicare annual wellness.  She does not want lung cancer screening at this time.  She is declining colon cancer screening after a near death experience during her last colonoscopy.  She is willing to get her overdue mammogram done along with a DEXA scan and she is going to call to see if they can accommodate both at the same time.    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  She is trying to help out with several family members who are undergoing significant mental and physical challenges.  She enjoys her work in the shoe department and also gets her around people.  Several members of her team have expressed displeasure because she no longer has to work weekends    She continues to have an ongoing cough.  Some sputum production.  She continues to smoke and vape and is not interested in stopping at this time.    No changes in her bowel or bladder habits.  Is not interested in starting a medication to help control her cholesterol.  Patient Care Team:  Gabriela Menendez PA-C as PCP - General (Family Medicine)  Robert Garrett MD    Review of Systems: Ongoing coughing.  Had an URI that has not yet resolved after several weeks.  Cough is similar to the lingering cough that she had when she had COVID.  Had not been tested for COVID as a cause of her upper respiratory infection.  Reviewed all of the wellness questions.  Medical History Reviewed by provider this encounter:  Tobacco  Allergies  Meds  Problems  Med Hx  Surg Hx  Fam Hx       Annual Wellness Visit Questionnaire       Health Risk Assessment:   Patient rates overall health as good. Patient feels that their physical health rating is same. Patient is satisfied with their life. Eyesight was rated as same. Hearing was rated as same. Patient feels that their emotional and mental health rating is same. Patients states they are never, rarely angry. Patient states they are sometimes unusually tired/fatigued. Pain experienced in the last 7 days has been none. Patient states that she has experienced no weight loss or gain in last 6 months.     Depression Screening:   PHQ-2 Score: 0      Fall Risk Screening:   In the past year, patient has experienced: no history of falling in past year      Urinary Incontinence Screening:   Patient has not leaked urine accidently in the last six months.      Home Safety:  Patient does not have trouble with stairs inside or outside of their home. Patient has working smoke alarms and has working carbon monoxide detector. Home safety hazards include: none.     Nutrition:   Current diet is Regular.     Medications:   Patient is currently taking over-the-counter supplements. OTC medications include: see medication list. Patient is able to manage medications.     Activities of Daily Living (ADLs)/Instrumental Activities of Daily Living (IADLs):   Walk and transfer into and out of bed and chair?: Yes  Dress and groom yourself?: Yes    Bathe or shower yourself?: Yes    Feed yourself? Yes  Do your laundry/housekeeping?: Yes  Manage your money, pay your bills and track your expenses?: Yes  Make your own meals?: Yes    Do your own shopping?: Yes    Previous Hospitalizations:   Any hospitalizations or ED visits within the last 12 months?: No      Advance Care Planning:   Living will: Yes    Durable POA for healthcare: Yes    Advanced directive: Yes      PREVENTIVE SCREENINGS      Cardiovascular Screening:    General: Screening Not Indicated and History Lipid Disorder      Breast Cancer Screening:     General: Screening Current      Cervical Cancer Screening:    General: Screening Not Indicated      Lung Cancer Screening:     General: Screening Not Indicated    Screening, Brief Intervention, and Referral to Treatment (SBIRT)    Screening    Typical number of drinks in a week: 3    Single Item Drug Screening:  How often have you used an illegal drug (including marijuana) or a prescription medication for non-medical reasons in the past year? never    Single Item Drug Screen Score: 0  Interpretation: Negative screen for possible drug use disorder    Social Drivers of Health     Financial Resource Strain: Low Risk  (1/28/2025)    Overall Financial Resource Strain (CARDIA)    • Difficulty of Paying Living Expenses: Not hard at all   Food Insecurity: No Food Insecurity (1/28/2025)     "Hunger Vital Sign    • Worried About Running Out of Food in the Last Year: Never true    • Ran Out of Food in the Last Year: Never true   Transportation Needs: No Transportation Needs (1/28/2025)    PRAPARE - Transportation    • Lack of Transportation (Medical): No    • Lack of Transportation (Non-Medical): No   Housing Stability: Unknown (1/28/2025)    Housing Stability Vital Sign    • Unable to Pay for Housing in the Last Year: No    • Homeless in the Last Year: No   Utilities: Not At Risk (1/28/2025)    [facility] Utilities    • Threatened with loss of utilities: No     No results found.    Objective   /82   Pulse 84   Temp (!) 96.4 °F (35.8 °C) (Tympanic)   Ht 5' 7\" (1.702 m)   Wt 47.7 kg (105 lb 2.6 oz)   SpO2 98%   BMI 16.47 kg/m²     Physical Exam: Reviewed vital signs.  She is normotensive and afebrile.    Heart is regular rate without murmur rub or gallops.  Lungs are clear with some end expiratory wheezing.  Aerating bases without using accessory muscles of respiration.  Able to speak in full sentences.  Administrative Statements   I have spent a total time of 35 minutes in caring for this patient on the day of the visit/encounter including Diagnostic results, Prognosis, Risks and benefits of tx options, Instructions for management, Patient and family education, Importance of tx compliance, Risk factor reductions, Impressions, Documenting in the medical record, Reviewing / ordering tests, medicine, procedures  , and Obtaining or reviewing history  .    I will see the patient in 3 months to assess her mental state, ability to maintain her weight, and her blood pressure and lipid profile.  "

## 2025-02-10 ENCOUNTER — HOSPITAL ENCOUNTER (EMERGENCY)
Facility: HOSPITAL | Age: 70
End: 2025-02-10
Attending: STUDENT IN AN ORGANIZED HEALTH CARE EDUCATION/TRAINING PROGRAM
Payer: MEDICARE

## 2025-02-10 ENCOUNTER — APPOINTMENT (EMERGENCY)
Dept: NON INVASIVE DIAGNOSTICS | Facility: HOSPITAL | Age: 70
End: 2025-02-10
Payer: MEDICARE

## 2025-02-10 ENCOUNTER — HOSPITAL ENCOUNTER (INPATIENT)
Facility: HOSPITAL | Age: 70
LOS: 3 days | Discharge: HOME | DRG: 287 | End: 2025-02-13
Attending: INTERNAL MEDICINE | Admitting: INTERNAL MEDICINE
Payer: MEDICARE

## 2025-02-10 ENCOUNTER — APPOINTMENT (EMERGENCY)
Dept: CT IMAGING | Facility: HOSPITAL | Age: 70
End: 2025-02-10
Payer: MEDICARE

## 2025-02-10 ENCOUNTER — APPOINTMENT (EMERGENCY)
Dept: RADIOLOGY | Facility: HOSPITAL | Age: 70
End: 2025-02-10
Payer: MEDICARE

## 2025-02-10 VITALS
SYSTOLIC BLOOD PRESSURE: 152 MMHG | DIASTOLIC BLOOD PRESSURE: 65 MMHG | HEART RATE: 79 BPM | OXYGEN SATURATION: 97 % | TEMPERATURE: 98.1 F | HEIGHT: 67 IN | BODY MASS INDEX: 16.95 KG/M2 | RESPIRATION RATE: 22 BRPM | WEIGHT: 108 LBS

## 2025-02-10 DIAGNOSIS — I21.4 NSTEMI (NON-ST ELEVATED MYOCARDIAL INFARCTION) (HCC): Primary | ICD-10-CM

## 2025-02-10 DIAGNOSIS — R79.89 ELEVATED TROPONIN LEVEL NOT DUE TO ACUTE CORONARY SYNDROME: ICD-10-CM

## 2025-02-10 DIAGNOSIS — R00.2 PALPITATIONS: Primary | ICD-10-CM

## 2025-02-10 DIAGNOSIS — I25.10 CORONARY ARTERY DISEASE: ICD-10-CM

## 2025-02-10 DIAGNOSIS — R59.0 MEDIASTINAL LYMPHADENOPATHY: ICD-10-CM

## 2025-02-10 DIAGNOSIS — R91.8 ABNORMAL CT SCAN OF LUNG: ICD-10-CM

## 2025-02-10 DIAGNOSIS — I24.9 ACS (ACUTE CORONARY SYNDROME) (HCC): ICD-10-CM

## 2025-02-10 DIAGNOSIS — Z72.0 TOBACCO USE: ICD-10-CM

## 2025-02-10 DIAGNOSIS — R05.9 COUGH: ICD-10-CM

## 2025-02-10 DIAGNOSIS — E78.2 MIXED HYPERLIPIDEMIA: ICD-10-CM

## 2025-02-10 DIAGNOSIS — R79.89 ELEVATED TROPONIN: ICD-10-CM

## 2025-02-10 PROBLEM — N64.4 BREAST PAIN: Status: RESOLVED | Noted: 2019-11-18 | Resolved: 2025-02-10

## 2025-02-10 PROBLEM — R93.89 ABNORMAL CT OF THE CHEST: Status: ACTIVE | Noted: 2025-02-10

## 2025-02-10 PROBLEM — Z80.3 FAMILY HISTORY OF BREAST CANCER: Status: RESOLVED | Noted: 2019-11-18 | Resolved: 2025-02-10

## 2025-02-10 LAB
2HR DELTA HS TROPONIN: 3 NG/L
4HR DELTA HS TROPONIN: -16 NG/L
ALBUMIN SERPL BCG-MCNC: 3.8 G/DL (ref 3.5–5)
ALP SERPL-CCNC: 69 U/L (ref 34–104)
ALT SERPL W P-5'-P-CCNC: 14 U/L (ref 7–52)
ANION GAP SERPL CALCULATED.3IONS-SCNC: 8 MMOL/L (ref 4–13)
AORTIC ROOT: 3.5 CM
APTT PPP: 24 SECONDS (ref 23–34)
APTT PPP: 29 SECONDS (ref 23–34)
AST SERPL W P-5'-P-CCNC: 32 U/L (ref 13–39)
ATRIAL RATE: 70 BPM
ATRIAL RATE: 71 BPM
ATRIAL RATE: 71 BPM
BASOPHILS # BLD AUTO: 0.05 THOUSANDS/ΜL (ref 0–0.1)
BASOPHILS NFR BLD AUTO: 1 % (ref 0–1)
BILIRUB SERPL-MCNC: 0.56 MG/DL (ref 0.2–1)
BSA FOR ECHO PROCEDURE: 1.56 M2
BUN SERPL-MCNC: 8 MG/DL (ref 5–25)
CALCIUM SERPL-MCNC: 8.7 MG/DL (ref 8.4–10.2)
CARDIAC TROPONIN I PNL SERPL HS: 408 NG/L (ref ?–50)
CARDIAC TROPONIN I PNL SERPL HS: 424 NG/L (ref ?–50)
CARDIAC TROPONIN I PNL SERPL HS: 427 NG/L (ref ?–50)
CHLORIDE SERPL-SCNC: 103 MMOL/L (ref 96–108)
CO2 SERPL-SCNC: 28 MMOL/L (ref 21–32)
CREAT SERPL-MCNC: 0.41 MG/DL (ref 0.6–1.3)
D DIMER PPP FEU-MCNC: 0.63 UG/ML FEU
E WAVE DECELERATION TIME: 456 MS
E/A RATIO: 1.35
EOSINOPHIL # BLD AUTO: 0.24 THOUSAND/ΜL (ref 0–0.61)
EOSINOPHIL NFR BLD AUTO: 2 % (ref 0–6)
ERYTHROCYTE [DISTWIDTH] IN BLOOD BY AUTOMATED COUNT: 12.6 % (ref 11.6–15.1)
FRACTIONAL SHORTENING: 26 (ref 28–44)
GFR SERPL CREATININE-BSD FRML MDRD: 106 ML/MIN/1.73SQ M
GLUCOSE SERPL-MCNC: 92 MG/DL (ref 65–140)
HCT VFR BLD AUTO: 50.3 % (ref 34.8–46.1)
HGB BLD-MCNC: 16.4 G/DL (ref 11.5–15.4)
IMM GRANULOCYTES # BLD AUTO: 0.06 THOUSAND/UL (ref 0–0.2)
IMM GRANULOCYTES NFR BLD AUTO: 1 % (ref 0–2)
INR PPP: 0.92 (ref 0.85–1.19)
INTERVENTRICULAR SEPTUM IN DIASTOLE (PARASTERNAL SHORT AXIS VIEW): 1.2 CM
INTERVENTRICULAR SEPTUM: 1.2 CM (ref 0.6–1.1)
LAAS-AP2: 5.6 CM2
LAAS-AP4: 6.9 CM2
LEFT ATRIUM SIZE: 3 CM
LEFT ATRIUM VOLUME (MOD BIPLANE): 12 ML
LEFT ATRIUM VOLUME INDEX (MOD BIPLANE): 7.7 ML/M2
LEFT INTERNAL DIMENSION IN SYSTOLE: 3.1 CM (ref 2.1–4)
LEFT VENTRICULAR INTERNAL DIMENSION IN DIASTOLE: 4.2 CM (ref 3.5–6)
LEFT VENTRICULAR POSTERIOR WALL IN END DIASTOLE: 1.1 CM
LEFT VENTRICULAR STROKE VOLUME: 39 ML
LV EF US.2D.A4C+ESTIMATED: 62 %
LVSV (TEICH): 39 ML
LYMPHOCYTES # BLD AUTO: 0.83 THOUSANDS/ΜL (ref 0.6–4.47)
LYMPHOCYTES NFR BLD AUTO: 8 % (ref 14–44)
MAGNESIUM SERPL-MCNC: 2 MG/DL (ref 1.9–2.7)
MCH RBC QN AUTO: 32.5 PG (ref 26.8–34.3)
MCHC RBC AUTO-ENTMCNC: 32.6 G/DL (ref 31.4–37.4)
MCV RBC AUTO: 100 FL (ref 82–98)
MONOCYTES # BLD AUTO: 0.63 THOUSAND/ΜL (ref 0.17–1.22)
MONOCYTES NFR BLD AUTO: 6 % (ref 4–12)
MV E'TISSUE VEL-SEP: 8 CM/S
MV PEAK A VEL: 0.48 M/S
MV PEAK E VEL: 65 CM/S
MV STENOSIS PRESSURE HALF TIME: 132 MS
MV VALVE AREA P 1/2 METHOD: 1.67
NEUTROPHILS # BLD AUTO: 8.02 THOUSANDS/ΜL (ref 1.85–7.62)
NEUTS SEG NFR BLD AUTO: 82 % (ref 43–75)
NRBC BLD AUTO-RTO: 0 /100 WBCS
P AXIS: 65 DEGREES
P AXIS: 68 DEGREES
P AXIS: 76 DEGREES
PLATELET # BLD AUTO: 244 THOUSANDS/UL (ref 149–390)
PMV BLD AUTO: 9.4 FL (ref 8.9–12.7)
POTASSIUM SERPL-SCNC: 4 MMOL/L (ref 3.5–5.3)
PR INTERVAL: 144 MS
PR INTERVAL: 148 MS
PR INTERVAL: 152 MS
PROCALCITONIN SERPL-MCNC: <0.05 NG/ML
PROT SERPL-MCNC: 6.6 G/DL (ref 6.4–8.4)
PROTHROMBIN TIME: 12.8 SECONDS (ref 12.3–15)
QRS AXIS: -9 DEGREES
QRS AXIS: 15 DEGREES
QRS AXIS: 67 DEGREES
QRSD INTERVAL: 82 MS
QRSD INTERVAL: 86 MS
QRSD INTERVAL: 88 MS
QT INTERVAL: 364 MS
QT INTERVAL: 492 MS
QT INTERVAL: 500 MS
QTC INTERVAL: 395 MS
QTC INTERVAL: 534 MS
QTC INTERVAL: 540 MS
RBC # BLD AUTO: 5.05 MILLION/UL (ref 3.81–5.12)
RIGHT ATRIUM AREA SYSTOLE A4C: 8.8 CM2
RIGHT VENTRICLE ID DIMENSION: 2.5 CM
SL CV LEFT ATRIUM LENGTH A2C: 2.7 CM
SL CV LV EF: 65
SL CV PED ECHO LEFT VENTRICLE DIASTOLIC VOLUME (MOD BIPLANE) 2D: 78 ML
SL CV PED ECHO LEFT VENTRICLE SYSTOLIC VOLUME (MOD BIPLANE) 2D: 38 ML
SODIUM SERPL-SCNC: 139 MMOL/L (ref 135–147)
T WAVE AXIS: 15 DEGREES
T WAVE AXIS: 32 DEGREES
T WAVE AXIS: 32 DEGREES
TRICUSPID ANNULAR PLANE SYSTOLIC EXCURSION: 2.3 CM
TSH SERPL DL<=0.05 MIU/L-ACNC: 1.45 UIU/ML (ref 0.45–4.5)
VENTRICULAR RATE: 70 BPM
VENTRICULAR RATE: 71 BPM
VENTRICULAR RATE: 71 BPM
WBC # BLD AUTO: 9.83 THOUSAND/UL (ref 4.31–10.16)

## 2025-02-10 PROCEDURE — 71045 X-RAY EXAM CHEST 1 VIEW: CPT

## 2025-02-10 PROCEDURE — 85610 PROTHROMBIN TIME: CPT | Performed by: STUDENT IN AN ORGANIZED HEALTH CARE EDUCATION/TRAINING PROGRAM

## 2025-02-10 PROCEDURE — 93010 ELECTROCARDIOGRAM REPORT: CPT | Performed by: INTERNAL MEDICINE

## 2025-02-10 PROCEDURE — 80061 LIPID PANEL: CPT

## 2025-02-10 PROCEDURE — 80053 COMPREHEN METABOLIC PANEL: CPT

## 2025-02-10 PROCEDURE — 93005 ELECTROCARDIOGRAM TRACING: CPT

## 2025-02-10 PROCEDURE — 93306 TTE W/DOPPLER COMPLETE: CPT | Performed by: INTERNAL MEDICINE

## 2025-02-10 PROCEDURE — 85730 THROMBOPLASTIN TIME PARTIAL: CPT | Performed by: STUDENT IN AN ORGANIZED HEALTH CARE EDUCATION/TRAINING PROGRAM

## 2025-02-10 PROCEDURE — 84484 ASSAY OF TROPONIN QUANT: CPT

## 2025-02-10 PROCEDURE — 96376 TX/PRO/DX INJ SAME DRUG ADON: CPT

## 2025-02-10 PROCEDURE — 83735 ASSAY OF MAGNESIUM: CPT | Performed by: STUDENT IN AN ORGANIZED HEALTH CARE EDUCATION/TRAINING PROGRAM

## 2025-02-10 PROCEDURE — 96375 TX/PRO/DX INJ NEW DRUG ADDON: CPT

## 2025-02-10 PROCEDURE — 99285 EMERGENCY DEPT VISIT HI MDM: CPT

## 2025-02-10 PROCEDURE — 93306 TTE W/DOPPLER COMPLETE: CPT

## 2025-02-10 PROCEDURE — 71250 CT THORAX DX C-: CPT

## 2025-02-10 PROCEDURE — 36415 COLL VENOUS BLD VENIPUNCTURE: CPT

## 2025-02-10 PROCEDURE — 85025 COMPLETE CBC W/AUTO DIFF WBC: CPT

## 2025-02-10 PROCEDURE — 99291 CRITICAL CARE FIRST HOUR: CPT | Performed by: STUDENT IN AN ORGANIZED HEALTH CARE EDUCATION/TRAINING PROGRAM

## 2025-02-10 PROCEDURE — 99223 1ST HOSP IP/OBS HIGH 75: CPT

## 2025-02-10 PROCEDURE — 96367 TX/PROPH/DG ADDL SEQ IV INF: CPT

## 2025-02-10 PROCEDURE — 84145 PROCALCITONIN (PCT): CPT | Performed by: STUDENT IN AN ORGANIZED HEALTH CARE EDUCATION/TRAINING PROGRAM

## 2025-02-10 PROCEDURE — 96366 THER/PROPH/DIAG IV INF ADDON: CPT

## 2025-02-10 PROCEDURE — 84443 ASSAY THYROID STIM HORMONE: CPT | Performed by: STUDENT IN AN ORGANIZED HEALTH CARE EDUCATION/TRAINING PROGRAM

## 2025-02-10 PROCEDURE — 85379 FIBRIN DEGRADATION QUANT: CPT | Performed by: STUDENT IN AN ORGANIZED HEALTH CARE EDUCATION/TRAINING PROGRAM

## 2025-02-10 PROCEDURE — 96365 THER/PROPH/DIAG IV INF INIT: CPT

## 2025-02-10 RX ORDER — ONDANSETRON 2 MG/ML
4 INJECTION INTRAMUSCULAR; INTRAVENOUS ONCE
Status: COMPLETED | OUTPATIENT
Start: 2025-02-10 | End: 2025-02-10

## 2025-02-10 RX ORDER — METOPROLOL TARTRATE 25 MG/1
25 TABLET, FILM COATED ORAL EVERY 12 HOURS SCHEDULED
Status: DISCONTINUED | OUTPATIENT
Start: 2025-02-11 | End: 2025-02-12

## 2025-02-10 RX ORDER — ONDANSETRON 2 MG/ML
INJECTION INTRAMUSCULAR; INTRAVENOUS
Status: COMPLETED
Start: 2025-02-10 | End: 2025-02-10

## 2025-02-10 RX ORDER — HEPARIN SODIUM 1000 [USP'U]/ML
2700 INJECTION, SOLUTION INTRAVENOUS; SUBCUTANEOUS EVERY 6 HOURS PRN
Status: DISCONTINUED | OUTPATIENT
Start: 2025-02-10 | End: 2025-02-11

## 2025-02-10 RX ORDER — MAGNESIUM HYDROXIDE/ALUMINUM HYDROXICE/SIMETHICONE 120; 1200; 1200 MG/30ML; MG/30ML; MG/30ML
30 SUSPENSION ORAL EVERY 6 HOURS PRN
Status: DISCONTINUED | OUTPATIENT
Start: 2025-02-10 | End: 2025-02-13 | Stop reason: HOSPADM

## 2025-02-10 RX ORDER — ASPIRIN 81 MG/1
324 TABLET, CHEWABLE ORAL ONCE
Status: COMPLETED | OUTPATIENT
Start: 2025-02-10 | End: 2025-02-10

## 2025-02-10 RX ORDER — ONDANSETRON 2 MG/ML
4 INJECTION INTRAMUSCULAR; INTRAVENOUS EVERY 6 HOURS PRN
Status: DISCONTINUED | OUTPATIENT
Start: 2025-02-10 | End: 2025-02-10

## 2025-02-10 RX ORDER — HEPARIN SODIUM 10000 [USP'U]/100ML
3-20 INJECTION, SOLUTION INTRAVENOUS
Status: DISCONTINUED | OUTPATIENT
Start: 2025-02-10 | End: 2025-02-11

## 2025-02-10 RX ORDER — POLYETHYLENE GLYCOL 3350 17 G/17G
17 POWDER, FOR SOLUTION ORAL DAILY PRN
Status: DISCONTINUED | OUTPATIENT
Start: 2025-02-10 | End: 2025-02-13 | Stop reason: HOSPADM

## 2025-02-10 RX ORDER — ACETAMINOPHEN 325 MG/1
650 TABLET ORAL EVERY 6 HOURS PRN
Status: DISCONTINUED | OUTPATIENT
Start: 2025-02-10 | End: 2025-02-13 | Stop reason: HOSPADM

## 2025-02-10 RX ORDER — ASPIRIN 81 MG/1
81 TABLET, CHEWABLE ORAL DAILY
Status: DISCONTINUED | OUTPATIENT
Start: 2025-02-11 | End: 2025-02-13 | Stop reason: HOSPADM

## 2025-02-10 RX ORDER — HEPARIN SODIUM 1000 [USP'U]/ML
1350 INJECTION, SOLUTION INTRAVENOUS; SUBCUTANEOUS EVERY 6 HOURS PRN
Status: DISCONTINUED | OUTPATIENT
Start: 2025-02-10 | End: 2025-02-11

## 2025-02-10 RX ORDER — CEFTRIAXONE 1 G/50ML
1000 INJECTION, SOLUTION INTRAVENOUS ONCE
Status: COMPLETED | OUTPATIENT
Start: 2025-02-10 | End: 2025-02-10

## 2025-02-10 RX ORDER — CARVEDILOL 6.25 MG/1
6.25 TABLET ORAL 2 TIMES DAILY
Status: DISCONTINUED | OUTPATIENT
Start: 2025-02-11 | End: 2025-02-11

## 2025-02-10 RX ORDER — AMLODIPINE BESYLATE 10 MG/1
10 TABLET ORAL DAILY
Status: DISCONTINUED | OUTPATIENT
Start: 2025-02-11 | End: 2025-02-11

## 2025-02-10 RX ORDER — HEPARIN SODIUM 10000 [USP'U]/100ML
3-20 INJECTION, SOLUTION INTRAVENOUS
Status: DISCONTINUED | OUTPATIENT
Start: 2025-02-10 | End: 2025-02-10 | Stop reason: HOSPADM

## 2025-02-10 RX ORDER — GUAIFENESIN 100 MG/5ML
200 SOLUTION ORAL EVERY 4 HOURS PRN
Status: DISCONTINUED | OUTPATIENT
Start: 2025-02-10 | End: 2025-02-13 | Stop reason: HOSPADM

## 2025-02-10 RX ORDER — SODIUM CHLORIDE, SODIUM GLUCONATE, SODIUM ACETATE, POTASSIUM CHLORIDE, MAGNESIUM CHLORIDE, SODIUM PHOSPHATE, DIBASIC, AND POTASSIUM PHOSPHATE .53; .5; .37; .037; .03; .012; .00082 G/100ML; G/100ML; G/100ML; G/100ML; G/100ML; G/100ML; G/100ML
1000 INJECTION, SOLUTION INTRAVENOUS ONCE
Status: COMPLETED | OUTPATIENT
Start: 2025-02-10 | End: 2025-02-10

## 2025-02-10 RX ORDER — NICOTINE 21 MG/24HR
1 PATCH, TRANSDERMAL 24 HOURS TRANSDERMAL DAILY PRN
Status: DISCONTINUED | OUTPATIENT
Start: 2025-02-10 | End: 2025-02-13 | Stop reason: HOSPADM

## 2025-02-10 RX ADMIN — HEPARIN SODIUM 12 UNITS/KG/HR: 10000 INJECTION, SOLUTION INTRAVENOUS at 10:45

## 2025-02-10 RX ADMIN — CEFTRIAXONE 1000 MG: 1 INJECTION, SOLUTION INTRAVENOUS at 12:06

## 2025-02-10 RX ADMIN — ONDANSETRON 4 MG: 2 INJECTION INTRAMUSCULAR; INTRAVENOUS at 21:46

## 2025-02-10 RX ADMIN — ASPIRIN 81 MG CHEWABLE TABLET 324 MG: 81 TABLET CHEWABLE at 10:07

## 2025-02-10 RX ADMIN — TICAGRELOR 180 MG: 90 TABLET ORAL at 11:20

## 2025-02-10 RX ADMIN — SODIUM CHLORIDE, SODIUM GLUCONATE, SODIUM ACETATE, POTASSIUM CHLORIDE, MAGNESIUM CHLORIDE, SODIUM PHOSPHATE, DIBASIC, AND POTASSIUM PHOSPHATE 1000 ML: .53; .5; .37; .037; .03; .012; .00082 INJECTION, SOLUTION INTRAVENOUS at 13:27

## 2025-02-10 RX ADMIN — ONDANSETRON 4 MG: 2 INJECTION INTRAMUSCULAR; INTRAVENOUS at 16:31

## 2025-02-10 NOTE — EMTALA/ACUTE CARE TRANSFER
[facility] EMERGENCY DEPARTMENT  [address]  [facility] [address]  Dept: [phone]      EMTALA TRANSFER CONSENT    NAME Corie Pal                                         1955                              MRN [id number]    I have been informed of my rights regarding examination, treatment, and transfer   by Dr. Kodak Bucio DO    Benefits: Specialized equipment and/or services available at the receiving facility (Include comment)________________________    Risks: Potential for delay in receiving treatment      Consent for Transfer:  I acknowledge that my medical condition has been evaluated and explained to me by the emergency department physician or other qualified medical person and/or my attending physician, who has recommended that I be transferred to the service of  Accepting Physician: Dr. Park at Accepting Facility Name, City & State : [facility]. The above potential benefits of such transfer, the potential risks associated with such transfer, and the probable risks of not being transferred have been explained to me, and I fully understand them.  The doctor has explained that, in my case, the benefits of transfer outweigh the risks.  I agree to be transferred.    I authorize the performance of emergency medical procedures and treatments upon me in both transit and upon arrival at the receiving facility.  Additionally, I authorize the release of any and all medical records to the receiving facility and request they be transported with me, if possible.  I understand that the safest mode of transportation during a medical emergency is an ambulance and that the Hospital advocates the use of this mode of transport. Risks of traveling to the receiving facility by car, including absence of medical control, life sustaining equipment, such as oxygen, and medical personnel has been explained to me and I fully understand them.    (PONCE CORRECT BOX BELOW)  [  ]  I  consent to the stated transfer and to be transported by ambulance/helicopter.  [  ]  I consent to the stated transfer, but refuse transportation by ambulance and accept full responsibility for my transportation by car.  I understand the risks of non-ambulance transfers and I exonerate the Hospital and its staff from any deterioration in my condition that results from this refusal.    X___________________________________________    DATE  02/10/25  TIME________  Signature of patient or legally responsible individual signing on patient behalf           RELATIONSHIP TO PATIENT_________________________          Provider Certification    NAME Corie Pal                                         1955                              MRN [id number]    A medical screening exam was performed on the above named patient.  Based on the examination:    Condition Necessitating Transfer The encounter diagnosis was NSTEMI (non-ST elevated myocardial infarction) (HCC).    Patient Condition: The patient has been stabilized such that within reasonable medical probability, no material deterioration of the patient condition or the condition of the unborn child(naomi) is likely to result from the transfer    Reason for Transfer: Level of Care needed not available at this facility    Transfer Requirements: Facility [facility]   Space available and qualified personnel available for treatment as acknowledged by    Agreed to accept transfer and to provide appropriate medical treatment as acknowledged by       Dr. Park  Appropriate medical records of the examination and treatment of the patient are provided at the time of transfer   STAFF INITIAL WHEN COMPLETED _______  Transfer will be performed by qualified personnel from    and appropriate transfer equipment as required, including the use of necessary and appropriate life support measures.    Provider Certification: I have examined the patient and explained the following  risks and benefits of being transferred/refusing transfer to the patient/family:         Based on these reasonable risks and benefits to the patient and/or the unborn child(naomi), and based upon the information available at the time of the patient’s examination, I certify that the medical benefits reasonably to be expected from the provision of appropriate medical treatments at another medical facility outweigh the increasing risks, if any, to the individual’s medical condition, and in the case of labor to the unborn child, from effecting the transfer.    X____________________________________________ DATE 02/10/25        TIME_______      ORIGINAL - SEND TO MEDICAL RECORDS   COPY - SEND WITH PATIENT DURING TRANSFER

## 2025-02-10 NOTE — LETTER
[facility]  [address] [facility] [address]  Dept: [phone]    February 13, 2025     Patient: Corie Pal   YOB: 1955   Date of Visit: 2/10/2025       To Whom it May Concern:    Corie Pal is under my professional care. She was seen in the hospital from 2/10/2025 to 02/13/25. She may return to work on 2/24/2025 without limitations.    If you have any questions or concerns, please don't hesitate to call.         Sincerely,          Christiano Sorto PA-C

## 2025-02-10 NOTE — ED PROVIDER NOTES
Time reflects when diagnosis was documented in both MDM as applicable and the Disposition within this note       Time User Action Codes Description Comment    2/10/2025 11:02 AM Kodak Bucio Add [I21.4] NSTEMI (non-ST elevated myocardial infarction) (HCC)           ED Disposition       ED Disposition   Transfer to Another Facility-In Network    Condition   --    Date/Time   Mon Feb 10, 2025 11:02 AM    Comment   Corie Pal should be transferred out to [facility]               Assessment & Plan       Medical Decision Making  This patient presents with palpitations, diaphoresis.   Diagnostic considerations include ACS, PE, aortic dissection, pneumonia, pleural effusion.    10:03 AM  Vital signs reviewed.  Patient is currently asymptomatic.  Recovering from pneumonia.  Having intermittent/more frequent episodes of palpitations/diaphoresis.  Denies chest discomfort during these episodes.  ECG showing T wave inversions in the inferolateral leads.  No ST changes.  Initial troponin 424.  Age-adjusted D-dimer negative. Will obtain CT chest and reach out to Cardiology.     10:16 AM  The case was discussed with cardiology.  Recommending transfer to a cardiac catheterization capable facility.  Will start heparin gtt. ASA administered. The patient was offered transfer to either [facility] or [facility].  Patient requesting transfer to [facility] if possible.    12:04 PM  The case was discussed with the cardiac cath lab CRNP, SLIM provider. Will load with Brilinta, obtain stat echo. Delta troponin in flat. Will administer a dose of IV Rocephin for tx of multifocal PNA. Transfer accepted by Dr. Park (ROSLYN).     9:26 PM  Case was signed out to Dr. Khan.  Plan discussed.  Awaiting transport to [facility].        Problems Addressed:  NSTEMI (non-ST elevated myocardial infarction) (HCC): acute illness or injury    Amount and/or Complexity of Data Reviewed  Labs:  ordered.  Radiology: ordered and independent interpretation performed.     Details: Chest x-ray with signs of right-sided pneumonia.  ECG/medicine tests: ordered and independent interpretation performed.     Details: ECG interpreted by me.  Normal sinus rhythm.  Heart rate 70 bpm.  Normal axis.  T wave inversions noted in the inferolateral leads. No ST changes.     Risk  OTC drugs.  Prescription drug management.             Medications   heparin (porcine) 25,000 units in 0.45% NaCl 250 mL infusion (premix) (16 Units/kg/hr × 45 kg (Order-Specific) Intravenous Rate/Dose Change 2/10/25 1734)   aspirin chewable tablet 324 mg (324 mg Oral Given 2/10/25 1007)   ticagrelor (BRILINTA) tablet 180 mg (180 mg Oral Given 2/10/25 1120)   cefTRIAXone (ROCEPHIN) IVPB (premix in dextrose) 1,000 mg 50 mL (0 mg Intravenous Stopped 2/10/25 1311)   multi-electrolyte (ISOLYTE-S PH 7.4) bolus 1,000 mL (0 mL Intravenous Stopped 2/10/25 1632)   ondansetron (ZOFRAN) injection 4 mg (4 mg Intravenous Given 2/10/25 1631)       ED Risk Strat Scores                                              History of Present Illness       Chief Complaint   Patient presents with    Palpitations     Patient reports rapid heart rate, nausea, sweating beginning this morning. States that this is the third time this happened. Patient continues with cough from recent pneumonia.        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    iris nevus tumor removed      Past Surgical History:   Procedure Laterality Date    BREAST EXCISIONAL BIOPSY Left 1975    age 19; benign lesion    BREAST EXCISIONAL BIOPSY Left 01/04/2000    atypical hyperplasia    BREAST EXCISIONAL BIOPSY Left 01/30/2002    atypical intraductal hyperplasia    BREAST EXCISIONAL BIOPSY Left 04/17/2002    intraductal epithelial hyperplasia  and papillomatosis with focal atypia    BREAST EXCISIONAL BIOPSY Left 02/25/2003    foci of atypical intraductal hyperplasia    BREAST EXCISIONAL BIOPSY Left 06/05/2006    fibrocystic change/fibrous mastopathy    BREAST EXCISIONAL BIOPSY Left 02/25/2008    fibrocystic changes    DILATION AND CURETTAGE OF UTERUS      EYE SURGERY Left 12/21/2006    HYSTERECTOMY  02/2000    OOPHORECTOMY Bilateral 2013    approximately    REFRACTIVE SURGERY      US GUIDANCE BREAST BIOPSY LEFT EACH ADDITIONAL Left 04/28/2008    benign breast tissue with stromal fibrosis and sclerosing adenosis    US GUIDED BREAST BIOPSY LEFT COMPLETE Left 04/28/2008    benign breast tissue with stromal fibrosis and sclerosing adenosis      Family History   Problem Relation Age of Onset    Alzheimer's disease Mother     No Known Problems Father     Breast cancer Sister 42    No Known Problems Sister     No Known Problems Sister     No Known Problems Daughter     Breast cancer Maternal Grandmother 38    No Known Problems Maternal Grandfather     No Known Problems Paternal Grandmother     No Known Problems Paternal Grandfather     No Known Problems Maternal Aunt     No Known Problems Maternal Aunt     Breast cancer Paternal Aunt         82    Breast cancer Cousin 44    Breast cancer Cousin 45    Breast cancer additional onset Neg Hx     BRCA2 Positive Neg Hx     BRCA2 Negative Neg Hx     BRCA1 Positive Neg Hx     BRCA1 Negative Neg Hx     BRCA 1/2 Neg Hx     Colon cancer Neg Hx     Ovarian cancer Neg Hx     Endometrial cancer Neg Hx       Social History     Tobacco Use    Smoking status: Every Day     Current packs/day: 0.25     Average packs/day: 0.3 packs/day for 25.0 years (6.3 ttl pk-yrs)     Types: Cigarettes    Smokeless tobacco: Never   Vaping Use    Vaping status: Every Day   Substance Use Topics    Alcohol use: Yes     Alcohol/week: 3.0 standard drinks of alcohol     Types: 3 Glasses of wine per week     Comment: socially    Drug use: Not Currently       E-Cigarette/Vaping    E-Cigarette Use Current Every Day User       E-Cigarette/Vaping Substances      I have reviewed and agree with the history as documented.     Patient presents with intermittent palpitations. Recently completed treatment for pneumonia. Hx of tobacco abuse (smoker), HTN. Has been having intermittent palpitations x 3 weeks. During these episodes, the patient describes fast palpitations with associated diaphoresis, nausea.       History provided by:  Patient and medical records  Palpitations  Palpitations quality:  Fast  Onset quality:  Sudden  Duration:  20 minutes  Timing:  Intermittent  Progression:  Worsening  Chronicity:  New  Associated symptoms: cough, dizziness, malaise/fatigue, nausea, near-syncope and vomiting    Associated symptoms: no back pain, no chest pain, no chest pressure, no leg pain, no lower extremity edema, no numbness, no shortness of breath, no syncope and no weakness      Review of Systems   Constitutional:  Positive for malaise/fatigue. Negative for activity change, appetite change, fatigue and fever.   HENT:  Negative for congestion, rhinorrhea, sinus pressure and sinus pain.    Respiratory:  Positive for cough and wheezing. Negative for chest tightness and shortness of breath.    Cardiovascular:  Positive for near-syncope. Negative for chest pain and syncope.   Gastrointestinal:  Positive for nausea and vomiting. Negative for abdominal pain and diarrhea.   Musculoskeletal:  Negative for arthralgias, back pain, myalgias, neck pain and neck stiffness.   Neurological:  Positive for dizziness and light-headedness. Negative for syncope, weakness and numbness.   All other systems reviewed and are negative.    Objective       ED Triage Vitals   Temperature Pulse Blood Pressure Respirations SpO2 Patient Position - Orthostatic VS   02/10/25 0907 02/10/25 0858 02/10/25 0858 02/10/25 0858 02/10/25 0858 02/10/25 0858   98.1 °F (36.7 °C) 76 (!) 193/85 20 91 % Lying      Temp  Source Heart Rate Source BP Location FiO2 (%) Pain Score    02/10/25 0858 02/10/25 0858 02/10/25 0858 -- 02/10/25 0858    Temporal Monitor Left arm  No Pain      Vitals      Date and Time Temp Pulse SpO2 Resp BP Pain Score FACES Pain Rating User   02/10/25 1930 -- 77 92 % 22 143/65 -- -- TH   02/10/25 1900 -- 72 91 % 22 126/64 -- -- AS   02/10/25 1800 -- 77 96 % 22 141/55 -- -- AS   02/10/25 1700 -- 69 97 % 22 147/66 -- -- AS   02/10/25 1630 -- 68 97 % 22 118/65 -- -- AS   02/10/25 1600 -- 80 97 % 22 147/65 -- -- AS   02/10/25 1515 -- 68 98 % 20 178/76 -- --    02/10/25 1400 -- 67 96 % 21 150/66 -- -- AS   02/10/25 1306 -- 62 96 % 21 183/86 -- -- AS   02/10/25 1239 -- 80 -- -- 195/85 -- -- AEW   02/10/25 1230 -- 71 96 % 21 160/72 -- -- AS   02/10/25 1130 -- 71 95 % 21 127/61 -- -- AS   02/10/25 1100 -- 72 96 % 20 143/63 -- -- AS   02/10/25 1000 -- 73 96 % 20 134/62 -- -- AS   02/10/25 0907 98.1 °F (36.7 °C) -- -- -- -- -- -- AS   02/10/25 0858 -- 76 91 % 20 193/85 No Pain -- SBE            Physical Exam  Vitals and nursing note reviewed.   Constitutional:       General: She is not in acute distress.     Appearance: She is not ill-appearing or toxic-appearing.   HENT:      Head: Normocephalic and atraumatic.      Right Ear: External ear normal.      Left Ear: External ear normal.      Nose: No congestion or rhinorrhea.   Eyes:      General: No scleral icterus.        Right eye: No discharge.         Left eye: No discharge.      Extraocular Movements: Extraocular movements intact.      Conjunctiva/sclera: Conjunctivae normal.   Cardiovascular:      Rate and Rhythm: Normal rate and regular rhythm.      Pulses: Normal pulses.      Heart sounds: Normal heart sounds. No murmur heard.  Pulmonary:      Effort: Pulmonary effort is normal. No respiratory distress.      Breath sounds: Normal breath sounds. No stridor. No wheezing, rhonchi or rales.   Chest:      Chest wall: No tenderness.   Abdominal:      General: Bowel  sounds are normal. There is no distension.      Palpations: Abdomen is soft.      Tenderness: There is no abdominal tenderness. There is no guarding or rebound.   Musculoskeletal:      Right lower leg: No edema.      Left lower leg: No edema.   Skin:     General: Skin is warm and dry.      Coloration: Skin is not jaundiced or pale.   Neurological:      General: No focal deficit present.      Mental Status: She is alert and oriented to person, place, and time.   Psychiatric:         Mood and Affect: Mood normal.         Behavior: Behavior normal.         Results Reviewed       Procedure Component Value Units Date/Time    APTT [[id number]]     Lab Status: No result Specimen: Blood     APTT six (6) hours after Heparin bolus/drip initiation or dosing change [[id number]]  (Normal) Collected: 02/10/25 1631    Lab Status: Final result Specimen: Blood from Arm, Left Updated: 02/10/25 1650     PTT 29 seconds     HS Troponin I 4hr [[id number]]  (Abnormal) Collected: 02/10/25 1248    Lab Status: Final result Specimen: Blood from Arm, Left Updated: 02/10/25 1330     hs TnI 4hr 408 ng/L      Delta 4hr hsTnI -16 ng/L     Procalcitonin [[id number]]  (Normal) Collected: 02/10/25 1051    Lab Status: Final result Specimen: Blood from Arm, Left Updated: 02/10/25 1234     Procalcitonin <0.05 ng/ml     HS Troponin I 2hr [[id number]]  (Abnormal) Collected: 02/10/25 1051    Lab Status: Final result Specimen: Blood from Arm, Left Updated: 02/10/25 1119     hs TnI 2hr 427 ng/L      Delta 2hr hsTnI 3 ng/L     TSH, 3rd generation with Free T4 reflex [[id number]]  (Normal) Collected: 02/10/25 0904    Lab Status: Final result Specimen: Blood from Arm, Right Updated: 02/10/25 1047     TSH 3RD GENERATON 1.449 uIU/mL     Magnesium [[id number]]  (Normal) Collected: 02/10/25 0904    Lab Status: Final result Specimen: Blood from Arm, Right Updated: 02/10/25 1047     Magnesium 2.0 mg/dL     APTT [[id number]]  (Normal) Collected: 02/10/25 0904    Lab Status:  Final result Specimen: Blood from Arm, Right Updated: 02/10/25 1031     PTT 24 seconds     Protime-INR [[id number]]  (Normal) Collected: 02/10/25 0904    Lab Status: Final result Specimen: Blood from Arm, Right Updated: 02/10/25 1031     Protime 12.8 seconds      INR 0.92    Narrative:      INR Therapeutic Range    Indication                                             INR Range      Atrial Fibrillation                                               2.0-3.0  Hypercoagulable State                                    2.0.2.3  Left Ventricular Asist Device                            2.0-3.0  Mechanical Heart Valve                                  -    Aortic(with afib, MI, embolism, HF, LA enlargement,    and/or coagulopathy)                                     2.0-3.0 (2.5-3.5)     Mitral                                                             2.5-3.5  Prosthetic/Bioprosthetic Heart Valve               2.0-3.0  Venous thromboembolism (VTE: VT, PE        2.0-3.0    D-dimer, quantitative [[id number]]  (Abnormal) Collected: 02/10/25 0904    Lab Status: Final result Specimen: Blood from Arm, Right Updated: 02/10/25 1000     D-Dimer, Quant 0.63 ug/ml FEU     Narrative:      In the evaluation for possible pulmonary embolism, in the appropriate (Well's Score of 4 or less) patient, the age adjusted d-dimer cutoff for this patient can be calculated as:    Age x 0.01 (in ug/mL) for Age-adjusted D-dimer exclusion threshold for a patient over 50 years.    HS Troponin 0hr (reflex protocol) [[id number]]  (Abnormal) Collected: 02/10/25 0904    Lab Status: Final result Specimen: Blood from Arm, Right Updated: 02/10/25 0935     hs TnI 0hr 424 ng/L     Comprehensive metabolic panel [[id number]]  (Abnormal) Collected: 02/10/25 0904    Lab Status: Final result Specimen: Blood from Arm, Right Updated: 02/10/25 0931     Sodium 139 mmol/L      Potassium 4.0 mmol/L      Chloride 103 mmol/L      CO2 28 mmol/L      ANION GAP 8 mmol/L      BUN 8  mg/dL      Creatinine 0.41 mg/dL      Glucose 92 mg/dL      Calcium 8.7 mg/dL      AST 32 U/L      ALT 14 U/L      Alkaline Phosphatase 69 U/L      Total Protein 6.6 g/dL      Albumin 3.8 g/dL      Total Bilirubin 0.56 mg/dL      eGFR 106 ml/min/1.73sq m     Narrative:      National Kidney Disease Foundation guidelines for Chronic Kidney Disease (CKD):     Stage 1 with normal or high GFR (GFR > 90 mL/min/1.73 square meters)    Stage 2 Mild CKD (GFR = 60-89 mL/min/1.73 square meters)    Stage 3A Moderate CKD (GFR = 45-59 mL/min/1.73 square meters)    Stage 3B Moderate CKD (GFR = 30-44 mL/min/1.73 square meters)    Stage 4 Severe CKD (GFR = 15-29 mL/min/1.73 square meters)    Stage 5 End Stage CKD (GFR <15 mL/min/1.73 square meters)  Note: GFR calculation is accurate only with a steady state creatinine    CBC and differential [[id number]]  (Abnormal) Collected: 02/10/25 0904    Lab Status: Final result Specimen: Blood from Arm, Right Updated: 02/10/25 0909     WBC 9.83 Thousand/uL      RBC 5.05 Million/uL      Hemoglobin 16.4 g/dL      Hematocrit 50.3 %       fL      MCH 32.5 pg      MCHC 32.6 g/dL      RDW 12.6 %      MPV 9.4 fL      Platelets 244 Thousands/uL      nRBC 0 /100 WBCs      Segmented % 82 %      Immature Grans % 1 %      Lymphocytes % 8 %      Monocytes % 6 %      Eosinophils Relative 2 %      Basophils Relative 1 %      Absolute Neutrophils 8.02 Thousands/µL      Absolute Immature Grans 0.06 Thousand/uL      Absolute Lymphocytes 0.83 Thousands/µL      Absolute Monocytes 0.63 Thousand/µL      Eosinophils Absolute 0.24 Thousand/µL      Basophils Absolute 0.05 Thousands/µL             CT chest without contrast   Final Interpretation by Herberth Dawson MD (02/10 1102)      1.  Bilateral airspace opacities suspicious for multifocal pneumonia. Recommend follow-up chest CT in 2 to 3 months to confirm resolution.   2.  Mediastinal lymphadenopathy including 2.3 cm subcarinal lymph node. This may be reactive  to the above. Attention on recommended follow-up.         The study was marked in EPIC for immediate notification.      Workstation performed: [id number]         XR chest 1 view portable   Final Interpretation by Barron Gray MD (02/10 0919)      Right lung opacities potentially representing pneumonia.            Workstation performed: [id number]             CriticalCare Time    Date/Time: 2/10/2025 1:04 PM    Performed by: Kodak Bucio DO  Authorized by: Kodak Bucio DO    Critical care provider statement:     Critical care time (minutes):  45    Critical care time was exclusive of:  Separately billable procedures and treating other patients    Critical care was necessary to treat or prevent imminent or life-threatening deterioration of the following conditions:  Cardiac failure and circulatory failure    Critical care was time spent personally by me on the following activities:  Blood draw for specimens, obtaining history from patient or surrogate, development of treatment plan with patient or surrogate, discussions with consultants, evaluation of patient's response to treatment, examination of patient, review of old charts, re-evaluation of patient's condition, ordering and review of radiographic studies, ordering and review of laboratory studies and ordering and performing treatments and interventions      ED Medication and Procedure Management   Prior to Admission Medications   Prescriptions Last Dose Informant Patient Reported? Taking?   EPINEPHrine (EPIPEN) 0.3 mg/0.3 mL SOAJ   Yes Yes   Sig: Inject as directed   albuterol (Proventil HFA) 90 mcg/act inhaler   No Yes   Sig: Inhale 2 puffs every 6 (six) hours as needed for wheezing   amLODIPine (NORVASC) 10 mg tablet   No Yes   Sig: Take 1 tablet (10 mg total) by mouth daily   brimonidine-timolol (COMBIGAN) 0.2-0.5 %   Yes Yes   Sig: Apply to eye   carvedilol (COREG) 6.25 mg tablet   Yes Yes   Sig: Take 6.25 mg by mouth 2 (two) times a day    prednisoLONE acetate (PRED MILD) 0.12 % ophthalmic suspension   Yes Yes   Si drop 4 (four) times a day      Facility-Administered Medications: None     Patient's Medications   Discharge Prescriptions    No medications on file     No discharge procedures on file.  ED SEPSIS DOCUMENTATION   Time reflects when diagnosis was documented in both MDM as applicable and the Disposition within this note       Time User Action Codes Description Comment    2/10/2025 11:02 AM Kodak Bucio Add [I21.4] NSTEMI (non-ST elevated myocardial infarction) (HCC)                  Kodak Bucio DO  02/10/25 2127

## 2025-02-10 NOTE — ED NOTES
Patient ambulated to the bathroom, escorted by ED technician. Upon return to room, patient reports severe dizziness with onset of nausea and palpitations. Vitals completed at this time.      Allison A Schoener, RN  02/10/25 6395

## 2025-02-11 PROBLEM — E78.2 MIXED HYPERLIPIDEMIA: Status: ACTIVE | Noted: 2025-02-11

## 2025-02-11 PROBLEM — R91.8 GROUND GLASS OPACITY PRESENT ON IMAGING OF LUNG: Status: ACTIVE | Noted: 2025-02-10

## 2025-02-11 PROBLEM — R59.0 MEDIASTINAL LYMPHADENOPATHY: Status: ACTIVE | Noted: 2025-02-11

## 2025-02-11 PROBLEM — F17.290 VAPING NICOTINE DEPENDENCE, TOBACCO PRODUCT: Status: ACTIVE | Noted: 2025-02-10

## 2025-02-11 PROBLEM — Z87.01 HISTORY OF RECENT PNEUMONIA: Status: ACTIVE | Noted: 2025-02-10

## 2025-02-11 LAB
ANION GAP SERPL CALCULATED.3IONS-SCNC: 10 MMOL/L (ref 4–13)
APTT PPP: 35 SECONDS (ref 23–34)
APTT PPP: 55 SECONDS (ref 23–34)
BUN SERPL-MCNC: 5 MG/DL (ref 5–25)
CALCIUM SERPL-MCNC: 8.9 MG/DL (ref 8.4–10.2)
CHLORIDE SERPL-SCNC: 103 MMOL/L (ref 96–108)
CHOLEST SERPL-MCNC: 183 MG/DL (ref ?–200)
CHOLEST SERPL-MCNC: 201 MG/DL (ref ?–200)
CO2 SERPL-SCNC: 25 MMOL/L (ref 21–32)
CREAT SERPL-MCNC: 0.37 MG/DL (ref 0.6–1.3)
ERYTHROCYTE [DISTWIDTH] IN BLOOD BY AUTOMATED COUNT: 12.5 % (ref 11.6–15.1)
EST. AVERAGE GLUCOSE BLD GHB EST-MCNC: 114 MG/DL
GFR SERPL CREATININE-BSD FRML MDRD: 110 ML/MIN/1.73SQ M
GLUCOSE SERPL-MCNC: 80 MG/DL (ref 65–140)
HBA1C MFR BLD: 5.6 %
HCT VFR BLD AUTO: 46.6 % (ref 34.8–46.1)
HDLC SERPL-MCNC: 82 MG/DL
HDLC SERPL-MCNC: 93 MG/DL
HGB BLD-MCNC: 15.6 G/DL (ref 11.5–15.4)
LDLC SERPL CALC-MCNC: 79 MG/DL (ref 0–100)
LDLC SERPL CALC-MCNC: 92 MG/DL (ref 0–100)
MCH RBC QN AUTO: 32.5 PG (ref 26.8–34.3)
MCHC RBC AUTO-ENTMCNC: 33.5 G/DL (ref 31.4–37.4)
MCV RBC AUTO: 97 FL (ref 82–98)
PLATELET # BLD AUTO: 229 THOUSANDS/UL (ref 149–390)
PMV BLD AUTO: 9.7 FL (ref 8.9–12.7)
POTASSIUM SERPL-SCNC: 3.3 MMOL/L (ref 3.5–5.3)
RBC # BLD AUTO: 4.8 MILLION/UL (ref 3.81–5.12)
SODIUM SERPL-SCNC: 138 MMOL/L (ref 135–147)
TRIGL SERPL-MCNC: 112 MG/DL (ref ?–150)
TRIGL SERPL-MCNC: 80 MG/DL (ref ?–150)
WBC # BLD AUTO: 7.99 THOUSAND/UL (ref 4.31–10.16)

## 2025-02-11 PROCEDURE — C1894 INTRO/SHEATH, NON-LASER: HCPCS | Performed by: INTERNAL MEDICINE

## 2025-02-11 PROCEDURE — 85027 COMPLETE CBC AUTOMATED: CPT | Performed by: INTERNAL MEDICINE

## 2025-02-11 PROCEDURE — 99232 SBSQ HOSP IP/OBS MODERATE 35: CPT | Performed by: INTERNAL MEDICINE

## 2025-02-11 PROCEDURE — 99222 1ST HOSP IP/OBS MODERATE 55: CPT | Performed by: STUDENT IN AN ORGANIZED HEALTH CARE EDUCATION/TRAINING PROGRAM

## 2025-02-11 PROCEDURE — 83036 HEMOGLOBIN GLYCOSYLATED A1C: CPT | Performed by: INTERNAL MEDICINE

## 2025-02-11 PROCEDURE — 4A023N7 MEASUREMENT OF CARDIAC SAMPLING AND PRESSURE, LEFT HEART, PERCUTANEOUS APPROACH: ICD-10-PCS | Performed by: INTERNAL MEDICINE

## 2025-02-11 PROCEDURE — 99152 MOD SED SAME PHYS/QHP 5/>YRS: CPT | Performed by: INTERNAL MEDICINE

## 2025-02-11 PROCEDURE — C1769 GUIDE WIRE: HCPCS | Performed by: INTERNAL MEDICINE

## 2025-02-11 PROCEDURE — 85730 THROMBOPLASTIN TIME PARTIAL: CPT

## 2025-02-11 PROCEDURE — 93458 L HRT ARTERY/VENTRICLE ANGIO: CPT | Performed by: INTERNAL MEDICINE

## 2025-02-11 PROCEDURE — B2111ZZ FLUOROSCOPY OF MULTIPLE CORONARY ARTERIES USING LOW OSMOLAR CONTRAST: ICD-10-PCS | Performed by: INTERNAL MEDICINE

## 2025-02-11 PROCEDURE — 99153 MOD SED SAME PHYS/QHP EA: CPT | Performed by: INTERNAL MEDICINE

## 2025-02-11 PROCEDURE — 85730 THROMBOPLASTIN TIME PARTIAL: CPT | Performed by: INTERNAL MEDICINE

## 2025-02-11 PROCEDURE — 80048 BASIC METABOLIC PNL TOTAL CA: CPT | Performed by: INTERNAL MEDICINE

## 2025-02-11 PROCEDURE — 80061 LIPID PANEL: CPT | Performed by: INTERNAL MEDICINE

## 2025-02-11 RX ORDER — NITROGLYCERIN 20 MG/100ML
INJECTION INTRAVENOUS CODE/TRAUMA/SEDATION MEDICATION
Status: DISCONTINUED | OUTPATIENT
Start: 2025-02-11 | End: 2025-02-11 | Stop reason: HOSPADM

## 2025-02-11 RX ORDER — HEPARIN SODIUM 1000 [USP'U]/ML
INJECTION, SOLUTION INTRAVENOUS; SUBCUTANEOUS CODE/TRAUMA/SEDATION MEDICATION
Status: DISCONTINUED | OUTPATIENT
Start: 2025-02-11 | End: 2025-02-11 | Stop reason: HOSPADM

## 2025-02-11 RX ORDER — ASPIRIN 81 MG/1
244 TABLET, CHEWABLE ORAL DAILY
Status: DISCONTINUED | OUTPATIENT
Start: 2025-02-11 | End: 2025-02-11

## 2025-02-11 RX ORDER — ATORVASTATIN CALCIUM 80 MG/1
80 TABLET, FILM COATED ORAL
Status: DISCONTINUED | OUTPATIENT
Start: 2025-02-11 | End: 2025-02-13 | Stop reason: HOSPADM

## 2025-02-11 RX ORDER — MIDAZOLAM HYDROCHLORIDE 2 MG/2ML
INJECTION, SOLUTION INTRAMUSCULAR; INTRAVENOUS CODE/TRAUMA/SEDATION MEDICATION
Status: DISCONTINUED | OUTPATIENT
Start: 2025-02-11 | End: 2025-02-11 | Stop reason: HOSPADM

## 2025-02-11 RX ORDER — AMLODIPINE BESYLATE 10 MG/1
10 TABLET ORAL DAILY
Status: DISCONTINUED | OUTPATIENT
Start: 2025-02-12 | End: 2025-02-12

## 2025-02-11 RX ORDER — ASPIRIN 81 MG/1
324 TABLET, CHEWABLE ORAL ONCE
Status: DISCONTINUED | OUTPATIENT
Start: 2025-02-11 | End: 2025-02-11

## 2025-02-11 RX ORDER — LIDOCAINE HYDROCHLORIDE 10 MG/ML
INJECTION, SOLUTION EPIDURAL; INFILTRATION; INTRACAUDAL; PERINEURAL CODE/TRAUMA/SEDATION MEDICATION
Status: DISCONTINUED | OUTPATIENT
Start: 2025-02-11 | End: 2025-02-11 | Stop reason: HOSPADM

## 2025-02-11 RX ORDER — ASPIRIN 81 MG/1
162 TABLET, CHEWABLE ORAL DAILY
Status: DISCONTINUED | OUTPATIENT
Start: 2025-02-11 | End: 2025-02-11

## 2025-02-11 RX ORDER — SODIUM CHLORIDE 9 MG/ML
125 INJECTION, SOLUTION INTRAVENOUS CONTINUOUS
Status: DISCONTINUED | OUTPATIENT
Start: 2025-02-11 | End: 2025-02-11

## 2025-02-11 RX ORDER — FENTANYL CITRATE 50 UG/ML
INJECTION, SOLUTION INTRAMUSCULAR; INTRAVENOUS CODE/TRAUMA/SEDATION MEDICATION
Status: DISCONTINUED | OUTPATIENT
Start: 2025-02-11 | End: 2025-02-11 | Stop reason: HOSPADM

## 2025-02-11 RX ORDER — SODIUM CHLORIDE 9 MG/ML
100 INJECTION, SOLUTION INTRAVENOUS CONTINUOUS
Status: DISPENSED | OUTPATIENT
Start: 2025-02-11 | End: 2025-02-11

## 2025-02-11 RX ORDER — VERAPAMIL HYDROCHLORIDE 2.5 MG/ML
INJECTION, SOLUTION INTRAVENOUS CODE/TRAUMA/SEDATION MEDICATION
Status: DISCONTINUED | OUTPATIENT
Start: 2025-02-11 | End: 2025-02-11 | Stop reason: HOSPADM

## 2025-02-11 RX ADMIN — TICAGRELOR 90 MG: 90 TABLET ORAL at 08:36

## 2025-02-11 RX ADMIN — SODIUM CHLORIDE 125 ML/HR: 0.9 INJECTION, SOLUTION INTRAVENOUS at 09:14

## 2025-02-11 RX ADMIN — ASPIRIN 81 MG CHEWABLE TABLET 81 MG: 81 TABLET CHEWABLE at 08:36

## 2025-02-11 RX ADMIN — GUAIFENESIN 200 MG: 200 SOLUTION ORAL at 01:42

## 2025-02-11 RX ADMIN — ATORVASTATIN CALCIUM 80 MG: 80 TABLET, FILM COATED ORAL at 17:34

## 2025-02-11 RX ADMIN — GUAIFENESIN 200 MG: 200 SOLUTION ORAL at 21:59

## 2025-02-11 RX ADMIN — CARVEDILOL 6.25 MG: 6.25 TABLET, FILM COATED ORAL at 08:36

## 2025-02-11 RX ADMIN — METOPROLOL TARTRATE 25 MG: 25 TABLET, FILM COATED ORAL at 08:36

## 2025-02-11 RX ADMIN — ASPIRIN 81 MG CHEWABLE TABLET 243 MG: 81 TABLET CHEWABLE at 09:21

## 2025-02-11 RX ADMIN — SODIUM CHLORIDE 100 ML/HR: 0.9 INJECTION, SOLUTION INTRAVENOUS at 11:34

## 2025-02-11 RX ADMIN — HEPARIN SODIUM 2700 UNITS: 1000 INJECTION, SOLUTION INTRAVENOUS; SUBCUTANEOUS at 01:35

## 2025-02-11 RX ADMIN — HEPARIN SODIUM 16 UNITS/KG/HR: 10000 INJECTION, SOLUTION INTRAVENOUS at 00:08

## 2025-02-11 RX ADMIN — METOPROLOL TARTRATE 25 MG: 25 TABLET, FILM COATED ORAL at 21:52

## 2025-02-11 NOTE — H&P
H&P - Hospitalist   Name: Corie Pal 69 y.o. female I MRN: [id number]  Unit/Bed#: E4 -01 I Date of Admission: 2/10/2025   Date of Service: 2/10/2025 I Hospital Day: 0     Assessment & Plan  ACS (acute coronary syndrome) (HCC)  Presents with palpitations, nausea, diaphoresis over past few weeks, worsening since this AM.   hsTrop [id number]  EKG NSR HR 70 inferolateral t wave inversions   TTE LVEF 65%, basal inferolateral wall hypo/akinetic, mild/mod MR, mild TR  Concerning for ACS/NSTEMI    Plan:  Transfer to cardiac catheterization capable facility  Continue ASA, Brilinta, statin, heparin gtt  Start metoprolol 25mg BID  Maintain telemetry  NPO at midnight  Cardiology consult   Essential hypertension  Elevated   Outpatient regimen: amlodipine 10mg daily, ?carvedilol      Plan:  Hold amlodipine to titrate beta-blocker  Elevated troponin  In setting of above, see plans above  History of recent pneumonia  Beginning Nov/Dec of last year, began with suspected sinus infection that progressed to pneumonia. Completed courses of Augmentin, doxycycline and Levaquin outpatient. Remains with persistent cough  WBC, procal wnl  Low suspicion for acute infection    Plan:  Monitor off antibiotics  Respiratory hygiene, flutter, antitussives/expectorants   Vaping nicotine dependence, tobacco product  Noted, cessation counseling, nicotine replacement PRN    VTE Pharmacologic Prophylaxis: VTE Score: 4 Moderate Risk (Score 3-4) - Pharmacological DVT Prophylaxis Ordered: heparin drip.  Code Status: Level 1 - Full Code   Discussion with family: Updated  () at bedside.    Anticipated Length of Stay: Patient will be admitted on an inpatient basis with an anticipated length of stay of greater than 2 midnights secondary to acs.    History of Present Illness   Chief Complaint: No chief complaint on file.    Corie Pal is a 69 y.o. female with a PMH of HTN, tobacco use who presents with  palpitations.   History obtained from patient, family and chart review. She presents tonight to [facility] for evaluation of palpitations, sweating, nausea. This has been progressively worsening over the past few weeks with worst episode this morning. Her symptoms were so severe she could not get out of bathroom. This began a few weeks prior after being diagnosed with pneumonia. She was having intermittent episodes of coughing fits, palpitations, sweating and just generally felt unwell. She took 3 different courses of antibiotics. Her cough persisted and sometimes prompts these symptomatic episodes she experiences. Unknown if having fevers at home. Exertion seemed to make her symptoms worse. Nothing seemed to improve symptoms. Reports compliance with prescribed medications.     Review of Systems   Constitutional:  Negative for chills and fever.   Respiratory:  Positive for cough. Negative for shortness of breath.    Cardiovascular:  Positive for palpitations. Negative for chest pain and leg swelling.   Gastrointestinal:  Negative for abdominal pain, diarrhea, nausea and vomiting.   Neurological:  Negative for syncope.   All other systems reviewed and are negative.      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    iris nevus tumor removed     Past Surgical History:   Procedure Laterality Date    BREAST EXCISIONAL BIOPSY Left 1975    age 19; benign lesion    BREAST EXCISIONAL BIOPSY Left 01/04/2000    atypical hyperplasia    BREAST EXCISIONAL BIOPSY Left 01/30/2002    atypical intraductal hyperplasia    BREAST EXCISIONAL BIOPSY Left 04/17/2002    intraductal epithelial hyperplasia and papillomatosis with focal atypia    BREAST EXCISIONAL BIOPSY Left 02/25/2003     foci of atypical intraductal hyperplasia    BREAST EXCISIONAL BIOPSY Left 06/05/2006    fibrocystic change/fibrous mastopathy    BREAST EXCISIONAL BIOPSY Left 02/25/2008    fibrocystic changes    DILATION AND CURETTAGE OF UTERUS      EYE SURGERY Left 12/21/2006    HYSTERECTOMY  02/2000    OOPHORECTOMY Bilateral 2013    approximately    REFRACTIVE SURGERY      US GUIDANCE BREAST BIOPSY LEFT EACH ADDITIONAL Left 04/28/2008    benign breast tissue with stromal fibrosis and sclerosing adenosis    US GUIDED BREAST BIOPSY LEFT COMPLETE Left 04/28/2008    benign breast tissue with stromal fibrosis and sclerosing adenosis     Social History     Tobacco Use    Smoking status: Every Day     Current packs/day: 0.25     Average packs/day: 0.3 packs/day for 25.0 years (6.3 ttl pk-yrs)     Types: Cigarettes    Smokeless tobacco: Never   Vaping Use    Vaping status: Every Day   Substance and Sexual Activity    Alcohol use: Yes     Alcohol/week: 3.0 standard drinks of alcohol     Types: 3 Glasses of wine per week     Comment: socially    Drug use: Not Currently    Sexual activity: Not on file     E-Cigarette/Vaping    E-Cigarette Use Current Every Day User      E-Cigarette/Vaping Substances     Family History   Problem Relation Age of Onset    Alzheimer's disease Mother     No Known Problems Father     Breast cancer Sister 42    No Known Problems Sister     No Known Problems Sister     No Known Problems Daughter     Breast cancer Maternal Grandmother 38    No Known Problems Maternal Grandfather     No Known Problems Paternal Grandmother     No Known Problems Paternal Grandfather     No Known Problems Maternal Aunt     No Known Problems Maternal Aunt     Breast cancer Paternal Aunt         82    Breast cancer Cousin 44    Breast cancer Cousin 45    Breast cancer additional onset Neg Hx     BRCA2 Positive Neg Hx     BRCA2 Negative Neg Hx     BRCA1 Positive Neg Hx     BRCA1 Negative Neg Hx     BRCA 1/2 Neg Hx     Colon cancer Neg Hx      Ovarian cancer Neg Hx     Endometrial cancer Neg Hx      Social History:  Marital Status: /Civil Union   Occupation:   Patient Pre-hospital Living Situation: Home  Patient Pre-hospital Level of Mobility: walks  Patient Pre-hospital Diet Restrictions:    Meds/Allergies   I have reviewed home medications using recent Epic encounter.  Prior to Admission medications    Medication Sig Start Date End Date Taking? Authorizing Provider   albuterol (Proventil HFA) 90 mcg/act inhaler Inhale 2 puffs every 6 (six) hours as needed for wheezing 1/14/25   Gabriela Menendez PA-C   amLODIPine (NORVASC) 10 mg tablet Take 1 tablet (10 mg total) by mouth daily 1/28/25   Gabriela Menendez PA-C   brimonidine-timolol (COMBIGAN) 0.2-0.5 % Apply to eye    Historical Provider, MD   carvedilol (COREG) 6.25 mg tablet Take 6.25 mg by mouth 2 (two) times a day 10/3/24   Historical Provider, MD   EPINEPHrine (EPIPEN) 0.3 mg/0.3 mL SOAJ Inject as directed 4/25/11   Historical Provider, MD   prednisoLONE acetate (PRED MILD) 0.12 % ophthalmic suspension 1 drop 4 (four) times a day    Historical Provider, MD     Allergies   Allergen Reactions    Bee Venom Anaphylaxis    Dayquil [Pseudoephedrine-Dm-Gg-Apap] Other (See Comments)       Objective :  Temp:  [98.1 °F (36.7 °C)-98.3 °F (36.8 °C)] 98.3 °F (36.8 °C)  HR:  [62-80] 74  BP: (118-195)/(55-86) 150/69  Resp:  [20-22] 22  SpO2:  [91 %-98 %] 95 %  O2 Device: None (Room air)    Physical Exam  Vitals and nursing note reviewed.   Constitutional:       General: She is not in acute distress.     Appearance: She is well-developed.   HENT:      Head: Normocephalic and atraumatic.   Eyes:      Conjunctiva/sclera: Conjunctivae normal.   Cardiovascular:      Rate and Rhythm: Normal rate and regular rhythm.      Heart sounds: No murmur heard.  Pulmonary:      Effort: Pulmonary effort is normal. No respiratory distress.      Breath sounds: Normal breath sounds.   Abdominal:      Palpations:  "Abdomen is soft.      Tenderness: There is no abdominal tenderness.   Musculoskeletal:         General: No swelling.      Cervical back: Neck supple.   Skin:     General: Skin is warm and dry.      Capillary Refill: Capillary refill takes less than 2 seconds.   Neurological:      Mental Status: She is alert.   Psychiatric:         Mood and Affect: Mood normal.        Lines/Drains:            Lab Results: I have reviewed the following results:  Results from last 7 days   Lab Units 02/10/25  0904   WBC Thousand/uL 9.83   HEMOGLOBIN g/dL 16.4*   HEMATOCRIT % 50.3*   PLATELETS Thousands/uL 244   SEGS PCT % 82*   LYMPHO PCT % 8*   MONO PCT % 6   EOS PCT % 2     Results from last 7 days   Lab Units 02/10/25  0904   SODIUM mmol/L 139   POTASSIUM mmol/L 4.0   CHLORIDE mmol/L 103   CO2 mmol/L 28   BUN mg/dL 8   CREATININE mg/dL 0.41*   ANION GAP mmol/L 8   CALCIUM mg/dL 8.7   ALBUMIN g/dL 3.8   TOTAL BILIRUBIN mg/dL 0.56   ALK PHOS U/L 69   ALT U/L 14   AST U/L 32   GLUCOSE RANDOM mg/dL 92     Results from last 7 days   Lab Units 02/10/25  0904   INR  0.92         No results found for: \"HGBA1C\"  Results from last 7 days   Lab Units 02/10/25  1051   PROCALCITONIN ng/ml <0.05       Imaging Results Review: I reviewed radiology reports from this admission including: chest xray and CT chest.  Other Study Results Review: EKG was personally reviewed and my interpretation is: NSR. HR 70..    Administrative Statements   ** Please Note: This note has been constructed using a voice recognition system. **    "

## 2025-02-11 NOTE — ASSESSMENT & PLAN NOTE
- CT 02/10/25 shows bilateral airspace opacities suspicious for multifocal PNA   - have sinusitis in Nov/Dec of 2024 treated with course of Augmentin, doxycycline, and levoquin  - still has a persistent cough

## 2025-02-11 NOTE — DISCHARGE INSTR - AVS FIRST PAGE
1. Please see the post cardiac catheterization dishcarge instructions.   No heavy lifting, greater than 10 lbs. or strenuous  activity for 48 hrs.    2.Remove band aid tomorrow.  Shower and wash area- wrist gently with soap and water- beginning tomorrow. Rinse and pat dry.  Apply new water seal band aid.  Repeat this process for 5 days. No powders, creams lotions or antibiotic ointments  for 5 days.  No tub baths, hot tubs or swimming for 5 days.     3. Please call our office ([phone]) if you have any fever, redness, swelling, discharge from your wrist access site.    4.No driving for 1 day      Dear Corie Pal,     It was our pleasure to care for you here at [facility].  Notable Medication Adjustments -   Stop your amlodipine  New medications include losartan, aspirin and atorvastatin  You will also take a new inhaler Symbicort every day  You were sent with Flonase for your nose    Testing Required after Discharge -   You will need repeat CT chest imaging in approximately 6 weeks  You will complete a formal Zio patch evaluation with a cardiologist outpatient, this is a heart monitor  You will need formal follow-up and evaluation with a pulmonologist where they may want to perform pulmonary function tests    Please review this entire after visit summary as additional general instructions including medication list, appointments, activity, diet, any pertinent wound care, and other additional recommendations from your care team that may be provided for you.

## 2025-02-11 NOTE — ASSESSMENT & PLAN NOTE
Patient with palpitations, chest discomfort with abnormal wall motion on echocardiogram and elevated troponin consistent with ACS  She was initially seen at  [facility], started on heparin, aspirin and Brilinta and was transferred here for cardiac catheterization  Continue aspirin, Brilinta and heparin.  Further recommendations will depend on the cardiac catheterization results

## 2025-02-11 NOTE — PROGRESS NOTES
Progress Note - Hospitalist   Name: Corie Pal 69 y.o. female I MRN: [id number]  Unit/Bed#: E4 -01 I Date of Admission: 2/10/2025   Date of Service: 2/11/2025 I Hospital Day: 1    Assessment & Plan  ACS (acute coronary syndrome) (HCC)  Patient with palpitations, chest discomfort with abnormal wall motion on echocardiogram and elevated troponin consistent with ACS  She was initially seen at  [facility], started on heparin, aspirin and Brilinta and was transferred here for cardiac catheterization  Continue aspirin, Brilinta and heparin.  Further recommendations will depend on the cardiac catheterization results    Abnormal CT scan of lung  Patient with bilateral infiltrates from recent pneumonia  She recently finished 3 courses of antibiotics for the past few weeks (Augmentin, doxycycline, Levaquin)  She currently has no fever, leukocytosis or elevated procalcitonin  She will need repeat CT in 2 to 3 months to ensure resolution  Continue supportive care.  Emphasized tobacco cessation    Mediastinal lymphadenopathy  Reactive due to recent pneumonia.  Repeat CT in 2 to 3 months per radiology recommendation  Findings placed on AVS  Essential hypertension   discussed with Sana from cardiology   Continue metoprolol 25 mg twice daily    Tobacco use  Continue nicotine patch.  Tobacco cessation emphasized  Mixed hyperlipidemia  Continue Lipitor 80 mg daily    VTE Pharmacologic Prophylaxis: VTE Score: 4 Moderate Risk (Score 3-4) - Pharmacological DVT Prophylaxis Ordered: heparin drip.       Discussions with Specialists or Other Care Team Provider: H&P reviewed.  Discussed with cardiology    Education and Discussions with Family / Patient: Patient declined call to .     Current Length of Stay: 1 day(s)  Current Patient Status: Inpatient   Certification Statement: The patient will continue to require additional inpatient hospital stay due to ACS  Discharge Plan: Anticipate discharge in 24-48 hrs  to home.    Code Status: Level 1 - Full Code    Subjective   Seen and examined this morning during rounds.  Denied chest pain.  She had occasional cough  She admitted that she was still smoking prior to admission    Objective :  Temp:  [97.5 °F (36.4 °C)-98.3 °F (36.8 °C)] 97.5 °F (36.4 °C)  HR:  [62-83] 62  BP: ()/(54-86) 93/57  Resp:  [18-22] 18  SpO2:  [89 %-98 %] 92 %  O2 Device: Nasal cannula  Nasal Cannula O2 Flow Rate (L/min):  [2 L/min] 2 L/min    There is no height or weight on file to calculate BMI.     Input and Output Summary (last 24 hours):   No intake or output data in the 24 hours ending 02/11/25 1150    Physical Exam  Vitals reviewed.   HENT:      Head: Normocephalic and atraumatic.      Nose: No congestion or rhinorrhea.   Eyes:      General: No scleral icterus.  Cardiovascular:      Rate and Rhythm: Normal rate and regular rhythm.   Pulmonary:      Breath sounds: No wheezing or rhonchi.      Comments: Harsh cough  Abdominal:      Palpations: Abdomen is soft.      Tenderness: There is no abdominal tenderness. There is no guarding.   Musculoskeletal:      Right lower leg: No edema.      Left lower leg: No edema.   Skin:     General: Skin is warm and dry.   Neurological:      Mental Status: She is oriented to person, place, and time.   Psychiatric:         Mood and Affect: Mood normal.         Behavior: Behavior normal.                Lab Results: I have reviewed the following results:   Results from last 7 days   Lab Units 02/11/25  0736 02/10/25  0904   WBC Thousand/uL 7.99 9.83   HEMOGLOBIN g/dL 15.6* 16.4*   HEMATOCRIT % 46.6* 50.3*   PLATELETS Thousands/uL 229 244   SEGS PCT %  --  82*   LYMPHO PCT %  --  8*   MONO PCT %  --  6   EOS PCT %  --  2     Results from last 7 days   Lab Units 02/11/25  0736 02/10/25  0904   SODIUM mmol/L 138 139   POTASSIUM mmol/L 3.3* 4.0   CHLORIDE mmol/L 103 103   CO2 mmol/L 25 28   BUN mg/dL 5 8   CREATININE mg/dL 0.37* 0.41*   ANION GAP mmol/L 10 8    CALCIUM mg/dL 8.9 8.7   ALBUMIN g/dL  --  3.8   TOTAL BILIRUBIN mg/dL  --  0.56   ALK PHOS U/L  --  69   ALT U/L  --  14   AST U/L  --  32   GLUCOSE RANDOM mg/dL 80 92     Results from last 7 days   Lab Units 02/10/25  0904   INR  0.92         Results from last 7 days   Lab Units 02/11/25  0736   HEMOGLOBIN A1C % 5.6     Results from last 7 days   Lab Units 02/10/25  1051   PROCALCITONIN ng/ml <0.05       Recent Cultures (last 7 days):         Imaging Results Review: I reviewed radiology reports from this admission including: CT chest.      Last 24 Hours Medication List:     Current Facility-Administered Medications:     acetaminophen (TYLENOL) tablet 650 mg, Q6H PRN    aluminum-magnesium hydroxide-simethicone (MAALOX) oral suspension 30 mL, Q6H PRN    [START ON 2/12/2025] amLODIPine (NORVASC) tablet 10 mg, Daily    aspirin chewable tablet 81 mg, Daily    atorvastatin (LIPITOR) tablet 80 mg, Daily With Dinner    guaiFENesin (ROBITUSSIN) oral liquid 200 mg, Q4H PRN    metoprolol tartrate (LOPRESSOR) tablet 25 mg, Q12H DERIC    nicotine (NICODERM CQ) 14 mg/24hr TD 24 hr patch 1 patch, Daily PRN    polyethylene glycol (MIRALAX) packet 17 g, Daily PRN    sodium chloride 0.9 % infusion, Continuous, Last Rate: 100 mL/hr (02/11/25 1134)    trimethobenzamide (TIGAN) IM injection 200 mg, Q6H PRN    Administrative Statements   Today, Patient Was Seen By: Carlos Duke MD      **Please Note: This note may have been constructed using a voice recognition system.**

## 2025-02-11 NOTE — ASSESSMENT & PLAN NOTE
- lipid panel 08.24/23: cholesterol 232, triglycerides 83, HLD 95,   - was not on a cholesterol lowering med outpatient

## 2025-02-11 NOTE — ASSESSMENT & PLAN NOTE
- BP stable average systolic around 120 mmHg  - home rx: amlodipine 10 mg daily and coreg 6.25 mg BID   Spoke to pt and informed of Sara FAUST note. Pt verbalized understanding.  Case Request entered for 2nd sign.

## 2025-02-11 NOTE — PLAN OF CARE
Problem: PAIN - ADULT  Goal: Verbalizes/displays adequate comfort level or baseline comfort level  Description: Interventions:  - Encourage patient to monitor pain and request assistance  - Assess pain using appropriate pain scale  - Administer analgesics based on type and severity of pain and evaluate response  - Implement non-pharmacological measures as appropriate and evaluate response  - Consider cultural and social influences on pain and pain management  - Notify physician/advanced practitioner if interventions unsuccessful or patient reports new pain  Outcome: Progressing     Problem: INFECTION - ADULT  Goal: Absence or prevention of progression during hospitalization  Description: INTERVENTIONS:  - Assess and monitor for signs and symptoms of infection  - Monitor lab/diagnostic results  - Monitor all insertion sites, i.e. indwelling lines, tubes, and drains  - Monitor endotracheal if appropriate and nasal secretions for changes in amount and color  - [place] appropriate cooling/warming therapies per order  - Administer medications as ordered  - Instruct and encourage patient and family to use good hand hygiene technique  - Identify and instruct in appropriate isolation precautions for identified infection/condition  Outcome: Progressing     Problem: SAFETY ADULT  Goal: Patient will remain free of falls  Description: INTERVENTIONS:  - Educate patient/family on patient safety including physical limitations  - Instruct patient to call for assistance with activity   - Consult OT/PT to assist with strengthening/mobility   - Keep Call bell within reach  - Keep bed low and locked with side rails adjusted as appropriate  - Keep care items and personal belongings within reach  - Initiate and maintain comfort rounds  - Make Fall Risk Sign visible to staff    - Apply yellow socks and bracelet for high fall risk patients  - Consider moving patient to room near nurses station  Outcome: Progressing  Goal: Maintain or  return to baseline ADL function  Description: INTERVENTIONS:  -  Assess patient's ability to carry out ADLs; assess patient's baseline for ADL function and identify physical deficits which impact ability to perform ADLs (bathing, care of mouth/teeth, toileting, grooming, dressing, etc.)  - Assess/evaluate cause of self-care deficits   - Assess range of motion  - Assess patient's mobility; develop plan if impaired  - Assess patient's need for assistive devices and provide as appropriate  - Encourage maximum independence but intervene and supervise when necessary  - Involve family in performance of ADLs  - Assess for home care needs following discharge   - Consider OT consult to assist with ADL evaluation and planning for discharge  - Provide patient education as appropriate  Outcome: Progressing     Problem: DISCHARGE PLANNING  Goal: Discharge to home or other facility with appropriate resources  Description: INTERVENTIONS:  - Identify barriers to discharge w/patient and caregiver  - Arrange for needed discharge resources and transportation as appropriate  - Identify discharge learning needs (meds, wound care, etc.)  - Arrange for interpretive services to assist at discharge as needed  - Refer to Case Management Department for coordinating discharge planning if the patient needs post-hospital services based on physician/advanced practitioner order or complex needs related to functional status, cognitive ability, or social support system  Outcome: Progressing     Problem: Knowledge Deficit  Goal: Patient/family/caregiver demonstrates understanding of disease process, treatment plan, medications, and discharge instructions  Description: Complete learning assessment and assess knowledge base.  Interventions:  - Provide teaching at level of understanding  - Provide teaching via preferred learning methods  Outcome: Progressing     Problem: CARDIOVASCULAR - ADULT  Goal: Maintains optimal cardiac output and hemodynamic  stability  Description: INTERVENTIONS:  - Monitor I/O, vital signs and rhythm  - Monitor for S/S and trends of decreased cardiac output  - Administer and titrate ordered vasoactive medications to optimize hemodynamic stability  - Assess quality of pulses, skin color and temperature  - Assess for signs of decreased coronary artery perfusion  - Instruct patient to report change in severity of symptoms  Outcome: Progressing

## 2025-02-11 NOTE — ASSESSMENT & PLAN NOTE
Reactive due to recent pneumonia.  Repeat CT in 2 to 3 months per radiology recommendation  Findings placed on AVS

## 2025-02-11 NOTE — ASSESSMENT & PLAN NOTE
Elevated   Outpatient regimen: amlodipine 10mg daily, ?carvedilol      Plan:  Hold amlodipine to titrate beta-blocker

## 2025-02-11 NOTE — INCIDENTAL FINDINGS
The following findings require follow up:  Radiographic finding   Finding: Bilateral lung infiltrate and mediastinal adenopathy   Follow up required: CT chest   Follow up should be done within 2 month(s)    Please notify the following clinician to assist with the follow up:   Hermes Menendez PA-C

## 2025-02-11 NOTE — PLAN OF CARE
Problem: PAIN - ADULT  Goal: Verbalizes/displays adequate comfort level or baseline comfort level  Description: Interventions:  - Encourage patient to monitor pain and request assistance  - Assess pain using appropriate pain scale  - Administer analgesics based on type and severity of pain and evaluate response  - Implement non-pharmacological measures as appropriate and evaluate response  - Consider cultural and social influences on pain and pain management  - Notify physician/advanced practitioner if interventions unsuccessful or patient reports new pain  Outcome: Progressing     Problem: INFECTION - ADULT  Goal: Absence or prevention of progression during hospitalization  Description: INTERVENTIONS:  - Assess and monitor for signs and symptoms of infection  - Monitor lab/diagnostic results  - Monitor all insertion sites, i.e. indwelling lines, tubes, and drains  - Monitor endotracheal if appropriate and nasal secretions for changes in amount and color  - [place] appropriate cooling/warming therapies per order  - Administer medications as ordered  - Instruct and encourage patient and family to use good hand hygiene technique  - Identify and instruct in appropriate isolation precautions for identified infection/condition  Outcome: Progressing  Goal: Absence of fever/infection during neutropenic period  Description: INTERVENTIONS:  - Monitor WBC    Outcome: Progressing

## 2025-02-11 NOTE — ASSESSMENT & PLAN NOTE
Presents with palpitations, nausea, diaphoresis over past few weeks, worsening since this AM.   hsTrop 424>427>408  EKG NSR HR 70 inferolateral t wave inversions   TTE LVEF 65%, basal inferolateral wall hypo/akinetic, mild/mod MR, mild TR  Concerning for ACS/NSTEMI    Plan:  Transfer to cardiac catheterization capable facility  Continue ASA, Brilinta, statin, heparin gtt  Start metoprolol 25mg BID  Maintain telemetry  NPO at midnight  Cardiology consult

## 2025-02-11 NOTE — ASSESSMENT & PLAN NOTE
Patient with bilateral infiltrates from recent pneumonia  She recently finished 3 courses of antibiotics for the past few weeks (Augmentin, doxycycline, Levaquin)  She currently has no fever, leukocytosis or elevated procalcitonin  She will need repeat CT in 2 to 3 months to ensure resolution  Continue supportive care.  Emphasized tobacco cessation

## 2025-02-11 NOTE — ASSESSMENT & PLAN NOTE
- admits to nausea, palpitations, and diaphoresis over the last few weeks   - TTE 02/11/25: LVEF 65%, wall thickness mildly increased, LVIDD 4.2 cm, basal inferolateral wall appears hypo to akinetic, mild MAC, mild to moderate MR, mild TR, mild TR

## 2025-02-11 NOTE — CONSULTS
Consultation - Cardiology   Name: Corie Pal 69 y.o. female I MRN: [id number]  Unit/Bed#: E4 -01 I Date of Admission: 2/10/2025   Date of Service: 2/11/2025 I Hospital Day: 1   Inpatient consult to Cardiology  Consult performed by: Laisha Lorenzana PA-C  Consult ordered by: [place] PA-C      Physician Requesting Evaluation: Carlos Mcgregor Pe*   Reason for Evaluation / Principal Problem: ACS        TODAY (02/11/25):   Elevated troponin, EKG changes and echo concerning for NSTEMI. Will plan for cardiac catheterization today. Risk versus benefits reviewed with her. Remain NPO.  Consider cultures for PNA. Lungs have decreased air movement with diffuse rhonchi (L > R)  Continue ASA, atorvastatin, ASA, and Brilinta.  Will discontinue Coreg at this time augusto BP is on the lower side. Okay to continue lopressor.  Smoking cessation advised  Further recommendations to follow after catheterization.      Assessment & Plan  ACS (acute coronary syndrome) (HCC)  - admits to nausea, palpitations, and diaphoresis over the last few weeks   - TTE 02/11/25: LVEF 65%, wall thickness mildly increased, LVIDD 4.2 cm, basal inferolateral wall appears hypo to akinetic, mild MAC, mild to moderate MR, mild TR, mild TR  Essential hypertension  - BP stable average systolic around 120 mmHg  - home rx: amlodipine 10 mg daily and coreg 6.25 mg BID  Elevated troponin  - troponin trend: 424 > 427 > 408  - EKG shows T wave inversions in inferior and lateral leads  History of recent pneumonia  - CT 02/10/25 shows bilateral airspace opacities suspicious for multifocal PNA   - have sinusitis in Nov/Dec of 2024 treated with course of Augmentin, doxycycline, and levoquin  - still has a persistent cough   Mixed hyperlipidemia  - lipid panel 08.24/23: cholesterol 232, triglycerides 83, HLD 95,   - was not on a cholesterol lowering med outpatient   Tobacco use  - 3-4 cigarettes per day for many years   History Of  Present Illness:  Corie Pal is a 69 year old with PMH of recent sinusitis, HLD, tobacco use,  who presented to [facility] complaining of worsening diaphoresis, palpitations, and nausea over the last few weeks. Workup in the ED showed elevated troponin and inferolateral T wave inversions. TTE significant for basal inferior wall motion abnormality with preserved EF. She was started on ASA, atorvastatin, Brilinta, and heparin drip and transferred to [facility] for cardiac catheterization.    Pt states she started with sinus drainage, productive cough, and SOB in November. Since then, she has noticed nausea, diaphoresis, and feeling like her heart is pounding with minimal exertion. When she does get these episodes it usually lasts for a few minutes and then resolves on its own. Yesterday, she walked down the stairs and began to have diaphoresis and nausea, heart pounding and needed to lay down on the bathroom floor but her symptoms persisted which prompted her to go to the ER. Denies chest pain, arm pain, jaw pain. She works part time at [employer] and admits sometimes at work she would need to sit down from feeling lightheaded or tight pain in her back which increased when she got sick. 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 She was on antibiotics and prednisone to help clear the infection. She admits to tobacco use for many years, currently smoking 3-4 cigarettes daily. Admits to off and on alcohol use. Denies drug use.     Rhythm History: normal sinus     Primary Cardiologist: does not follow out patient cardiology    ROS:  Review of Systems   Constitutional:  Positive for diaphoresis. Negative for fever and unexpected weight change.   Respiratory:  Positive for cough and shortness of breath.    Cardiovascular:  Positive for palpitations. Negative for chest pain and leg swelling.   Musculoskeletal:  Positive for  back pain.   Neurological:  Positive for light-headedness. Negative for dizziness and syncope.        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    iris nevus tumor removed      Past Surgical History:   Procedure Laterality Date    BREAST EXCISIONAL BIOPSY Left 1975    age 19; benign lesion    BREAST EXCISIONAL BIOPSY Left 01/04/2000    atypical hyperplasia    BREAST EXCISIONAL BIOPSY Left 01/30/2002    atypical intraductal hyperplasia    BREAST EXCISIONAL BIOPSY Left 04/17/2002    intraductal epithelial hyperplasia and papillomatosis with focal atypia    BREAST EXCISIONAL BIOPSY Left 02/25/2003    foci of atypical intraductal hyperplasia    BREAST EXCISIONAL BIOPSY Left 06/05/2006    fibrocystic change/fibrous mastopathy    BREAST EXCISIONAL BIOPSY Left 02/25/2008    fibrocystic changes    DILATION AND CURETTAGE OF UTERUS      EYE SURGERY Left 12/21/2006    HYSTERECTOMY  02/2000    OOPHORECTOMY Bilateral 2013    approximately    REFRACTIVE SURGERY      US GUIDANCE BREAST BIOPSY LEFT EACH ADDITIONAL Left 04/28/2008    benign breast tissue with stromal fibrosis and sclerosing adenosis    US GUIDED BREAST BIOPSY LEFT COMPLETE Left 04/28/2008    benign breast tissue with stromal fibrosis and sclerosing adenosis       Family History:     Family History   Problem Relation Age of Onset    Alzheimer's disease Mother     No Known Problems Father     Breast cancer Sister 42    No Known Problems Sister     No Known Problems Sister     No Known Problems Daughter     Breast cancer Maternal Grandmother 38    No Known Problems Maternal Grandfather     No Known Problems Paternal Grandmother     No Known Problems Paternal Grandfather     No Known Problems Maternal Aunt     No Known Problems  Maternal Aunt     Breast cancer Paternal Aunt         82    Breast cancer Cousin 44    Breast cancer Cousin 45    Breast cancer additional onset Neg Hx     BRCA2 Positive Neg Hx     BRCA2 Negative Neg Hx     BRCA1 Positive Neg Hx     BRCA1 Negative Neg Hx     BRCA 1/2 Neg Hx     Colon cancer Neg Hx     Ovarian cancer Neg Hx     Endometrial cancer Neg Hx         Social History:       Social History     Socioeconomic History    Marital status: /Civil Union     Spouse name: Not on file    Number of children: Not on file    Years of education: Not on file    Highest education level: Not on file   Occupational History    Not on file   Tobacco Use    Smoking status: Every Day     Current packs/day: 0.25     Average packs/day: 0.3 packs/day for 25.0 years (6.3 ttl pk-yrs)     Types: Cigarettes    Smokeless tobacco: Never   Vaping Use    Vaping status: Every Day   Substance and Sexual Activity    Alcohol use: Yes     Alcohol/week: 3.0 standard drinks of alcohol     Types: 3 Glasses of wine per week     Comment: socially    Drug use: Not Currently    Sexual activity: Not on file   Other Topics Concern    Not on file   Social History Narrative    Not on file     Social Drivers of Health     Financial Resource Strain: Low Risk  (1/28/2025)    Overall Financial Resource Strain (CARDIA)     Difficulty of Paying Living Expenses: Not hard at all   Food Insecurity: No Food Insecurity (1/28/2025)    Hunger Vital Sign     Worried About Running Out of Food in the Last Year: Never true     Ran Out of Food in the Last Year: Never true   Transportation Needs: No Transportation Needs (1/28/2025)    PRAPARE - Transportation     Lack of Transportation (Medical): No     Lack of Transportation (Non-Medical): No   Physical Activity: Not on file   Stress: Not on file   Social Connections: Unknown (6/18/2024)    Received from [facility]    Social Connections     How often do you feel lonely or isolated from those around you? (Adult - for  ages 18 years and over): Not on file   Intimate Partner Violence: Not on file   Housing Stability: Unknown (1/28/2025)    Housing Stability Vital Sign     Unable to Pay for Housing in the Last Year: No     Number of Times Moved in the Last Year: Not on file     Homeless in the Last Year: No        Allergy:        Allergies   Allergen Reactions    Bee Venom Anaphylaxis    Dayquil [Pseudoephedrine-Dm-Gg-Apap] Other (See Comments)       Medications:       Prior to Admission medications    Medication Sig Start Date End Date Taking? Authorizing Provider   albuterol (Proventil HFA) 90 mcg/act inhaler Inhale 2 puffs every 6 (six) hours as needed for wheezing 1/14/25   Gabriela Menendez PA-C   amLODIPine (NORVASC) 10 mg tablet Take 1 tablet (10 mg total) by mouth daily 1/28/25   Gabriela Menendez PA-C   brimonidine-timolol (COMBIGAN) 0.2-0.5 % Apply to eye    Historical Provider, MD   carvedilol (COREG) 6.25 mg tablet Take 6.25 mg by mouth 2 (two) times a day 10/3/24   Historical Provider, MD   EPINEPHrine (EPIPEN) 0.3 mg/0.3 mL SOAJ Inject as directed 4/25/11   Historical Provider, MD   prednisoLONE acetate (PRED MILD) 0.12 % ophthalmic suspension 1 drop 4 (four) times a day    Historical Provider, MD       Vitals:    02/10/25 2306 02/11/25 0251 02/11/25 0714   BP: 150/69 105/54 117/60   BP Location: Right arm Left arm Left arm   Pulse: 74 76 83   Resp: 22 22 22   Temp: 98.3 °F (36.8 °C) 98.3 °F (36.8 °C) 97.5 °F (36.4 °C)   TempSrc: Temporal Temporal Temporal   SpO2: 95% 94% 94%       Exam:  Physical Exam  Vitals and nursing note reviewed.   Constitutional:       General: She is not in acute distress.     Appearance: Normal appearance. She is not ill-appearing.   HENT:      Head: Normocephalic and atraumatic.      Nose: Nose normal.      Mouth/Throat:      Mouth: Mucous membranes are moist.   Eyes:      General: No scleral icterus.  Neck:      Vascular: No JVD.   Cardiovascular:      Rate and Rhythm: Normal  rate and regular rhythm.      Pulses:           Radial pulses are 2+ on the right side and 2+ on the left side.      Heart sounds: No murmur heard.  Pulmonary:      Effort: Pulmonary effort is normal. No respiratory distress.      Breath sounds: Decreased air movement present. No stridor. Rhonchi present. No wheezing or rales.   Abdominal:      General: Abdomen is flat.   Musculoskeletal:         General: No swelling. Normal range of motion.      Cervical back: Normal range of motion.      Right lower leg: No edema.      Left lower leg: No edema.   Skin:     General: Skin is warm and dry.      Capillary Refill: Capillary refill takes less than 2 seconds.   Neurological:      Mental Status: She is alert. Mental status is at baseline.   Psychiatric:         Thought Content: Thought content normal.          DATA:        ECG:      Telemetry:   Normal sinus rhythm, HR 80    Echocardiogram:  02/10/25    Left Ventricle: Left ventricular cavity size is normal. Wall thickness is mildly increased. The left ventricular ejection fraction is 65%. Systolic function is normal. Basal inferolateral wall appears hypo to akinetic.    Mitral Valve: There is mild annular calcification. There is mild to moderate regurgitation with an eccentrically directed jet.    Tricuspid Valve: There is mild regurgitation.  RVSP could not be accurately estimated due to inadequate Doppler signal.    No comparison study available.    Weights:    Wt Readings from Last 10 Encounters:   02/10/25 49 kg (108 lb)   01/28/25 47.7 kg (105 lb 2.6 oz)   01/14/25 47.4 kg (104 lb 8 oz)   01/04/25 49 kg (108 lb)   11/02/24 49.4 kg (109 lb)   01/20/24 49.9 kg (110 lb)   06/26/23 51.1 kg (112 lb 10.5 oz)   06/14/23 52.2 kg (115 lb 1.3 oz)   05/10/23 52.2 kg (115 lb 1.3 oz)   05/09/23 52.2 kg (115 lb)            Lab Studies:               Results from last 7 days   Lab Units 02/11/25  0736 02/10/25  0904   WBC Thousand/uL 7.99 9.83   HEMOGLOBIN g/dL 15.6* 16.4*    HEMATOCRIT % 46.6* 50.3*   PLATELETS Thousands/uL 229 244     Results from last 7 days   Lab Units 02/10/25  0904   POTASSIUM mmol/L 4.0   CHLORIDE mmol/L 103   CO2 mmol/L 28   BUN mg/dL 8   CREATININE mg/dL 0.41*   CALCIUM mg/dL 8.7   ALK PHOS U/L 69   ALT U/L 14   AST U/L 32     Laisha Lorenzana PA-C

## 2025-02-11 NOTE — ASSESSMENT & PLAN NOTE
Beginning Nov/Dec of last year, began with suspected sinus infection that progressed to pneumonia. Completed courses of Augmentin, doxycycline and Levaquin outpatient. Remains with persistent cough  WBC, procal wnl  Low suspicion for acute infection    Plan:  Monitor off antibiotics  Respiratory hygiene, flutter, antitussives/expectorants

## 2025-02-11 NOTE — ED CARE HANDOFF
Emergency Department Sign Out Note        Sign out and transfer of care from Dr. Bucio. See Separate Emergency Department note.     The patient, Corie Pal, was evaluated by the previous provider for chest pain.    Workup Completed:  Labs imaging    ED Course / Workup Pending (followup):  Transfer for NSTEMI, patient has remained hemodynamically stable                                     Procedures  Medical Decision Making  Amount and/or Complexity of Data Reviewed  Labs: ordered.  Radiology: ordered.    Risk  OTC drugs.  Prescription drug management.            Disposition  Final diagnoses:   NSTEMI (non-ST elevated myocardial infarction) (HCC)     Time reflects when diagnosis was documented in both MDM as applicable and the Disposition within this note       Time User Action Codes Description Comment    2/10/2025 11:02 AM Kodak Bucio Add [I21.4] NSTEMI (non-ST elevated myocardial infarction) (HCC)           ED Disposition       ED Disposition   Transfer to Another Facility-In Network    Condition   --    Date/Time   Mon Feb 10, 2025 11:02 AM    Comment   Corie Pal should be transferred out to [facility] Documentation      Flowsheet Row Most Recent Value   Patient Condition The patient has been stabilized such that within reasonable medical probability, no material deterioration of the patient condition or the condition of the unborn child(naomi) is likely to result from the transfer   Reason for Transfer Level of Care needed not available at this facility   Benefits of Transfer Specialized equipment and/or services available at the receiving facility (Include comment)________________________   Risks of Transfer Potential for delay in receiving treatment   Accepting Physician Dr. Park   Accepting Facility Name, [facility]          RN Documentation      Flowsheet Row Most Recent Value   Accepting Facility [facility], [facility]   [facility]   [facility]   Level of Care Advanced life support          Follow-up Information    None       Patient's Medications   Discharge Prescriptions    No medications on file     No discharge procedures on file.       ED Provider  Electronically Signed by     Clay Khan DO  02/10/25 0620

## 2025-02-12 ENCOUNTER — TELEPHONE (OUTPATIENT)
Dept: PULMONOLOGY | Facility: CLINIC | Age: 70
End: 2025-02-12

## 2025-02-12 PROBLEM — R79.89 ELEVATED TROPONIN LEVEL NOT DUE TO ACUTE CORONARY SYNDROME: Status: ACTIVE | Noted: 2025-02-10

## 2025-02-12 LAB
ANION GAP SERPL CALCULATED.3IONS-SCNC: 11 MMOL/L (ref 4–13)
BUN SERPL-MCNC: 7 MG/DL (ref 5–25)
CALCIUM SERPL-MCNC: 8.7 MG/DL (ref 8.4–10.2)
CHLORIDE SERPL-SCNC: 104 MMOL/L (ref 96–108)
CO2 SERPL-SCNC: 23 MMOL/L (ref 21–32)
CREAT SERPL-MCNC: 0.39 MG/DL (ref 0.6–1.3)
ERYTHROCYTE [DISTWIDTH] IN BLOOD BY AUTOMATED COUNT: 12.5 % (ref 11.6–15.1)
FLUAV RNA RESP QL NAA+PROBE: NEGATIVE
FLUBV RNA RESP QL NAA+PROBE: NEGATIVE
GFR SERPL CREATININE-BSD FRML MDRD: 108 ML/MIN/1.73SQ M
GLUCOSE SERPL-MCNC: 76 MG/DL (ref 65–140)
HCT VFR BLD AUTO: 45.5 % (ref 34.8–46.1)
HGB BLD-MCNC: 15.3 G/DL (ref 11.5–15.4)
MCH RBC QN AUTO: 33.5 PG (ref 26.8–34.3)
MCHC RBC AUTO-ENTMCNC: 33.6 G/DL (ref 31.4–37.4)
MCV RBC AUTO: 100 FL (ref 82–98)
PLATELET # BLD AUTO: 205 THOUSANDS/UL (ref 149–390)
PMV BLD AUTO: 9.8 FL (ref 8.9–12.7)
POTASSIUM SERPL-SCNC: 3.5 MMOL/L (ref 3.5–5.3)
RBC # BLD AUTO: 4.57 MILLION/UL (ref 3.81–5.12)
RSV RNA RESP QL NAA+PROBE: NEGATIVE
SARS-COV-2 RNA RESP QL NAA+PROBE: NEGATIVE
SODIUM SERPL-SCNC: 138 MMOL/L (ref 135–147)
WBC # BLD AUTO: 7.63 THOUSAND/UL (ref 4.31–10.16)

## 2025-02-12 PROCEDURE — 99223 1ST HOSP IP/OBS HIGH 75: CPT | Performed by: INTERNAL MEDICINE

## 2025-02-12 PROCEDURE — 86037 ANCA TITER EACH ANTIBODY: CPT | Performed by: PHYSICIAN ASSISTANT

## 2025-02-12 PROCEDURE — 83520 IMMUNOASSAY QUANT NOS NONAB: CPT | Performed by: PHYSICIAN ASSISTANT

## 2025-02-12 PROCEDURE — 85027 COMPLETE CBC AUTOMATED: CPT

## 2025-02-12 PROCEDURE — 99232 SBSQ HOSP IP/OBS MODERATE 35: CPT | Performed by: PHYSICIAN ASSISTANT

## 2025-02-12 PROCEDURE — 80048 BASIC METABOLIC PNL TOTAL CA: CPT

## 2025-02-12 PROCEDURE — 99232 SBSQ HOSP IP/OBS MODERATE 35: CPT | Performed by: STUDENT IN AN ORGANIZED HEALTH CARE EDUCATION/TRAINING PROGRAM

## 2025-02-12 PROCEDURE — 86038 ANTINUCLEAR ANTIBODIES: CPT | Performed by: PHYSICIAN ASSISTANT

## 2025-02-12 PROCEDURE — 86235 NUCLEAR ANTIGEN ANTIBODY: CPT | Performed by: PHYSICIAN ASSISTANT

## 2025-02-12 PROCEDURE — 0241U HB NFCT DS VIR RESP RNA 4 TRGT: CPT | Performed by: PHYSICIAN ASSISTANT

## 2025-02-12 PROCEDURE — 82746 ASSAY OF FOLIC ACID SERUM: CPT

## 2025-02-12 PROCEDURE — 86225 DNA ANTIBODY NATIVE: CPT | Performed by: PHYSICIAN ASSISTANT

## 2025-02-12 PROCEDURE — 82607 VITAMIN B-12: CPT

## 2025-02-12 RX ORDER — CARVEDILOL 6.25 MG/1
6.25 TABLET ORAL 2 TIMES DAILY WITH MEALS
Status: DISCONTINUED | OUTPATIENT
Start: 2025-02-12 | End: 2025-02-13 | Stop reason: HOSPADM

## 2025-02-12 RX ORDER — BUDESONIDE AND FORMOTEROL FUMARATE DIHYDRATE 160; 4.5 UG/1; UG/1
2 AEROSOL RESPIRATORY (INHALATION) 2 TIMES DAILY
Status: DISCONTINUED | OUTPATIENT
Start: 2025-02-12 | End: 2025-02-13 | Stop reason: HOSPADM

## 2025-02-12 RX ORDER — ENOXAPARIN SODIUM 100 MG/ML
40 INJECTION SUBCUTANEOUS
Status: DISCONTINUED | OUTPATIENT
Start: 2025-02-12 | End: 2025-02-13 | Stop reason: HOSPADM

## 2025-02-12 RX ORDER — FLUTICASONE PROPIONATE 50 MCG
1 SPRAY, SUSPENSION (ML) NASAL 2 TIMES DAILY
Status: DISCONTINUED | OUTPATIENT
Start: 2025-02-12 | End: 2025-02-13 | Stop reason: HOSPADM

## 2025-02-12 RX ORDER — LOSARTAN POTASSIUM 25 MG/1
25 TABLET ORAL DAILY
Status: DISCONTINUED | OUTPATIENT
Start: 2025-02-12 | End: 2025-02-13 | Stop reason: HOSPADM

## 2025-02-12 RX ADMIN — ATORVASTATIN CALCIUM 80 MG: 80 TABLET, FILM COATED ORAL at 18:12

## 2025-02-12 RX ADMIN — FLUTICASONE PROPIONATE 1 SPRAY: 50 SPRAY, METERED NASAL at 18:12

## 2025-02-12 RX ADMIN — LOSARTAN POTASSIUM 25 MG: 25 TABLET, FILM COATED ORAL at 22:37

## 2025-02-12 RX ADMIN — METOPROLOL TARTRATE 25 MG: 25 TABLET, FILM COATED ORAL at 08:31

## 2025-02-12 RX ADMIN — ASPIRIN 81 MG CHEWABLE TABLET 81 MG: 81 TABLET CHEWABLE at 08:31

## 2025-02-12 RX ADMIN — AMLODIPINE BESYLATE 10 MG: 10 TABLET ORAL at 08:31

## 2025-02-12 RX ADMIN — ENOXAPARIN SODIUM 40 MG: 40 INJECTION SUBCUTANEOUS at 14:50

## 2025-02-12 RX ADMIN — BUDESONIDE AND FORMOTEROL FUMARATE DIHYDRATE 2 PUFF: 160; 4.5 AEROSOL RESPIRATORY (INHALATION) at 18:12

## 2025-02-12 NOTE — ASSESSMENT & PLAN NOTE
Patient with palpitations, chest discomfort with abnormal wall motion on echocardiogram and elevated troponin  Non-MI troponin elevation, possibly secondary to respiratory process  She was initially seen at  [facility], started on heparin, aspirin and Brilinta  S/p cardiac cath without any significant lesions.  Mild CAD.  Continue aspirin, statin  Cardiology change antihypertensives to meet GDMT for mild CAD

## 2025-02-12 NOTE — PROGRESS NOTES
Cardiology Progress Note - Corie Pal 69 y.o. female MRN: [id number]    Unit/Bed#: E4 -01 Encounter: [id number]      Assessment & Plan:    Non-MI troponin elevation  -Patient having interim episodes of nausea, palpitations and diuresis over the last few weeks  - TTE 2/11/2025 showed EF 65%, hypokinetic/akinetic basal inferolateral wall, mild to moderate MR, mild TR  - Troponin trend 424->427->408  -LHC 2/12/2025 showed no angiographic evidence of significant obstructive CAD, tortuosity noted likely due to poor BP management  - Discontinued heparin and Brilinta, continue with aspirin 81 mg daily  -Unclear cause for troponin elevation    Palpitations  - Reports having intermittent episodes of racing heart probably after coughing  - Will arrange for 2 weeks Zio patch after discharge    Essential hypertension  -Outpatient Rx amlodipine 10 mg daily Coreg 6.25 mg twice daily  -Currently on Lopressor 25 mg twice daily  - Held amlodipine today for left her catheterization  - Will discontinue Lopressor and restart outpatient Coreg 6.25 mg twice daily  - Will change amlodipine to losartan 25 mg daily for GDMT    History of recent pneumonia  - CT chest 2/10/2025 showed bilateral airspace opacities suspicious for multifocal pneumonia, mediastinal lymphadenopathy    Tobacco use  - Smokes 3 to 4 cigarettes daily for many years  - Encouraged smoking cessation    Mixed hyperlipidemia  - Not on statin prior to admission  - Continue atorvastatin 80 mg daily     Summary:  -Left catheterization yesterday showed no obstructive CAD, no evidence of ACS  - Non-MI troponin elevation, unclear cause, possibly related to her pneumonia  - Will arrange for 2-week Zio patch after discharge for her palpitations  - Reviewed echocardiogram and reported wall motion abnormality may just be artifact, but will still treat with GDMT  - Will change Lopressor back to Coreg 6.25 mg twice daily and start losartan 25 mg daily in place of  amlodipine  - Otherwise stable from a cardiac standpoint    Subjective:   No significant events overnight.  She reports feeling okay this morning.  Continues to have a cough and mucus production.  Denies chest pain, abdominal pain, nausea, vomiting, fever, chills, headache, dizziness or palpitations.    Objective:     Vitals: Blood pressure 131/63, pulse 66, temperature (!) 97.2 °F (36.2 °C), temperature source Temporal, resp. rate 18, weight 47.1 kg (103 lb 13.4 oz), SpO2 100%., Body mass index is 16.26 kg/m².,   Orthostatic Blood Pressures      Flowsheet Row Most Recent Value   Blood Pressure 131/63 filed at 02/12/2025 0732   Patient Position - Orthostatic VS Sitting filed at 02/12/2025 0732              Intake/Output Summary (Last 24 hours) at 2/12/2025 1029  Last data filed at 2/11/2025 2000  Gross per 24 hour   Intake 560 ml   Output --   Net 560 ml           Physical Exam:    GEN: Corie Pal appears well, alert and oriented x 3, pleasant and cooperative   HEENT: Mucous membranes moist, no scleral icterus, no conjunctival pallor  NECK: No elevated JVD  HEART: Regular rate and rhythm, normal S1 and S2, no murmurs or rubs   LUNGS: Bilateral diffuse rhonchi  ABDOMEN: normal bowel sounds, soft, no tenderness, no distention  EXTREMITIES: peripheral pulses normal; no lower extremity edema   NEURO: no focal findings   SKIN: No lesions or rashes on exposed skin        Current Facility-Administered Medications:     acetaminophen (TYLENOL) tablet 650 mg, 650 mg, Oral, Q6H PRN, KRISTI Mckeon    aluminum-magnesium hydroxide-simethicone (MAALOX) oral suspension 30 mL, 30 mL, Oral, Q6H PRN, Carla Thao, ALDONP    amLODIPine (NORVASC) tablet 10 mg, 10 mg, Oral, Daily, KRISTI Mckeon, 10 mg at 02/12/25 0831    aspirin chewable tablet 81 mg, 81 mg, Oral, Daily, ALDO MckeonNP, 81 mg at 02/12/25 0831    atorvastatin (LIPITOR) tablet 80 mg, 80 mg, Oral, Daily With Dinner, KRISTI Mckeon, 80  "mg at 02/11/25 1734    guaiFENesin (ROBITUSSIN) oral liquid 200 mg, 200 mg, Oral, Q4H PRN, Carla Grecsek, CRNP, 200 mg at 02/11/25 2159    metoprolol tartrate (LOPRESSOR) tablet 25 mg, 25 mg, Oral, Q12H DERIC, Carla Grecsek, CRNP, 25 mg at 02/12/25 0831    nicotine (NICODERM CQ) 14 mg/24hr TD 24 hr patch 1 patch, 1 patch, Transdermal, Daily PRN, Carla Grecsek, CRNP    polyethylene glycol (MIRALAX) packet 17 g, 17 g, Oral, Daily PRN, Carla Grecsek, CRNP    trimethobenzamide (TIGAN) IM injection 200 mg, 200 mg, Intramuscular, Q6H PRN, Carla Grecsek, CRNP    Labs & Results:    No results found for: \"CKTOTAL\", \"CKMB\", \"CKMBINDEX\", \"TROPONINI\"    Lab Results   Component Value Date    CALCIUM 8.7 02/12/2025    K 3.5 02/12/2025    CO2 23 02/12/2025     02/12/2025    BUN 7 02/12/2025    CREATININE 0.39 (L) 02/12/2025       Lab Results   Component Value Date    WBC 7.63 02/12/2025    HGB 15.3 02/12/2025    HCT 45.5 02/12/2025     (H) 02/12/2025     02/12/2025     Results from last 7 days   Lab Units 02/10/25  0904   INR  0.92       No results found for: \"CHOL\"  Lab Results   Component Value Date    HDL 82 02/11/2025    HDL 93 02/10/2025     Lab Results   Component Value Date    LDLCALC 79 02/11/2025    LDLCALC 92 02/10/2025     Lab Results   Component Value Date    TRIG 112 02/11/2025    TRIG 80 02/10/2025       Lab Results   Component Value Date    ALT 14 02/10/2025    AST 32 02/10/2025    ALKPHOS 69 02/10/2025         EKG personally reviewed by )Barron Motta MD. No acute changes   TELE: No significant arrhythmias seen on telemetry review.        "

## 2025-02-12 NOTE — CONSULTS
Consultation - Pulmonary Medicine   Corie Pal 69 y.o. female MRN: [id number]  Unit/Bed#: E4 -01 Encounter: [id number]      Physician Requesting Consult: Oleksandr Katz PA-C   Reason for Consult: Abnormal chest CT scan      Assessment:  Possible acute hypoxic respiratory failure with documented saturation of 89% but improved and currently on room air  Abnormal chest CT scan with nonspecific infiltrates bilaterally most likely secondary to recent pneumonia  Subcarinal and cervical lymphadenopathy, could be reactive but also to consider malignancy such as lung cancer given her smoking history or lymphoma specially with cervical lymph nodes although these could be secondary to recent sinus infection  Mild emphysema on chest CT scan but patient is asymptomatic prior to recent sinusitis/pneumonia  Persistent cough currently, chronic most likely post pneumonia/sinusitis but also due to below  Postnasal drip  Nicotine dependence with current use      Plan:   Will start patient on inhaled corticosteroids with bronchodilator such as Breo or Symbicort and continue for few weeks after discharge  Start Flonase  Will repeat CT scan in about 6 weeks to follow on infiltrates and also the mediastinal lymphadenopathy, if persistent or increased then we will consider further evaluation with EBUS/biopsy and/or PET scan  Monitor off antibiotics, I believe patient received multiple courses and we should continue to monitor clinically for now  Encourage out of bed and ambulation incentive spirometry  Will consider PFTs in the future if she remains symptomatic   Counseled to quit smoking  Consider right cervical lymphadenopathy biopsy by IR versus excisional biopsy by general surgery during this hospitalization  And the plan as above explained in details to the patient and her  and they verbalized understanding  Discussed with [facility] team.  We will schedule patient for follow-up in our office at [place] as  requested by the patient    ______________________________________________________________________    HPI:    Corie Pal is a 69 y.o. female who presents with chest pain.    Patient has no history of pulmonary disease but around Thanksgiving in November 2024 she developed sinusitis, treated with antibiotics twice initially, this caused her to have worsening cough and had imaging studies and was diagnosed with possible pneumonia and treated with another course of antibiotics and most recently with Levaquin course.  Patient continued to have cough since then, productive of clear sputum, denies fever or chills or night sweats.  She has no history of asthma or pulmonary disease, she is a smoker as below but was not diagnosed with COPD or emphysema and was not on inhalers before until recently with albuterol that did not give her any benefit regarding her cough.  She denies hemoptysis.  She has mild dyspnea on exertion but denies wheezing.  Patient noticed some swelling in her right neck lymph nodes several weeks ago and they have been stable since then.  Patient reports postnasal drip as well.  She denies GERD or dysphagia or aspiration.    She had weight loss over the past year about 15-18 pounds.  She denies night sweats.  She has some itching during the winter usually and recently developed some rash on her abdomen that resolved.  She has no history of cancers.  She is up-to-date with mammograms and colonoscopies.  Usually her mammograms come back as atypical but benign as she states.  At baseline before all the above she did not have any dyspnea on exertion or cough or wheezing or chest tightness and she was very active climbing multiple flights of steps at work without limitation.    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  She smoked about 0.25 pack/day for the past  50-53 years but most recently for  the past few years she smokes 3 cigarettes/day.      Review of Systems:  12 points Full review of systems was performed. Aside from what was mentioned in the HPI, it is otherwise negative.    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    iris nevus tumor removed     Past Surgical History:   Procedure Laterality Date    BREAST EXCISIONAL BIOPSY Left 1975    age 19; benign lesion    BREAST EXCISIONAL BIOPSY Left 01/04/2000    atypical hyperplasia    BREAST EXCISIONAL BIOPSY Left 01/30/2002    atypical intraductal hyperplasia    BREAST EXCISIONAL BIOPSY Left 04/17/2002    intraductal epithelial hyperplasia and papillomatosis with focal atypia    BREAST EXCISIONAL BIOPSY Left 02/25/2003    foci of atypical intraductal hyperplasia    BREAST EXCISIONAL BIOPSY Left 06/05/2006    fibrocystic change/fibrous mastopathy    BREAST EXCISIONAL BIOPSY Left 02/25/2008    fibrocystic changes    DILATION AND CURETTAGE OF UTERUS      EYE SURGERY Left 12/21/2006    HYSTERECTOMY  02/2000    OOPHORECTOMY Bilateral 2013    approximately    REFRACTIVE SURGERY      US GUIDANCE BREAST BIOPSY LEFT EACH ADDITIONAL Left 04/28/2008    benign breast tissue with stromal fibrosis and sclerosing adenosis    US GUIDED BREAST BIOPSY LEFT COMPLETE Left 04/28/2008    benign breast tissue with stromal fibrosis and sclerosing adenosis     Social History   Social History     Substance and Sexual Activity   Alcohol Use Yes    Alcohol/week: 3.0 standard drinks of alcohol    Types: 3 Glasses of wine per week    Comment: socially     Social History     Tobacco Use   Smoking Status Every Day    Current packs/day: 0.25    Average packs/day: 0.3 packs/day for 25.0 years (6.3 ttl pk-yrs)    Types: Cigarettes   Smokeless Tobacco  Never       Occupational history:  No occupational exposure    Family History:   Family History   Problem Relation Age of Onset    Alzheimer's disease Mother     No Known Problems Father     Breast cancer Sister 42    No Known Problems Sister     No Known Problems Sister     No Known Problems Daughter     Breast cancer Maternal Grandmother 38    No Known Problems Maternal Grandfather     No Known Problems Paternal Grandmother     No Known Problems Paternal Grandfather     No Known Problems Maternal Aunt     No Known Problems Maternal Aunt     Breast cancer Paternal Aunt         82    Breast cancer Cousin 44    Breast cancer Cousin 45    Breast cancer additional onset Neg Hx     BRCA2 Positive Neg Hx     BRCA2 Negative Neg Hx     BRCA1 Positive Neg Hx     BRCA1 Negative Neg Hx     BRCA 1/2 Neg Hx     Colon cancer Neg Hx     Ovarian cancer Neg Hx     Endometrial cancer Neg Hx        Medications:  The patient's active and prehospital medications were reviewed.  Current Facility-Administered Medications   Medication Dose Route Frequency Provider Last Rate    acetaminophen  650 mg Oral Q6H PRN Carla Grecsek, CRNP      aluminum-magnesium hydroxide-simethicone  30 mL Oral Q6H PRN Carla Grecsek, CRNP      aspirin  81 mg Oral Daily Carla Hersoncsek, ALDONP      atorvastatin  80 mg Oral Daily With Dinner Carla Hersoncsek, CRNP      carvedilol  6.25 mg Oral BID With Meals Barron Motta MD      guaiFENesin  200 mg Oral Q4H PRN Carla Osvaldoek, CRNP      losartan  25 mg Oral Daily Barron Motta MD      nicotine  1 patch Transdermal Daily PRN Carla Rileyek, CRNP      polyethylene glycol  17 g Oral Daily PRN Carla Hersoncsek, CRNP      trimethobenzamide  200 mg Intramuscular Q6H PRN Carla Rileyek, ALDONP           PhysicalExamination:  Vitals:   Vitals:    02/11/25 2355 02/12/25 0600 02/12/25 0732 02/12/25 1115   BP: 115/59  131/63 122/58   BP Location: Left arm  Right arm Left arm   Pulse: 73  66 67   Resp:  "18 18 18   Temp: 97.5 °F (36.4 °C)  (!) 97.2 °F (36.2 °C) (!) 97.3 °F (36.3 °C)   TempSrc: Temporal  Temporal Temporal   SpO2: 91%  100% 97%   Weight:  47.1 kg (103 lb 13.4 oz)       Temp  Min: 97.2 °F (36.2 °C)  Max: 98.5 °F (36.9 °C)       SpO2: 97 %,   SpO2 Activity: At Rest,   O2 Device: None (Room air)    General: alert, not in acute distress  HEENT: PERRL, no icteric sclera or cyanosis, no thrush  Neck: Supple, no thyromegaly, no JVD.  Right anterior lower cervical lymphadenopathy with 2-3 hard mobile nontender lymph nodes measuring 1-1.5 cm  Lungs: Equal breath sounds with mild rhonchi and wheezes bilaterally, improved with cough, no crackles, good air movement  Heart: S1S2 regular, no murmurs or gallops  Abdomen: soft, nontender, bowel sounds present  Extremities: no edema, no clubbing or cyanosis  Neuro: Alert and oriented x 3, no focal neurodeficits   Skin: intact, no rashes    Diagnostic Data:  CBC:   Results from last 7 days   Lab Units 02/12/25  0610 02/11/25  0736 02/10/25  0904   WBC Thousand/uL 7.63 7.99 9.83   HEMOGLOBIN g/dL 15.3 15.6* 16.4*   HEMATOCRIT % 45.5 46.6* 50.3*   PLATELETS Thousands/uL 205 229 244       CMP:   Results from last 7 days   Lab Units 02/12/25  0610 02/11/25  0736 02/10/25  0904   POTASSIUM mmol/L 3.5 3.3* 4.0   CHLORIDE mmol/L 104 103 103   CO2 mmol/L 23 25 28   BUN mg/dL 7 5 8   CREATININE mg/dL 0.39* 0.37* 0.41*   CALCIUM mg/dL 8.7 8.9 8.7   ALK PHOS U/L  --   --  69   ALT U/L  --   --  14   AST U/L  --   --  32     PT/INR:   No results found for: \"PT\", \"INR\",     Microbiology:         ABG: No results found for: \"PHART\", \"AQF8FCS\", \"PO2ART\", \"MDD8FOI\", \"Y3EHFFHX\", \"BEART\", \"SOURCE\"    Imaging:  Chest CT scan reviewed in PACS: Very minimal scattered emphysematous changes, multiple areas of groundglass opacity/consolidation specially in the right upper lobe but also can be seen in the left upper lobe, lingula and bilateral lower lobes.  Subcarinal " "lymphadenopathy.    Cardiac lab/EKG/telemetry/ECHO:             Echocardiogram: LVEF 65%, normal RV, mild to moderate MR     Code Status: Level 1 - Full Code    Derick Lyn MD    Portions of the record may have been created with voice recognition software. Occasional wrong word or \"sound a like\" substitutions may have occurred due to the inherent limitations of voice recognition software. Read the chart carefully and recognize, using context, where substitutions have occurred.      "

## 2025-02-12 NOTE — PROGRESS NOTES
Progress Note - Hospitalist   Name: Corie Pal 69 y.o. female I MRN: [id number]  Unit/Bed#: E4 -01 I Date of Admission: 2/10/2025   Date of Service: 2/12/2025 I Hospital Day: 2    Assessment & Plan  Elevated troponin level not due to acute coronary syndrome  Patient with palpitations, chest discomfort with abnormal wall motion on echocardiogram and elevated troponin  Non-MI troponin elevation, possibly secondary to respiratory process  She was initially seen at  [facility], started on heparin, aspirin and Brilinta  S/p cardiac cath without any significant lesions.  Mild CAD.  Continue aspirin, statin  Cardiology change antihypertensives to meet GDMT for mild CAD    Abnormal CT scan of lung  Patient with bilateral infiltrates from recent pneumonia  She recently finished 3 courses of antibiotics for the past few weeks (Augmentin, doxycycline, Levaquin)  She currently has no fever, leukocytosis or elevated procalcitonin  She will need repeat CT in 2 to 3 months to ensure resolution  Continue supportive care.  Emphasized tobacco cessation  Check COVID/flu/RSV  Will alsoo order basic autoimmune panel  Mediastinal lymphadenopathy  Reactive due to recent pneumonia.  Repeat CT in 2 to 3 months per radiology recommendation  Findings placed on AVS  Essential hypertension   discussed with Sana from cardiology   Continue metoprolol 25 mg twice daily  Tobacco use  Continue nicotine patch.  Tobacco cessation emphasized  Mixed hyperlipidemia  Continue Lipitor 80 mg daily    VTE Pharmacologic Prophylaxis: VTE Score: 4 Moderate Risk (Score 3-4) - Pharmacological DVT Prophylaxis Ordered: enoxaparin (Lovenox).    Mobility:   Basic Mobility Inpatient Raw Score: 24  JH-HLM Goal: 8: Walk 250 feet or more  JH-HLM Achieved: 7: Walk 25 feet or more  JH-HLM Goal NOT achieved. Continue with multidisciplinary rounding and encourage appropriate mobility to improve upon JH-HLM goals.    Patient Centered Rounds: I performed  bedside rounds with nursing staff today.   Discussions with Specialists or Other Care Team Provider: Cardiology, will talk to pulmonology    Education and Discussions with Family / Patient: Updated  () at bedside.    Current Length of Stay: 2 day(s)  Current Patient Status: Inpatient   Certification Statement: The patient will continue to require additional inpatient hospital stay due to observation  Discharge Plan: Anticipate discharge tomorrow to home.    Code Status: Level 1 - Full Code    Subjective   Continues to have cough.  Went over cardiac cath with cardiology.   at bedside is worried that this will happen again at home.    Objective :  Temp:  [97.2 °F (36.2 °C)-98.5 °F (36.9 °C)] 97.3 °F (36.3 °C)  HR:  [63-73] 67  BP: ()/(53-67) 122/58  Resp:  [16-18] 18  SpO2:  [91 %-100 %] 97 %  O2 Device: None (Room air)  Nasal Cannula O2 Flow Rate (L/min):  [1.5 L/min] 1.5 L/min    Body mass index is 16.26 kg/m².     Input and Output Summary (last 24 hours):     Intake/Output Summary (Last 24 hours) at 2/12/2025 1256  Last data filed at 2/11/2025 2000  Gross per 24 hour   Intake 560 ml   Output --   Net 560 ml       Physical Exam  Vitals and nursing note reviewed.   Constitutional:       Appearance: Normal appearance.   HENT:      Head: Normocephalic and atraumatic.      Mouth/Throat:      Mouth: Mucous membranes are moist.      Pharynx: Oropharynx is clear. No oropharyngeal exudate.   Eyes:      Extraocular Movements: Extraocular movements intact.   Cardiovascular:      Rate and Rhythm: Normal rate and regular rhythm.      Pulses: Normal pulses.      Heart sounds: Normal heart sounds. No murmur heard.     No friction rub. No gallop.   Pulmonary:      Effort: Pulmonary effort is normal. No respiratory distress.      Breath sounds: No stridor. Rhonchi present. No wheezing or rales.   Abdominal:      General: Abdomen is flat. Bowel sounds are normal. There is no distension.       Palpations: Abdomen is soft.      Tenderness: There is no abdominal tenderness.   Musculoskeletal:      Right lower leg: No edema.      Left lower leg: No edema.   Skin:     General: Skin is warm and dry.   Neurological:      General: No focal deficit present.      Mental Status: She is alert and oriented to person, place, and time.           Lines/Drains:              Lab Results: I have reviewed the following results:   Results from last 7 days   Lab Units 02/12/25  0610 02/11/25  0736 02/10/25  0904   WBC Thousand/uL 7.63   < > 9.83   HEMOGLOBIN g/dL 15.3   < > 16.4*   HEMATOCRIT % 45.5   < > 50.3*   PLATELETS Thousands/uL 205   < > 244   SEGS PCT %  --   --  82*   LYMPHO PCT %  --   --  8*   MONO PCT %  --   --  6   EOS PCT %  --   --  2    < > = values in this interval not displayed.     Results from last 7 days   Lab Units 02/12/25  0610 02/11/25  0736 02/10/25  0904   SODIUM mmol/L 138   < > 139   POTASSIUM mmol/L 3.5   < > 4.0   CHLORIDE mmol/L 104   < > 103   CO2 mmol/L 23   < > 28   BUN mg/dL 7   < > 8   CREATININE mg/dL 0.39*   < > 0.41*   ANION GAP mmol/L 11   < > 8   CALCIUM mg/dL 8.7   < > 8.7   ALBUMIN g/dL  --   --  3.8   TOTAL BILIRUBIN mg/dL  --   --  0.56   ALK PHOS U/L  --   --  69   ALT U/L  --   --  14   AST U/L  --   --  32   GLUCOSE RANDOM mg/dL 76   < > 92    < > = values in this interval not displayed.     Results from last 7 days   Lab Units 02/10/25  0904   INR  0.92         Results from last 7 days   Lab Units 02/11/25  0736   HEMOGLOBIN A1C % 5.6     Results from last 7 days   Lab Units 02/10/25  1051   PROCALCITONIN ng/ml <0.05       Recent Cultures (last 7 days):         Imaging Results Review: No pertinent imaging studies reviewed.  Other Study Results Review: No additional pertinent studies reviewed.    Last 24 Hours Medication List:     Current Facility-Administered Medications:     acetaminophen (TYLENOL) tablet 650 mg, Q6H PRN    aluminum-magnesium hydroxide-simethicone (MAALOX)  oral suspension 30 mL, Q6H PRN    aspirin chewable tablet 81 mg, Daily    atorvastatin (LIPITOR) tablet 80 mg, Daily With Dinner    carvedilol (COREG) tablet 6.25 mg, BID With Meals    guaiFENesin (ROBITUSSIN) oral liquid 200 mg, Q4H PRN    losartan (COZAAR) tablet 25 mg, Daily    nicotine (NICODERM CQ) 14 mg/24hr TD 24 hr patch 1 patch, Daily PRN    polyethylene glycol (MIRALAX) packet 17 g, Daily PRN    trimethobenzamide (TIGAN) IM injection 200 mg, Q6H PRN    Administrative Statements   Today, Patient Was Seen By: Oleksandr Katz PA-C      **Please Note: This note may have been constructed using a voice recognition system.**

## 2025-02-12 NOTE — ASSESSMENT & PLAN NOTE
Patient with bilateral infiltrates from recent pneumonia  She recently finished 3 courses of antibiotics for the past few weeks (Augmentin, doxycycline, Levaquin)  She currently has no fever, leukocytosis or elevated procalcitonin  She will need repeat CT in 2 to 3 months to ensure resolution  Continue supportive care.  Emphasized tobacco cessation  Check COVID/flu/RSV  Will alsoo order basic autoimmune panel

## 2025-02-12 NOTE — PLAN OF CARE
Problem: PAIN - ADULT  Goal: Verbalizes/displays adequate comfort level or baseline comfort level  Description: Interventions:  - Encourage patient to monitor pain and request assistance  - Assess pain using appropriate pain scale  - Administer analgesics based on type and severity of pain and evaluate response  - Implement non-pharmacological measures as appropriate and evaluate response  - Consider cultural and social influences on pain and pain management  - Notify physician/advanced practitioner if interventions unsuccessful or patient reports new pain  Outcome: Progressing     Problem: INFECTION - ADULT  Goal: Absence or prevention of progression during hospitalization  Description: INTERVENTIONS:  - Assess and monitor for signs and symptoms of infection  - Monitor lab/diagnostic results  - Monitor all insertion sites, i.e. indwelling lines, tubes, and drains  - Monitor endotracheal if appropriate and nasal secretions for changes in amount and color  - [place] appropriate cooling/warming therapies per order  - Administer medications as ordered  - Instruct and encourage patient and family to use good hand hygiene technique  - Identify and instruct in appropriate isolation precautions for identified infection/condition  Outcome: Progressing

## 2025-02-12 NOTE — TELEPHONE ENCOUNTER
----- Message from Derick Lyn MD sent at 2/12/2025  4:04 PM EST -----  Please schedule patient follow-up appointment in 6-8 weeks at [facility].

## 2025-02-13 ENCOUNTER — TELEPHONE (OUTPATIENT)
Dept: CARDIOLOGY CLINIC | Facility: CLINIC | Age: 70
End: 2025-02-13

## 2025-02-13 ENCOUNTER — TRANSITIONAL CARE MANAGEMENT (OUTPATIENT)
Dept: FAMILY MEDICINE CLINIC | Facility: CLINIC | Age: 70
End: 2025-02-13

## 2025-02-13 VITALS
WEIGHT: 101.85 LBS | OXYGEN SATURATION: 95 % | DIASTOLIC BLOOD PRESSURE: 60 MMHG | HEART RATE: 68 BPM | TEMPERATURE: 98 F | BODY MASS INDEX: 15.95 KG/M2 | RESPIRATION RATE: 18 BRPM | SYSTOLIC BLOOD PRESSURE: 120 MMHG

## 2025-02-13 DIAGNOSIS — R79.89 ELEVATED TROPONIN LEVEL NOT DUE TO ACUTE CORONARY SYNDROME: Primary | ICD-10-CM

## 2025-02-13 DIAGNOSIS — I48.0 PAROXYSMAL ATRIAL FIBRILLATION (HCC): ICD-10-CM

## 2025-02-13 PROCEDURE — 99239 HOSP IP/OBS DSCHRG MGMT >30: CPT

## 2025-02-13 PROCEDURE — 99232 SBSQ HOSP IP/OBS MODERATE 35: CPT | Performed by: STUDENT IN AN ORGANIZED HEALTH CARE EDUCATION/TRAINING PROGRAM

## 2025-02-13 PROCEDURE — NC001 PR NO CHARGE

## 2025-02-13 RX ORDER — LOSARTAN POTASSIUM 25 MG/1
25 TABLET ORAL DAILY
Qty: 30 TABLET | Refills: 0 | Status: SHIPPED | OUTPATIENT
Start: 2025-02-14

## 2025-02-13 RX ORDER — NICOTINE 21 MG/24HR
1 PATCH, TRANSDERMAL 24 HOURS TRANSDERMAL DAILY PRN
Qty: 30 PATCH | Refills: 0 | Status: SHIPPED | OUTPATIENT
Start: 2025-02-13

## 2025-02-13 RX ORDER — BUDESONIDE AND FORMOTEROL FUMARATE DIHYDRATE 160; 4.5 UG/1; UG/1
2 AEROSOL RESPIRATORY (INHALATION) 2 TIMES DAILY
Qty: 10.2 G | Refills: 0 | Status: SHIPPED | OUTPATIENT
Start: 2025-02-13

## 2025-02-13 RX ORDER — FLUTICASONE PROPIONATE 50 MCG
1 SPRAY, SUSPENSION (ML) NASAL DAILY
Qty: 9.9 ML | Refills: 0 | Status: SHIPPED | OUTPATIENT
Start: 2025-02-13

## 2025-02-13 RX ORDER — ASPIRIN 81 MG/1
81 TABLET, CHEWABLE ORAL DAILY
Qty: 30 TABLET | Refills: 0 | Status: SHIPPED | OUTPATIENT
Start: 2025-02-14

## 2025-02-13 RX ORDER — ATORVASTATIN CALCIUM 80 MG/1
80 TABLET, FILM COATED ORAL
Qty: 30 TABLET | Refills: 0 | Status: SHIPPED | OUTPATIENT
Start: 2025-02-13

## 2025-02-13 RX ADMIN — ENOXAPARIN SODIUM 40 MG: 40 INJECTION SUBCUTANEOUS at 08:02

## 2025-02-13 RX ADMIN — FLUTICASONE PROPIONATE 1 SPRAY: 50 SPRAY, METERED NASAL at 08:00

## 2025-02-13 RX ADMIN — LOSARTAN POTASSIUM 25 MG: 25 TABLET, FILM COATED ORAL at 08:00

## 2025-02-13 RX ADMIN — CARVEDILOL 6.25 MG: 6.25 TABLET, FILM COATED ORAL at 08:00

## 2025-02-13 RX ADMIN — ASPIRIN 81 MG CHEWABLE TABLET 81 MG: 81 TABLET CHEWABLE at 08:00

## 2025-02-13 RX ADMIN — BUDESONIDE AND FORMOTEROL FUMARATE DIHYDRATE 2 PUFF: 160; 4.5 AEROSOL RESPIRATORY (INHALATION) at 08:00

## 2025-02-13 NOTE — ASSESSMENT & PLAN NOTE
Blood pressure well-controlled in 120s.  Home amlodipine has been discontinued.  Continue home Coreg 6.25 mg twice daily, initiated on losartan 25 mg daily for GDMT  Monitor BP at home and follow-up outpatient PCP

## 2025-02-13 NOTE — TELEPHONE ENCOUNTER
----- Message from Laisha Lorenzana PA-C sent at 2/13/2025 11:02 AM EST -----  Hi,     Clinical team: please call this patient to help her set up her 2 week Zio. Ordered today fro mail out.    Clerical team: Admitted to [facility] with elevated troponin and with negative [facility] fro CAD. Will order Zio. Please set her up in 6-8 weeks to review Zio and have post-hospital follow up.     Thanks,     Laisha

## 2025-02-13 NOTE — NURSING NOTE
IVs removed.  AVS given to and reviewed with patient. Questions addressed. Patient instructed to  new medications from pharmacy. Patient discharged.     [place]ia REESN, RN

## 2025-02-13 NOTE — PLAN OF CARE
Problem: PAIN - ADULT  Goal: Verbalizes/displays adequate comfort level or baseline comfort level  Description: Interventions:  - Encourage patient to monitor pain and request assistance  - Assess pain using appropriate pain scale  - Administer analgesics based on type and severity of pain and evaluate response  - Implement non-pharmacological measures as appropriate and evaluate response  - Consider cultural and social influences on pain and pain management  - Notify physician/advanced practitioner if interventions unsuccessful or patient reports new pain  Outcome: Progressing     Problem: INFECTION - ADULT  Goal: Absence or prevention of progression during hospitalization  Description: INTERVENTIONS:  - Assess and monitor for signs and symptoms of infection  - Monitor lab/diagnostic results  - Monitor all insertion sites, i.e. indwelling lines, tubes, and drains  - Monitor endotracheal if appropriate and nasal secretions for changes in amount and color  - [place] appropriate cooling/warming therapies per order  - Administer medications as ordered  - Instruct and encourage patient and family to use good hand hygiene technique  - Identify and instruct in appropriate isolation precautions for identified infection/condition  Outcome: Progressing     Problem: SAFETY ADULT  Goal: Patient will remain free of falls  Description: INTERVENTIONS:  - Educate patient/family on patient safety including physical limitations  - Instruct patient to call for assistance with activity   - Consult OT/PT to assist with strengthening/mobility   - Keep Call bell within reach  - Keep bed low and locked with side rails adjusted as appropriate  - Keep care items and personal belongings within reach  - Initiate and maintain comfort rounds  - Make Fall Risk Sign visible to staff    - Apply yellow socks and bracelet for high fall risk patients  - Consider moving patient to room near nurses station  Outcome: Progressing  Goal: Maintain or  return to baseline ADL function  Description: INTERVENTIONS:  -  Assess patient's ability to carry out ADLs; assess patient's baseline for ADL function and identify physical deficits which impact ability to perform ADLs (bathing, care of mouth/teeth, toileting, grooming, dressing, etc.)  - Assess/evaluate cause of self-care deficits   - Assess range of motion  - Assess patient's mobility; develop plan if impaired  - Assess patient's need for assistive devices and provide as appropriate  - Encourage maximum independence but intervene and supervise when necessary  - Involve family in performance of ADLs  - Assess for home care needs following discharge   - Consider OT consult to assist with ADL evaluation and planning for discharge  - Provide patient education as appropriate  Outcome: Progressing     Problem: DISCHARGE PLANNING  Goal: Discharge to home or other facility with appropriate resources  Description: INTERVENTIONS:  - Identify barriers to discharge w/patient and caregiver  - Arrange for needed discharge resources and transportation as appropriate  - Identify discharge learning needs (meds, wound care, etc.)  - Arrange for interpretive services to assist at discharge as needed  - Refer to Case Management Department for coordinating discharge planning if the patient needs post-hospital services based on physician/advanced practitioner order or complex needs related to functional status, cognitive ability, or social support system  Outcome: Progressing     Problem: Knowledge Deficit  Goal: Patient/family/caregiver demonstrates understanding of disease process, treatment plan, medications, and discharge instructions  Description: Complete learning assessment and assess knowledge base.  Interventions:  - Provide teaching at level of understanding  - Provide teaching via preferred learning methods  Outcome: Progressing     Problem: CARDIOVASCULAR - ADULT  Goal: Maintains optimal cardiac output and hemodynamic  stability  Description: INTERVENTIONS:  - Monitor I/O, vital signs and rhythm  - Monitor for S/S and trends of decreased cardiac output  - Administer and titrate ordered vasoactive medications to optimize hemodynamic stability  - Assess quality of pulses, skin color and temperature  - Assess for signs of decreased coronary artery perfusion  - Instruct patient to report change in severity of symptoms  Outcome: Progressing     Problem: Nutrition/Hydration-ADULT  Goal: Nutrient/Hydration intake appropriate for improving, restoring or maintaining nutritional needs  Description: Monitor and assess patient's nutrition/hydration status for malnutrition. Collaborate with interdisciplinary team and initiate plan and interventions as ordered.  Monitor patient's weight and dietary intake as ordered or per policy. Utilize nutrition screening tool and intervene as necessary. Determine patient's food preferences and provide high-protein, high-caloric foods as appropriate.     INTERVENTIONS:  - Monitor oral intake, urinary output, labs, and treatment plans  - Assess nutrition and hydration status and recommend course of action  - Evaluate amount of meals eaten  - Assist patient with eating if necessary   - Allow adequate time for meals  - Recommend/ encourage appropriate diets, oral nutritional supplements, and vitamin/mineral supplements  - Order, calculate, and assess calorie counts as needed  - Recommend, monitor, and adjust tube feedings and TPN/PPN based on assessed needs  - Assess need for intravenous fluids  - Provide specific nutrition/hydration education as appropriate  - Include patient/family/caregiver in decisions related to nutrition  Outcome: Progressing

## 2025-02-13 NOTE — TELEPHONE ENCOUNTER
Lmom to call back. When pt gets the patch in the mail, have her call and I can help explain it to her.

## 2025-02-13 NOTE — PROGRESS NOTES
Cardiology Progress Note - Corie Pal 69 y.o. female MRN: [id number]    Unit/Bed#: E4 -01 Encounter: [id number]      Assessment & Plan:    Non-MI troponin elevation  -Patient having interim episodes of nausea, palpitations and diuresis over the last few weeks  - TTE 2/11/2025 showed EF 65%, hypokinetic/akinetic basal inferolateral wall, mild to moderate MR, mild TR  - Troponin trend 424->427->408  -LHC 2/12/2025 showed no angiographic evidence of significant obstructive CAD, tortuosity noted likely due to poor BP management  - Discontinued heparin and Brilinta, continue with aspirin 81 mg daily  -Unclear cause for troponin elevation-> possibly related to ongoing pulmonary process/pneumonia    Palpitations  - Reports having intermittent episodes of racing heart probably after coughing  - Will arrange for 2 weeks Zio patch after discharge    Essential hypertension  -Outpatient Rx amlodipine 10 mg daily Coreg 6.25 mg twice daily  -Currently on Lopressor 25 mg twice daily  - Held amlodipine today for left her catheterization  - Will discontinue Lopressor and restart outpatient Coreg 6.25 mg twice daily  - Will change amlodipine to losartan 25 mg daily for GDMT    History of recent pneumonia  - CT chest 2/10/2025 showed bilateral airspace opacities suspicious for multifocal pneumonia, mediastinal lymphadenopathy  - Started on steroids and several inhalers by pulmonology  - Plan for outpatient follow-up with pulmonology for further management    Tobacco use  - Smokes 3 to 4 cigarettes daily for many years  - Encouraged smoking cessation    Mixed hyperlipidemia  - Not on statin prior to admission  - Continue atorvastatin 80 mg daily     Summary:  -Blood pressure remained stable today  - Continue with Coreg 6.25 mg twice daily and losartan 25 mg daily  - Stable from cardiac standpoint for discharge home today  - Will arrange for 2 weeks Zio patch after discharge  - Also arrange for outpatient cardiology  follow-up in 6 to 8 weeks    Subjective:   No significant events overnight.  She reports feel okay today has no cardiac complaints.  No significant change in her cough or mucus production.  Denies chest pain, abdominal pain, nausea, vomiting, fever, chills, headache, dizziness or palpitations.    Objective:     Vitals: Blood pressure 120/60, pulse 68, temperature 98 °F (36.7 °C), temperature source Temporal, resp. rate 18, weight 46.2 kg (101 lb 13.6 oz), SpO2 95%., Body mass index is 15.95 kg/m².,   Orthostatic Blood Pressures      Flowsheet Row Most Recent Value   Blood Pressure 120/60 filed at 02/13/2025 1137   Patient Position - Orthostatic VS Lying filed at 02/13/2025 1137              Intake/Output Summary (Last 24 hours) at 2/13/2025 1210  Last data filed at 2/13/2025 0900  Gross per 24 hour   Intake 605 ml   Output --   Net 605 ml           Physical Exam:    GEN: Corie Pal appears well, alert and oriented x 3, pleasant and cooperative   HEENT: Mucous membranes moist, no scleral icterus, no conjunctival pallor  NECK: No elevated JVD  HEART: Regular rate and rhythm, normal S1 and S2, no murmurs or rubs   LUNGS: Bilateral diffuse rhonchi  ABDOMEN: normal bowel sounds, soft, no tenderness, no distention  EXTREMITIES: peripheral pulses normal; no lower extremity edema   NEURO: no focal findings   SKIN: No lesions or rashes on exposed skin        Current Facility-Administered Medications:     acetaminophen (TYLENOL) tablet 650 mg, 650 mg, Oral, Q6H PRN, KRISTI Mckeon    aluminum-magnesium hydroxide-simethicone (MAALOX) oral suspension 30 mL, 30 mL, Oral, Q6H PRN, KRISTI Mckeon    aspirin chewable tablet 81 mg, 81 mg, Oral, Daily, KRISTI Mckeon, 81 mg at 02/13/25 0800    atorvastatin (LIPITOR) tablet 80 mg, 80 mg, Oral, Daily With Dinner, KRISTI Mckeon, 80 mg at 02/12/25 1812    budesonide-formoterol (SYMBICORT) 160-4.5 mcg/act inhaler 2 puff, 2 puff, Inhalation, BID, Malek  "Larry Lyn MD, 2 puff at 02/13/25 0800    carvedilol (COREG) tablet 6.25 mg, 6.25 mg, Oral, BID With Meals, Barron Motta MD, 6.25 mg at 02/13/25 0800    enoxaparin (LOVENOX) subcutaneous injection 40 mg, 40 mg, Subcutaneous, Q24H DERIC, Oleksandr Katz PA-C, 40 mg at 02/13/25 0802    fluticasone (FLONASE) 50 mcg/act nasal spray 1 spray, 1 spray, Each Nare, BID, Malek Larry Lyn MD, 1 spray at 02/13/25 0800    guaiFENesin (ROBITUSSIN) oral liquid 200 mg, 200 mg, Oral, Q4H PRN, Carla Grecsek, CRNP, 200 mg at 02/11/25 2159    losartan (COZAAR) tablet 25 mg, 25 mg, Oral, Daily, Barron Motta MD, 25 mg at 02/13/25 0800    nicotine (NICODERM CQ) 14 mg/24hr TD 24 hr patch 1 patch, 1 patch, Transdermal, Daily PRN, Carla Grecsek, CRNP    polyethylene glycol (MIRALAX) packet 17 g, 17 g, Oral, Daily PRN, Carla Grecsek, CRNP    trimethobenzamide (TIGAN) IM injection 200 mg, 200 mg, Intramuscular, Q6H PRN, Carla Grecsek, CRNP    Labs & Results:    No results found for: \"CKTOTAL\", \"CKMB\", \"CKMBINDEX\", \"TROPONINI\"    Lab Results   Component Value Date    CALCIUM 8.7 02/12/2025    K 3.5 02/12/2025    CO2 23 02/12/2025     02/12/2025    BUN 7 02/12/2025    CREATININE 0.39 (L) 02/12/2025       Lab Results   Component Value Date    WBC 7.63 02/12/2025    HGB 15.3 02/12/2025    HCT 45.5 02/12/2025     (H) 02/12/2025     02/12/2025     Results from last 7 days   Lab Units 02/10/25  0904   INR  0.92       No results found for: \"CHOL\"  Lab Results   Component Value Date    HDL 82 02/11/2025    HDL 93 02/10/2025     Lab Results   Component Value Date    LDLCALC 79 02/11/2025    LDLCALC 92 02/10/2025     Lab Results   Component Value Date    TRIG 112 02/11/2025    TRIG 80 02/10/2025       Lab Results   Component Value Date    ALT 14 02/10/2025    AST 32 02/10/2025    ALKPHOS 69 02/10/2025         EKG personally reviewed by )Barron Motta MD. No acute changes   TELE: No " significant arrhythmias seen on telemetry review.

## 2025-02-13 NOTE — ASSESSMENT & PLAN NOTE
Patient has recently finished 3 courses of antibiotics for the past few weeks, Augmentin doxycycline and Levaquin  Patient had a repeat CT 2/10 chest with nonspecific infiltrates bilaterally -this is felt to be secondary to recent pneumonia  She is without hypoxia, fever leukocytosis or elevated procalcitonin  Viral panel here negative  Basic autoimmune panel was ordered-pending on discharge and patient is aware she will need to follow-up with PCP for this  Evaluated by Dr. Lyn.  Reviewed recommendations and discussed bedside with patient and her spouse.  Recommendation is to repeat CT scan in about 6 weeks  Start inhaled corticosteroid bronchodilator Symbicort and intranasal spray Flonase  Consider PFTs in the future  Consider right cervical lymphadenopathy biopsy in the outpatient pending repeat CT imaging.  Patient is aware malignancy cannot be ruled out and it would be important to follow-up with this.  Patient pulmonology follow-up at the [facility]

## 2025-02-13 NOTE — PLAN OF CARE
Problem: PAIN - ADULT  Goal: Verbalizes/displays adequate comfort level or baseline comfort level  Description: Interventions:  - Encourage patient to monitor pain and request assistance  - Assess pain using appropriate pain scale  - Administer analgesics based on type and severity of pain and evaluate response  - Implement non-pharmacological measures as appropriate and evaluate response  - Consider cultural and social influences on pain and pain management  - Notify physician/advanced practitioner if interventions unsuccessful or patient reports new pain  Outcome: Progressing     Problem: INFECTION - ADULT  Goal: Absence or prevention of progression during hospitalization  Description: INTERVENTIONS:  - Assess and monitor for signs and symptoms of infection  - Monitor lab/diagnostic results  - Monitor all insertion sites, i.e. indwelling lines, tubes, and drains  - Monitor endotracheal if appropriate and nasal secretions for changes in amount and color  - [place] appropriate cooling/warming therapies per order  - Administer medications as ordered  - Instruct and encourage patient and family to use good hand hygiene technique  - Identify and instruct in appropriate isolation precautions for identified infection/condition  Outcome: Progressing     Problem: SAFETY ADULT  Goal: Patient will remain free of falls  Description: INTERVENTIONS:  - Educate patient/family on patient safety including physical limitations  - Instruct patient to call for assistance with activity   - Consult OT/PT to assist with strengthening/mobility   - Keep Call bell within reach  - Keep bed low and locked with side rails adjusted as appropriate  - Keep care items and personal belongings within reach  - Initiate and maintain comfort rounds  - Make Fall Risk Sign visible to staff    - Apply yellow socks and bracelet for high fall risk patients  - Consider moving patient to room near nurses station  Outcome: Progressing  Goal: Maintain or  return to baseline ADL function  Description: INTERVENTIONS:  -  Assess patient's ability to carry out ADLs; assess patient's baseline for ADL function and identify physical deficits which impact ability to perform ADLs (bathing, care of mouth/teeth, toileting, grooming, dressing, etc.)  - Assess/evaluate cause of self-care deficits   - Assess range of motion  - Assess patient's mobility; develop plan if impaired  - Assess patient's need for assistive devices and provide as appropriate  - Encourage maximum independence but intervene and supervise when necessary  - Involve family in performance of ADLs  - Assess for home care needs following discharge   - Consider OT consult to assist with ADL evaluation and planning for discharge  - Provide patient education as appropriate  Outcome: Progressing     Problem: DISCHARGE PLANNING  Goal: Discharge to home or other facility with appropriate resources  Description: INTERVENTIONS:  - Identify barriers to discharge w/patient and caregiver  - Arrange for needed discharge resources and transportation as appropriate  - Identify discharge learning needs (meds, wound care, etc.)  - Arrange for interpretive services to assist at discharge as needed  - Refer to Case Management Department for coordinating discharge planning if the patient needs post-hospital services based on physician/advanced practitioner order or complex needs related to functional status, cognitive ability, or social support system  Outcome: Progressing     Problem: Knowledge Deficit  Goal: Patient/family/caregiver demonstrates understanding of disease process, treatment plan, medications, and discharge instructions  Description: Complete learning assessment and assess knowledge base.  Interventions:  - Provide teaching at level of understanding  - Provide teaching via preferred learning methods  Outcome: Progressing     Problem: CARDIOVASCULAR - ADULT  Goal: Maintains optimal cardiac output and hemodynamic  stability  Description: INTERVENTIONS:  - Monitor I/O, vital signs and rhythm  - Monitor for S/S and trends of decreased cardiac output  - Administer and titrate ordered vasoactive medications to optimize hemodynamic stability  - Assess quality of pulses, skin color and temperature  - Assess for signs of decreased coronary artery perfusion  - Instruct patient to report change in severity of symptoms  Outcome: Progressing     Problem: Nutrition/Hydration-ADULT  Goal: Nutrient/Hydration intake appropriate for improving, restoring or maintaining nutritional needs  Description: Monitor and assess patient's nutrition/hydration status for malnutrition. Collaborate with interdisciplinary team and initiate plan and interventions as ordered.  Monitor patient's weight and dietary intake as ordered or per policy. Utilize nutrition screening tool and intervene as necessary. Determine patient's food preferences and provide high-protein, high-caloric foods as appropriate.     INTERVENTIONS:  - Monitor oral intake, urinary output, labs, and treatment plans  - Assess nutrition and hydration status and recommend course of action  - Evaluate amount of meals eaten  - Assist patient with eating if necessary   - Allow adequate time for meals  - Recommend/ encourage appropriate diets, oral nutritional supplements, and vitamin/mineral supplements  - Order, calculate, and assess calorie counts as needed  - Recommend, monitor, and adjust tube feedings and TPN/PPN based on assessed needs  - Assess need for intravenous fluids  - Provide specific nutrition/hydration education as appropriate  - Include patient/family/caregiver in decisions related to nutrition  Outcome: Progressing

## 2025-02-13 NOTE — ASSESSMENT & PLAN NOTE
Patient has both subcarinal and cervical lymphadenopathy which is felt to be reactive secondary to her recent pneumonia  Malignancy should be considered such as lung cancer given her smoking history or lymphoma  After discussion bedside patient and , defer biopsy here and will follow-up in the outpatient with repeat imaging

## 2025-02-13 NOTE — ASSESSMENT & PLAN NOTE
Having intermittent episodes of nausea palpitations over the last few weeks, transferred from [facility] due to abnormal wall motion on echocardiogram and elevated troponin  Non-MI troponin elevation as left heart cath 2/12/2025 without any obstructive CAD - ACS ruled out  Patient does have mild CAD on cardiac catheterization and is recommended for GDMT on discharge -aspirin, statin, beta-blocker and ARB sent to pharmacy  Outpatient cardiology follow up which is being scheduled at [facility] and Zio patch sent per cardiology  Chest pain free at bedside, no current complaints patient presented with

## 2025-02-13 NOTE — DISCHARGE SUMMARY
Discharge Summary - Hospitalist   Name: Corie Pal 69 y.o. female I MRN: [id number]  Unit/Bed#: E4 -01 I Date of Admission: 2/10/2025   Date of Service: 2/13/2025 I Hospital Day: 3     Assessment & Plan  Elevated troponin level not due to acute coronary syndrome  Having intermittent episodes of nausea palpitations over the last few weeks, transferred from [facility] due to abnormal wall motion on echocardiogram and elevated troponin  Non-MI troponin elevation as left heart cath 2/12/2025 without any obstructive CAD - ACS ruled out  Patient does have mild CAD on cardiac catheterization and is recommended for GDMT on discharge -aspirin, statin, beta-blocker and ARB sent to pharmacy  Outpatient cardiology follow up which is being scheduled at [facility] and Zio patch sent per cardiology  Chest pain free at bedside, no current complaints patient presented with  Abnormal CT scan of lung  Patient has recently finished 3 courses of antibiotics for the past few weeks, Augmentin doxycycline and Levaquin  Patient had a repeat CT 2/10 chest with nonspecific infiltrates bilaterally -this is felt to be secondary to recent pneumonia  She is without hypoxia, fever leukocytosis or elevated procalcitonin  Viral panel here negative  Basic autoimmune panel was ordered-pending on discharge and patient is aware she will need to follow-up with PCP for this  Evaluated by Dr. Lyn.  Reviewed recommendations and discussed bedside with patient and her spouse.  Recommendation is to repeat CT scan in about 6 weeks  Start inhaled corticosteroid bronchodilator Symbicort and intranasal spray Flonase  Consider PFTs in the future  Consider right cervical lymphadenopathy biopsy in the outpatient pending repeat CT imaging.  Patient is aware malignancy cannot be ruled out and it would be important to follow-up with this.  Patient pulmonology follow-up at the [place] office  Mediastinal lymphadenopathy  Patient has both subcarinal and cervical  lymphadenopathy which is felt to be reactive secondary to her recent pneumonia  Malignancy should be considered such as lung cancer given her smoking history or lymphoma  After discussion bedside patient and , defer biopsy here and will follow-up in the outpatient with repeat imaging  Essential hypertension  Blood pressure well-controlled in 120s.  Home amlodipine has been discontinued.  Continue home Coreg 6.25 mg twice daily, initiated on losartan 25 mg daily for GDMT  Monitor BP at home and follow-up outpatient PCP  Tobacco use  Tobacco cessation has been emphasized.  Continue nicotine patch  Mixed hyperlipidemia  Continue newly added statin on discharge     Medical Problems       Resolved Problems  Date Reviewed: 1/28/2025   None       Discharging Physician / Practitioner: Christiano Sorto PA-C  PCP: Gabriela Menendez PA-C  Admission Date:   Admission Orders (From admission, onward)       Ordered        02/10/25 2313  Inpatient Admission  Once                          Discharge Date: 02/13/25    Consultations During Hospital Stay:  Cardiology   Pulmonology    Procedures Performed:   [facility] 2/12/2025    Significant Findings / Test Results:   Cardiac catheterization  Result Date: 2/11/2025  Narrative:   No angiographic evidence of significant obstructive CAD though noted disctal vessel tortuosisty concerning for poor BP management   LVEDP normal without gradient on LV-AO pullback     Echo complete w/ contrast if indicated  Result Date: 2/10/2025  Narrative:   Left Ventricle: Left ventricular cavity size is normal. Wall thickness is mildly increased. The left ventricular ejection fraction is 65%. Systolic function is normal. Basal inferolateral wall appears hypo to akinetic.   Mitral Valve: There is mild annular calcification. There is mild to moderate regurgitation with an eccentrically directed jet.   Tricuspid Valve: There is mild regurgitation.  RVSP could not be accurately estimated due to inadequate  Doppler signal.   No comparison study available.     CT chest without contrast  Result Date: 2/10/2025  Impression: 1.  Bilateral airspace opacities suspicious for multifocal pneumonia. Recommend follow-up chest CT in 2 to 3 months to confirm resolution. 2.  Mediastinal lymphadenopathy including 2.3 cm subcarinal lymph node. This may be reactive to the above. Attention on recommended follow-up. The study was marked in EPIC for immediate notification.     XR chest 1 view portable  Result Date: 2/10/2025  Impression: Right lung opacities potentially representing pneumonia.    Incidental Findings:    Bilateral lung infiltrate and mediastinal adenopathy   I reviewed the above mentioned incidental findings with the patient and/or family and they expressed understanding.    Test Results Pending at Discharge (will require follow up):   Auto-immune work up     Outpatient Tests Requested:  Zio patch as ordered by cardiology  Consider PFT, defer to outpatient pulmonology provider  Repeat CT chest in 6 weeks with outpatient pulmonologist  Establish care with PCP, referral sent  Outpatient biopsy and evaluation for lymphadenopathy pending repeat chest imaging    Reason for Admission: non MI elevated troponin    Hospital Course:   Corie Pal is a 69 y.o. female patient who originally presented to the hospital on 2/10/2025 due to elevated troponins, abnormalities on echocardiogram.  Underwent cardiac catheterization workup as stated above which did not display any obstructive CAD and ACS was ruled out.  Due to wall motion abnormalities patient's amlodipine was discontinued and she was started on GDMT with losartan.  Her home Coreg was resumed and she will continue on aspirin and statin.  Patient was seen in consultation by pulmonology due to abnormal CT chest.  She was started on Symbicort and had negative infectious workup: Patient.  She will require close follow-up in the outpatient setting with both pulmonology and  cardiology.  She will be undergoing Zio patch evaluation which was ordered by cardiology on discharge.  She is also recommended for repeat chest imaging in 6 weeks to ensure that the pneumonia and lymphadenopathy is resolving.  Did have discussion with patient and spouse bedside about inpatient biopsy of enlarged lymph node here versus the outpatient.  Patient would like to follow-up in the outpatient setting after repeat imaging to consider this if lymphadenopathy continues.  She is aware malignancy cannot be ruled out.  On day of discharge she was feeling well and ready to go home.  She had no evidence of hypoxia or chest complaint symptoms.  Referral sources were sent for patient on discharge and discharge instructions were provided.    Please see above list of diagnoses and related plan for additional information.     Condition at Discharge: fair    Discharge Day Visit / Exam:   Subjective: Patient seen and examined.  We reviewed over recommendations in her hospital stay.  She would really like to go home today and does not want to stay another day.  She currently has no complaints of chest pain shortness of breath or cough.  She feels that the inhaler is actually helping her break up mucus and the Flonase is helping.    Vitals: Blood Pressure: 120/60 (02/13/25 1137)  Pulse: 68 (02/13/25 1137)  Temperature: 98 °F (36.7 °C) (02/13/25 0729)  Temp Source: Temporal (02/13/25 0729)  Respirations: 18 (02/13/25 1137)  Weight - Scale: 46.2 kg (101 lb 13.6 oz) (02/13/25 0600)  SpO2: 95 % (02/13/25 0729)    Physical Exam  Vitals and nursing note reviewed.   Constitutional:       Appearance: She is not toxic-appearing or diaphoretic.   Neck:      Comments: Right anterior lower cervical lymphadenopathy with some hard mobile nontender lymph node  Cardiovascular:      Rate and Rhythm: Normal rate and regular rhythm.   Pulmonary:      Effort: Pulmonary effort is normal. No respiratory distress.      Comments: Some rhonchorous  breath sounds throughout, on room air.  Abdominal:      General: Bowel sounds are normal.      Palpations: Abdomen is soft.      Tenderness: There is no abdominal tenderness.   Musculoskeletal:      Right lower leg: No edema.      Left lower leg: No edema.      Comments: Radial band without bruising or swelling, dressing CDI   Skin:     General: Skin is warm and dry.   Neurological:      Mental Status: She is alert and oriented to person, place, and time. Mental status is at baseline.          Discussion with Family: Updated  (significant other) at bedside.    Discharge instructions/Information to patient and family:   See after visit summary for information provided to patient and family.      Provisions for Follow-Up Care:  See after visit summary for information related to follow-up care and any pertinent home health orders.      Mobility at time of Discharge:   Basic Mobility Inpatient Raw Score: 24  JH-HLM Goal: 8: Walk 250 feet or more  JH-HLM Achieved: 8: Walk 250 feet ot more  HLM Goal achieved. Continue to encourage appropriate mobility.     Disposition:   Home    Planned Readmission: No    Discharge Medications:  See after visit summary for reconciled discharge medications provided to patient and/or family.      Administrative Statements   Discharge Statement:  I have spent a total time of 45 minutes in caring for this patient on the day of the visit/encounter.   **Please Note: This note may have been constructed using a voice recognition system**

## 2025-02-13 NOTE — PLAN OF CARE
Problem: PAIN - ADULT  Goal: Verbalizes/displays adequate comfort level or baseline comfort level  Description: Interventions:  - Encourage patient to monitor pain and request assistance  - Assess pain using appropriate pain scale  - Administer analgesics based on type and severity of pain and evaluate response  - Implement non-pharmacological measures as appropriate and evaluate response  - Consider cultural and social influences on pain and pain management  - Notify physician/advanced practitioner if interventions unsuccessful or patient reports new pain  2/13/2025 1206 by Georgia Chandler  Outcome: Adequate for Discharge  2/13/2025 1029 by Georgia Chandler  Outcome: Progressing     Problem: INFECTION - ADULT  Goal: Absence or prevention of progression during hospitalization  Description: INTERVENTIONS:  - Assess and monitor for signs and symptoms of infection  - Monitor lab/diagnostic results  - Monitor all insertion sites, i.e. indwelling lines, tubes, and drains  - Monitor endotracheal if appropriate and nasal secretions for changes in amount and color  - [place] appropriate cooling/warming therapies per order  - Administer medications as ordered  - Instruct and encourage patient and family to use good hand hygiene technique  - Identify and instruct in appropriate isolation precautions for identified infection/condition  2/13/2025 1206 by Georgia Chandler  Outcome: Adequate for Discharge  2/13/2025 1029 by Georgia Chandler  Outcome: Progressing     Problem: SAFETY ADULT  Goal: Patient will remain free of falls  Description: INTERVENTIONS:  - Educate patient/family on patient safety including physical limitations  - Instruct patient to call for assistance with activity   - Consult OT/PT to assist with strengthening/mobility   - Keep Call bell within reach  - Keep bed low and locked with side rails adjusted as appropriate  - Keep care items and personal belongings within reach  - Initiate and maintain comfort  rounds  - Make Fall Risk Sign visible to staff    - Apply yellow socks and bracelet for high fall risk patients  - Consider moving patient to room near nurses station  2/13/2025 1206 by Georgia Chandler  Outcome: Adequate for Discharge  2/13/2025 1029 by Georgia Chandler  Outcome: Progressing  Goal: Maintain or return to baseline ADL function  Description: INTERVENTIONS:  -  Assess patient's ability to carry out ADLs; assess patient's baseline for ADL function and identify physical deficits which impact ability to perform ADLs (bathing, care of mouth/teeth, toileting, grooming, dressing, etc.)  - Assess/evaluate cause of self-care deficits   - Assess range of motion  - Assess patient's mobility; develop plan if impaired  - Assess patient's need for assistive devices and provide as appropriate  - Encourage maximum independence but intervene and supervise when necessary  - Involve family in performance of ADLs  - Assess for home care needs following discharge   - Consider OT consult to assist with ADL evaluation and planning for discharge  - Provide patient education as appropriate  2/13/2025 1206 by Georgia Chandler  Outcome: Adequate for Discharge  2/13/2025 1029 by Georgia Chandler  Outcome: Progressing     Problem: DISCHARGE PLANNING  Goal: Discharge to home or other facility with appropriate resources  Description: INTERVENTIONS:  - Identify barriers to discharge w/patient and caregiver  - Arrange for needed discharge resources and transportation as appropriate  - Identify discharge learning needs (meds, wound care, etc.)  - Arrange for interpretive services to assist at discharge as needed  - Refer to Case Management Department for coordinating discharge planning if the patient needs post-hospital services based on physician/advanced practitioner order or complex needs related to functional status, cognitive ability, or social support system  2/13/2025 1206 by Georgia Chandler  Outcome: Adequate for  Discharge  2/13/2025 1029 by Georgia Chandler  Outcome: Progressing     Problem: Knowledge Deficit  Goal: Patient/family/caregiver demonstrates understanding of disease process, treatment plan, medications, and discharge instructions  Description: Complete learning assessment and assess knowledge base.  Interventions:  - Provide teaching at level of understanding  - Provide teaching via preferred learning methods  2/13/2025 1206 by Georgia Chandler  Outcome: Adequate for Discharge  2/13/2025 1029 by Georgia Chandler  Outcome: Progressing     Problem: CARDIOVASCULAR - ADULT  Goal: Maintains optimal cardiac output and hemodynamic stability  Description: INTERVENTIONS:  - Monitor I/O, vital signs and rhythm  - Monitor for S/S and trends of decreased cardiac output  - Administer and titrate ordered vasoactive medications to optimize hemodynamic stability  - Assess quality of pulses, skin color and temperature  - Assess for signs of decreased coronary artery perfusion  - Instruct patient to report change in severity of symptoms  2/13/2025 1206 by Georgia Chandler  Outcome: Adequate for Discharge  2/13/2025 1029 by Georgia Chandler  Outcome: Progressing     Problem: Nutrition/Hydration-ADULT  Goal: Nutrient/Hydration intake appropriate for improving, restoring or maintaining nutritional needs  Description: Monitor and assess patient's nutrition/hydration status for malnutrition. Collaborate with interdisciplinary team and initiate plan and interventions as ordered.  Monitor patient's weight and dietary intake as ordered or per policy. Utilize nutrition screening tool and intervene as necessary. Determine patient's food preferences and provide high-protein, high-caloric foods as appropriate.     INTERVENTIONS:  - Monitor oral intake, urinary output, labs, and treatment plans  - Assess nutrition and hydration status and recommend course of action  - Evaluate amount of meals eaten  - Assist patient with eating if necessary   -  Allow adequate time for meals  - Recommend/ encourage appropriate diets, oral nutritional supplements, and vitamin/mineral supplements  - Order, calculate, and assess calorie counts as needed  - Recommend, monitor, and adjust tube feedings and TPN/PPN based on assessed needs  - Assess need for intravenous fluids  - Provide specific nutrition/hydration education as appropriate  - Include patient/family/caregiver in decisions related to nutrition  2/13/2025 1206 by Georgia Chandler  Outcome: Adequate for Discharge  2/13/2025 1029 by Georgia Chandler  Outcome: Progressing

## 2025-02-14 ENCOUNTER — TRANSITIONAL CARE MANAGEMENT (OUTPATIENT)
Dept: FAMILY MEDICINE CLINIC | Facility: CLINIC | Age: 70
End: 2025-02-14

## 2025-02-14 LAB
C-ANCA TITR SER IF: NORMAL TITER
MYELOPEROXIDASE AB SER IA-ACNC: <0.2 UNITS (ref 0–0.9)
P-ANCA ATYPICAL TITR SER IF: NORMAL TITER
P-ANCA TITR SER IF: NORMAL TITER
PROTEINASE3 AB SER IA-ACNC: <0.2 UNITS (ref 0–0.9)

## 2025-02-15 LAB
FOLATE SERPL-MCNC: 8.6 NG/ML
VIT B12 SERPL-MCNC: 198 PG/ML (ref 180–914)

## 2025-02-16 LAB
DSDNA IGG SERPL IA-ACNC: 1 IU/ML (ref ?–15)
ENA SCL70 IGG SER IA-ACNC: <0.6 U/ML (ref ?–10)
ENA SM IGG SER IA-ACNC: <0.8 U/ML (ref ?–10)
ENA SS-A AB SER IA-ACNC: 0.5 U/ML (ref ?–10)
ENA SS-A AB SER IA-ACNC: 0.5 U/ML (ref ?–10)
ENA SS-B IGG SER IA-ACNC: <0.6 U/ML (ref ?–10)
ENA SS-B IGG SER IA-ACNC: <0.6 U/ML (ref ?–10)
NUCLEAR IGG SER IA-RTO: 0.2 RATIO (ref ?–1)

## 2025-03-07 ENCOUNTER — HOSPITAL ENCOUNTER (OUTPATIENT)
Dept: CT IMAGING | Facility: HOSPITAL | Age: 70
End: 2025-03-07
Payer: MEDICARE

## 2025-03-07 DIAGNOSIS — R22.1 LOCALIZED SWELLING, MASS AND LUMP, NECK: ICD-10-CM

## 2025-03-07 PROCEDURE — 70491 CT SOFT TISSUE NECK W/DYE: CPT

## 2025-03-07 RX ADMIN — IOHEXOL 85 ML: 350 INJECTION, SOLUTION INTRAVENOUS at 08:13

## 2025-03-11 ENCOUNTER — TELEPHONE (OUTPATIENT)
Dept: CARDIOLOGY CLINIC | Facility: CLINIC | Age: 70
End: 2025-03-11

## 2025-03-13 ENCOUNTER — HOSPITAL ENCOUNTER (OUTPATIENT)
Dept: PULMONOLOGY | Facility: HOSPITAL | Age: 70
Discharge: HOME | End: 2025-03-13

## 2025-03-13 RX ORDER — ALBUTEROL SULFATE 0.83 MG/ML
2.5 SOLUTION RESPIRATORY (INHALATION) ONCE AS NEEDED
Status: DISCONTINUED | OUTPATIENT
Start: 2025-03-13 | End: 2025-03-17 | Stop reason: HOSPADM

## 2025-03-19 RX ORDER — BRIMONIDINE TARTRATE 1.5 MG/ML
SOLUTION/ DROPS OPHTHALMIC
COMMUNITY

## 2025-03-19 RX ORDER — LORATADINE 10 MG/1
10 TABLET ORAL DAILY
COMMUNITY
Start: 2025-02-28

## 2025-03-27 RX ORDER — SUCRALFATE 1 G/1
1 TABLET ORAL
COMMUNITY
Start: 2025-03-19

## 2025-03-27 RX ORDER — IPRATROPIUM BROMIDE AND ALBUTEROL SULFATE 2.5; .5 MG/3ML; MG/3ML
3 SOLUTION RESPIRATORY (INHALATION) 4 TIMES DAILY
COMMUNITY
Start: 2025-03-14

## 2025-03-27 RX ORDER — BENZONATATE 100 MG/1
100 CAPSULE ORAL
COMMUNITY
Start: 2025-03-13

## 2025-03-30 ENCOUNTER — HOSPITAL ENCOUNTER (EMERGENCY)
Facility: HOSPITAL | Age: 70
Discharge: HOME | End: 2025-03-30
Attending: EMERGENCY MEDICINE
Payer: MEDICARE

## 2025-03-30 VITALS
OXYGEN SATURATION: 96 % | RESPIRATION RATE: 32 BRPM | DIASTOLIC BLOOD PRESSURE: 59 MMHG | WEIGHT: 107.36 LBS | TEMPERATURE: 97.9 F | SYSTOLIC BLOOD PRESSURE: 129 MMHG | BODY MASS INDEX: 16.82 KG/M2 | HEART RATE: 78 BPM

## 2025-03-30 DIAGNOSIS — J42 CHRONIC BRONCHITIS (HCC): ICD-10-CM

## 2025-03-30 DIAGNOSIS — R60.0 PEDAL EDEMA: Primary | ICD-10-CM

## 2025-03-30 LAB
ALBUMIN SERPL BCG-MCNC: 3.9 G/DL (ref 3.5–5)
ALP SERPL-CCNC: 83 U/L (ref 34–104)
ALT SERPL W P-5'-P-CCNC: 20 U/L (ref 7–52)
ANION GAP SERPL CALCULATED.3IONS-SCNC: 6 MMOL/L (ref 4–13)
APTT PPP: 25 SECONDS (ref 23–34)
AST SERPL W P-5'-P-CCNC: 24 U/L (ref 13–39)
BASOPHILS # BLD AUTO: 0.06 THOUSANDS/ÂΜL (ref 0–0.1)
BASOPHILS NFR BLD AUTO: 1 % (ref 0–1)
BILIRUB SERPL-MCNC: 0.76 MG/DL (ref 0.2–1)
BNP SERPL-MCNC: 134 PG/ML (ref 0–100)
BUN SERPL-MCNC: 7 MG/DL (ref 5–25)
CALCIUM SERPL-MCNC: 8.7 MG/DL (ref 8.4–10.2)
CHLORIDE SERPL-SCNC: 101 MMOL/L (ref 96–108)
CO2 SERPL-SCNC: 29 MMOL/L (ref 21–32)
CREAT SERPL-MCNC: 0.43 MG/DL (ref 0.6–1.3)
CRP SERPL QL: 1.3 MG/L
D DIMER PPP FEU-MCNC: 0.47 UG/ML FEU
EOSINOPHIL # BLD AUTO: 0.32 THOUSAND/ÂΜL (ref 0–0.61)
EOSINOPHIL NFR BLD AUTO: 4 % (ref 0–6)
ERYTHROCYTE [DISTWIDTH] IN BLOOD BY AUTOMATED COUNT: 13.1 % (ref 11.6–15.1)
ERYTHROCYTE [SEDIMENTATION RATE] IN BLOOD: 6 MM/HOUR (ref 0–29)
FLUAV AG UPPER RESP QL IA.RAPID: NEGATIVE
FLUBV AG UPPER RESP QL IA.RAPID: NEGATIVE
GFR SERPL CREATININE-BSD FRML MDRD: 104 ML/MIN/1.73SQ M
GLUCOSE SERPL-MCNC: 106 MG/DL (ref 65–140)
HCT VFR BLD AUTO: 46.7 % (ref 34.8–46.1)
HGB BLD-MCNC: 15.3 G/DL (ref 11.5–15.4)
IMM GRANULOCYTES # BLD AUTO: 0.03 THOUSAND/UL (ref 0–0.2)
IMM GRANULOCYTES NFR BLD AUTO: 0 % (ref 0–2)
INR PPP: 0.95 (ref 0.85–1.19)
LYMPHOCYTES # BLD AUTO: 0.61 THOUSANDS/ÂΜL (ref 0.6–4.47)
LYMPHOCYTES NFR BLD AUTO: 8 % (ref 14–44)
MAGNESIUM SERPL-MCNC: 2 MG/DL (ref 1.9–2.7)
MCH RBC QN AUTO: 32.1 PG (ref 26.8–34.3)
MCHC RBC AUTO-ENTMCNC: 32.8 G/DL (ref 31.4–37.4)
MCV RBC AUTO: 98 FL (ref 82–98)
MONOCYTES # BLD AUTO: 0.61 THOUSAND/ÂΜL (ref 0.17–1.22)
MONOCYTES NFR BLD AUTO: 8 % (ref 4–12)
NEUTROPHILS # BLD AUTO: 5.69 THOUSANDS/ÂΜL (ref 1.85–7.62)
NEUTS SEG NFR BLD AUTO: 79 % (ref 43–75)
NRBC BLD AUTO-RTO: 0 /100 WBCS
PLATELET # BLD AUTO: 304 THOUSANDS/UL (ref 149–390)
PMV BLD AUTO: 8.8 FL (ref 8.9–12.7)
POTASSIUM SERPL-SCNC: 3.9 MMOL/L (ref 3.5–5.3)
PROCALCITONIN SERPL-MCNC: <0.05 NG/ML
PROT SERPL-MCNC: 6.4 G/DL (ref 6.4–8.4)
PROTHROMBIN TIME: 13.1 SECONDS (ref 12.3–15)
RBC # BLD AUTO: 4.77 MILLION/UL (ref 3.81–5.12)
SARS-COV+SARS-COV-2 AG RESP QL IA.RAPID: NEGATIVE
SODIUM SERPL-SCNC: 136 MMOL/L (ref 135–147)
WBC # BLD AUTO: 7.32 THOUSAND/UL (ref 4.31–10.16)

## 2025-03-30 PROCEDURE — 84145 PROCALCITONIN (PCT): CPT | Performed by: EMERGENCY MEDICINE

## 2025-03-30 PROCEDURE — 85379 FIBRIN DEGRADATION QUANT: CPT | Performed by: EMERGENCY MEDICINE

## 2025-03-30 PROCEDURE — 85025 COMPLETE CBC W/AUTO DIFF WBC: CPT | Performed by: EMERGENCY MEDICINE

## 2025-03-30 PROCEDURE — 83880 ASSAY OF NATRIURETIC PEPTIDE: CPT | Performed by: EMERGENCY MEDICINE

## 2025-03-30 PROCEDURE — 99285 EMERGENCY DEPT VISIT HI MDM: CPT

## 2025-03-30 PROCEDURE — 87811 SARS-COV-2 COVID19 W/OPTIC: CPT | Performed by: EMERGENCY MEDICINE

## 2025-03-30 PROCEDURE — 85610 PROTHROMBIN TIME: CPT | Performed by: EMERGENCY MEDICINE

## 2025-03-30 PROCEDURE — 93005 ELECTROCARDIOGRAM TRACING: CPT

## 2025-03-30 PROCEDURE — 85652 RBC SED RATE AUTOMATED: CPT | Performed by: EMERGENCY MEDICINE

## 2025-03-30 PROCEDURE — 99285 EMERGENCY DEPT VISIT HI MDM: CPT | Performed by: EMERGENCY MEDICINE

## 2025-03-30 PROCEDURE — 83735 ASSAY OF MAGNESIUM: CPT | Performed by: EMERGENCY MEDICINE

## 2025-03-30 PROCEDURE — 87804 INFLUENZA ASSAY W/OPTIC: CPT | Performed by: EMERGENCY MEDICINE

## 2025-03-30 PROCEDURE — 86140 C-REACTIVE PROTEIN: CPT | Performed by: EMERGENCY MEDICINE

## 2025-03-30 PROCEDURE — 85730 THROMBOPLASTIN TIME PARTIAL: CPT | Performed by: EMERGENCY MEDICINE

## 2025-03-30 PROCEDURE — 80053 COMPREHEN METABOLIC PANEL: CPT | Performed by: EMERGENCY MEDICINE

## 2025-03-30 PROCEDURE — 36415 COLL VENOUS BLD VENIPUNCTURE: CPT | Performed by: EMERGENCY MEDICINE

## 2025-03-30 RX ORDER — PREDNISONE 20 MG/1
40 TABLET ORAL ONCE
Status: COMPLETED | OUTPATIENT
Start: 2025-03-30 | End: 2025-03-30

## 2025-03-30 RX ORDER — PREDNISONE 20 MG/1
40 TABLET ORAL DAILY
Qty: 10 TABLET | Refills: 0 | Status: SHIPPED | OUTPATIENT
Start: 2025-03-30 | End: 2025-04-03

## 2025-03-30 RX ADMIN — PREDNISONE 40 MG: 20 TABLET ORAL at 15:08

## 2025-03-30 NOTE — ED PROVIDER NOTES
Time reflects when diagnosis was documented in both MDM as applicable and the Disposition within this note       Time User Action Codes Description Comment    3/30/2025  2:32 PM lAeksey Mcodnald Add [R60.0] Pedal edema     3/30/2025  2:56 PM Aleksey Mcdonald Add [J42] Chronic bronchitis (HCC)           ED Disposition       ED Disposition   Discharge    Condition   Stable    Date/Time   Sun Mar 30, 2025  2:32 PM    Comment   Corie Pal discharge to home/self care.                   Assessment & Plan       Medical Decision Making  Differential diagnosis include but not limited to DVT congestive heart failure cellulitis pedal edema venous insufficiency.  Patient is afebrile nontoxic well-appearing clinically and hemodynamically stable in the emergency department normal O2 saturation on room air no respiratory distress.  Workup in the ED reveals no evidence of acute pathology infection or heart failure or DVT.  For now we will recommend short course of steroid for pedal edema possibly due to a inflammatory arthropathy in the foot and ankle as well as to treat the chronic bronchitis and cough and advised supportive care elevation of feet and prompt follow-up with primary physician for further evaluation and treatment and obtain test results. return precautions and anticipatory guidance discussed.      Problems Addressed:  Chronic bronchitis (HCC): acute illness or injury  Pedal edema: acute illness or injury    Amount and/or Complexity of Data Reviewed  Labs: ordered. Decision-making details documented in ED Course.  ECG/medicine tests: ordered and independent interpretation performed. Decision-making details documented in ED Course.    Risk  Prescription drug management.        ED Course as of 03/30/25 1507   Sun Mar 30, 2025   1445 Wells low risk for DVT D-dimer negative no further workup indicated for DVT rule out very low clinical suspicion for DVT based on symptoms and exam.     1506 BNP(!): 134  Noted near normal  no acute chf present clinically.         Medications   predniSONE tablet 40 mg (has no administration in time range)       ED Risk Strat Scores                            SBIRT 22yo+      Flowsheet Row Most Recent Value   Initial Alcohol Screen: US AUDIT-C     1. How often do you have a drink containing alcohol? 0 Filed at: 03/30/2025 1405   2. How many drinks containing alcohol do you have on a typical day you are drinking?  0 Filed at: 03/30/2025 1405   3b. FEMALE Any Age, or MALE 65+: How often do you have 4 or more drinks on one occassion? 0 Filed at: 03/30/2025 1405   Audit-C Score 0 Filed at: 03/30/2025 1405   CANDY: How many times in the past year have you...    Used an illegal drug or used a prescription medication for non-medical reasons? Never Filed at: 03/30/2025 1405              Felton' Criteria for DVT      Flowsheet Row Most Recent Value   Wells' Criteria for DVT    Active cancer Treatment or palliation within 6 months 0 Filed at: 03/30/2025 1444   Bedridden recently >3 days or major surgery within 12 weeks 0 Filed at: 03/30/2025 1444   Calf swelling >3 cm compared to the other leg 0 Filed at: 03/30/2025 1444   Entire leg swollen 0 Filed at: 03/30/2025 1444   Collateral (nonvaricose) superficial veins present 0 Filed at: 03/30/2025 1444   Localized tenderness along the deep venous system 0 Filed at: 03/30/2025 1444   Pitting edema, confined to symptomatic leg 0 Filed at: 03/30/2025 1444   Paralysis, paresis, or recent plaster immobilization of the lower extremity 0 Filed at: 03/30/2025 1444   Previously documented DVT 0 Filed at: 03/30/2025 1444   Alternative diagnosis to DVT as likely or more likely 0 Filed at: 03/30/2025 1444   Felton DVT Critera Score 0 Filed at: 03/30/2025 1444                      History of Present Illness       Chief Complaint   Patient presents with    Leg Swelling     Patient presents to the ED with complaints of bilateral ankle swelling that began over the last few days. She  also reports a cough and diarrhea. History of recent cardiac cath and necrotic lymph nodes per the patient.        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    iris nevus tumor removed      Past Surgical History:   Procedure Laterality Date    BREAST EXCISIONAL BIOPSY Left 1975    age 19; benign lesion    BREAST EXCISIONAL BIOPSY Left 01/04/2000    atypical hyperplasia    BREAST EXCISIONAL BIOPSY Left 01/30/2002    atypical intraductal hyperplasia    BREAST EXCISIONAL BIOPSY Left 04/17/2002    intraductal epithelial hyperplasia and papillomatosis with focal atypia    BREAST EXCISIONAL BIOPSY Left 02/25/2003    foci of atypical intraductal hyperplasia    BREAST EXCISIONAL BIOPSY Left 06/05/2006    fibrocystic change/fibrous mastopathy    BREAST EXCISIONAL BIOPSY Left 02/25/2008    fibrocystic changes    CARDIAC CATHETERIZATION N/A 2/11/2025    Procedure: Left Heart Catherization;  Surgeon: Yocasta Elias DO;  Location: [facility];  Service: Cardiology    CARDIAC CATHETERIZATION N/A 2/11/2025    Procedure: Cardiac Coronary Angiogram;  Surgeon: Yocasta Elias DO;  Location: [facility];  Service: Cardiology    DILATION AND CURETTAGE OF UTERUS      EYE SURGERY Left 12/21/2006    HYSTERECTOMY  02/2000    OOPHORECTOMY Bilateral 2013    approximately    REFRACTIVE SURGERY      US GUIDANCE BREAST BIOPSY LEFT EACH ADDITIONAL Left 04/28/2008    benign breast tissue with stromal fibrosis and sclerosing adenosis    US GUIDED BREAST BIOPSY LEFT COMPLETE Left 04/28/2008    benign breast tissue with stromal fibrosis and sclerosing adenosis      Family History   Problem Relation Age of Onset    Alzheimer's disease Mother     No Known Problems Father     Breast cancer Sister 42    No Known  Problems Sister     No Known Problems Sister     No Known Problems Daughter     Breast cancer Maternal Grandmother 38    No Known Problems Maternal Grandfather     No Known Problems Paternal Grandmother     No Known Problems Paternal Grandfather     No Known Problems Maternal Aunt     No Known Problems Maternal Aunt     Breast cancer Paternal Aunt         82    Breast cancer Cousin 44    Breast cancer Cousin 45    Breast cancer additional onset Neg Hx     BRCA2 Positive Neg Hx     BRCA2 Negative Neg Hx     BRCA1 Positive Neg Hx     BRCA1 Negative Neg Hx     BRCA 1/2 Neg Hx     Colon cancer Neg Hx     Ovarian cancer Neg Hx     Endometrial cancer Neg Hx       Social History     Tobacco Use    Smoking status: Every Day     Current packs/day: 0.25     Average packs/day: 0.3 packs/day for 25.0 years (6.3 ttl pk-yrs)     Types: Cigarettes    Smokeless tobacco: Never   Vaping Use    Vaping status: Every Day   Substance Use Topics    Alcohol use: Yes     Alcohol/week: 3.0 standard drinks of alcohol     Types: 3 Glasses of wine per week     Comment: socially    Drug use: Not Currently      E-Cigarette/Vaping    E-Cigarette Use Current Every Day User       E-Cigarette/Vaping Substances      I have reviewed and agree with the history as documented.     Patient is a 69-year-old female hx of htn gerd hld presents emergency department complaint of bilateral ankle swelling started about 2 or 3 days ago no significant pain redness or fevers or chills.  Patient denies any chest pain or shortness of breath has had a chronic cough and had a recent cardiac catheterization for an elevated troponin which was negative for any blockages.  No swelling in the calf or thigh or pain in these regions.        History provided by:  Patient      Review of Systems   Constitutional:  Negative for fever.   Respiratory:  Positive for cough. Negative for shortness of breath.    Cardiovascular:  Positive for leg swelling. Negative for chest pain.    Gastrointestinal:  Negative for nausea and vomiting.   Musculoskeletal:         Bilateral foot/ankle swelling   All other systems reviewed and are negative.          Objective       ED Triage Vitals [03/30/25 1403]   Temperature Pulse Blood Pressure Respirations SpO2 Patient Position - Orthostatic VS   97.9 °F (36.6 °C) 77 159/78 16 95 % Sitting      Temp Source Heart Rate Source BP Location FiO2 (%) Pain Score    Temporal Monitor Right arm -- No Pain      Vitals      Date and Time Temp Pulse SpO2 Resp BP Pain Score FACES Pain Rating User   03/30/25 1500 -- 78 96 % 32 129/59 -- -- AR   03/30/25 1445 -- 83 98 % 26 145/63 -- -- AR   03/30/25 1430 -- 76 97 % 21 131/55 3 -- AR   03/30/25 1415 -- 74 98 % 28 147/66 -- -- AR   03/30/25 1403 97.9 °F (36.6 °C) 77 95 % 16 159/78 No Pain -- RR            Physical Exam  Vitals and nursing note reviewed.   Constitutional:       General: She is not in acute distress.     Appearance: Normal appearance.   HENT:      Head: Normocephalic and atraumatic.      Nose: Nose normal.   Eyes:      Conjunctiva/sclera: Conjunctivae normal.   Pulmonary:      Effort: Pulmonary effort is normal. No respiratory distress.   Musculoskeletal:      Right upper leg: No swelling or tenderness.      Left upper leg: No swelling or tenderness.      Right lower leg: No tenderness. No edema.      Left lower leg: No tenderness. No edema.      Right ankle: Swelling present. Tenderness present.      Left ankle: Swelling present. Tenderness present.      Right foot: Normal capillary refill. Swelling and tenderness present. Normal pulse.      Left foot: Normal capillary refill. Swelling and tenderness present. Normal pulse.      Comments: Neurovascular intact distal bilateral lower extremities   Skin:     General: Skin is dry.   Neurological:      General: No focal deficit present.      Mental Status: She is alert and oriented to person, place, and time.         Results Reviewed       Procedure Component Value  Units Date/Time    B-Type Natriuretic Peptide(BNP) [[id number]]  (Abnormal) Collected: 03/30/25 1419    Lab Status: Final result Specimen: Blood from Arm, Left Updated: 03/30/25 1456      pg/mL     Procalcitonin [[id number]]  (Normal) Collected: 03/30/25 1419    Lab Status: Final result Specimen: Blood from Arm, Left Updated: 03/30/25 1452     Procalcitonin <0.05 ng/ml     Comprehensive metabolic panel [[id number]]  (Abnormal) Collected: 03/30/25 1419    Lab Status: Final result Specimen: Blood from Arm, Left Updated: 03/30/25 1446     Sodium 136 mmol/L      Potassium 3.9 mmol/L      Chloride 101 mmol/L      CO2 29 mmol/L      ANION GAP 6 mmol/L      BUN 7 mg/dL      Creatinine 0.43 mg/dL      Glucose 106 mg/dL      Calcium 8.7 mg/dL      AST 24 U/L      ALT 20 U/L      Alkaline Phosphatase 83 U/L      Total Protein 6.4 g/dL      Albumin 3.9 g/dL      Total Bilirubin 0.76 mg/dL      eGFR 104 ml/min/1.73sq m     Narrative:      National Kid[employer] Disease Foundation guidelines for Chronic Kidney Disease (CKD):     Stage 1 with normal or high GFR (GFR > 90 mL/min/1.73 square meters)    Stage 2 Mild CKD (GFR = 60-89 mL/min/1.73 square meters)    Stage 3A Moderate CKD (GFR = 45-59 mL/min/1.73 square meters)    Stage 3B Moderate CKD (GFR = 30-44 mL/min/1.73 square meters)    Stage 4 Severe CKD (GFR = 15-29 mL/min/1.73 square meters)    Stage 5 End Stage CKD (GFR <15 mL/min/1.73 square meters)  Note: GFR calculation is accurate only with a steady state creatinine    Magnesium [[id number]]  (Normal) Collected: 03/30/25 1419    Lab Status: Final result Specimen: Blood from Arm, Left Updated: 03/30/25 1446     Magnesium 2.0 mg/dL     C-reactive protein [[id number]]  (Normal) Collected: 03/30/25 1419    Lab Status: Final result Specimen: Blood from Arm, Left Updated: 03/30/25 1446     CRP 1.3 mg/L     FLU/COVID Rapid Antigen (30 min. TAT) - Preferred screening test in ED [[id number]]  (Normal) Collected: 03/30/25 1419    Lab  Status: Final result Specimen: Nares from Nose Updated: 03/30/25 1442     SARS COV Rapid Antigen Negative     Influenza A Rapid Antigen Negative     Influenza B Rapid Antigen Negative    Narrative:      This test has been performed using the [employer] Jess 2 FLU+SARS Antigen test under the Emergency Use Authorization (EUA). This test has been validated by the  and verified by the performing laboratory. The Jess uses lateral flow immunofluorescent sandwich assay to detect SARS-COV, Influenza A and Influenza B Antigen.     The [employer] Jess 2 SARS Antigen test does not differentiate between SARS-CoV and SARS-CoV-2.     Negative results are presumptive and may be confirmed with a molecular assay, if necessary, for patient management. Negative results do not rule out SARS-CoV-2 or influenza infection and should not be used as the sole basis for treatment or patient management decisions. A negative test result may occur if the level of antigen in a sample is below the limit of detection of this test.     Positive results are indicative of the presence of viral antigens, but do not rule out bacterial infection or co-infection with other viruses.     All test results should be used as an adjunct to clinical observations and other information available to the provider.    FOR PEDIATRIC PATIENTS - copy/paste COVID Guidelines URL to browser: [email or web address]    D-Dimer [[id number]]  (Normal) Collected: 03/30/25 1419    Lab Status: Final result Specimen: Blood from Arm, Left Updated: 03/30/25 1440     D-Dimer, Quant 0.47 ug/ml FEU     Narrative:      In the evaluation for possible pulmonary embolism, in the appropriate (Well's Score of 4 or less) patient, the age adjusted d-dimer cutoff for this patient can be calculated as:    Age x 0.01 (in ug/mL) for Age-adjusted D-dimer exclusion threshold for a patient over 50 years.    Protime-INR [[id number]]  (Normal)  Collected: 03/30/25 1419    Lab Status: Final result Specimen: Blood from Arm, Left Updated: 03/30/25 1439     Protime 13.1 seconds      INR 0.95    Narrative:      INR Therapeutic Range    Indication                                             INR Range      Atrial Fibrillation                                               2.0-3.0  Hypercoagulable State                                    2.0.2.3  Left Ventricular Asist Device                            2.0-3.0  Mechanical Heart Valve                                  -    Aortic(with afib, MI, embolism, HF, LA enlargement,    and/or coagulopathy)                                     2.0-3.0 (2.5-3.5)     Mitral                                                             2.5-3.5  Prosthetic/Bioprosthetic Heart Valve               2.0-3.0  Venous thromboembolism (VTE: VT, PE        2.0-3.0    APTT [[id number]]  (Normal) Collected: 03/30/25 1419    Lab Status: Final result Specimen: Blood from Arm, Left Updated: 03/30/25 1439     PTT 25 seconds     Sedimentation rate, automated [[id number]]  (Normal) Collected: 03/30/25 1419    Lab Status: Final result Specimen: Blood from Arm, Left Updated: 03/30/25 1429     Sed Rate 6 mm/hour     CBC and differential [[id number]]  (Abnormal) Collected: 03/30/25 1419    Lab Status: Final result Specimen: Blood from Arm, Left Updated: 03/30/25 1426     WBC 7.32 Thousand/uL      RBC 4.77 Million/uL      Hemoglobin 15.3 g/dL      Hematocrit 46.7 %      MCV 98 fL      MCH 32.1 pg      MCHC 32.8 g/dL      RDW 13.1 %      MPV 8.8 fL      Platelets 304 Thousands/uL      nRBC 0 /100 WBCs      Segmented % 79 %      Immature Grans % 0 %      Lymphocytes % 8 %      Monocytes % 8 %      Eosinophils Relative 4 %      Basophils Relative 1 %      Absolute Neutrophils 5.69 Thousands/µL      Absolute Immature Grans 0.03 Thousand/uL      Absolute Lymphocytes 0.61 Thousands/µL      Absolute Monocytes 0.61 Thousand/µL      Eosinophils Absolute 0.32  Thousand/µL      Basophils Absolute 0.06 Thousands/µL             No orders to display       ECG 12 Lead Documentation Only    Date/Time: 3/30/2025 2:13 PM    Performed by: Aleksey Mcdonald DO  Authorized by: Aleksey Mcdonald DO    ECG reviewed by me, the ED Provider: yes    Patient location:  ED  Previous ECG:     Comparison to cardiac monitor: Yes    Quality:     Tracing quality:  Limited by artifact  Rate:     ECG rate:  78    ECG rate assessment: normal    Rhythm:     Rhythm: sinus rhythm    QRS:     QRS axis:  Normal    QRS intervals:  Normal  Conduction:     Conduction: normal    ST segments:     ST segments:  Non-specific  T waves:     T waves: non-specific        ED Medication and Procedure Management   Prior to Admission Medications   Prescriptions Last Dose Informant Patient Reported? Taking?   EPINEPHrine (EPIPEN) 0.3 mg/0.3 mL SOAJ   Yes No   Sig: Inject as directed   albuterol (Proventil HFA) 90 mcg/act inhaler   No No   Sig: Inhale 2 puffs every 6 (six) hours as needed for wheezing   aspirin 81 mg chewable tablet   No No   Sig: Chew 1 tablet (81 mg total) daily   atorvastatin (LIPITOR) 80 mg tablet   No No   Sig: Take 1 tablet (80 mg total) by mouth daily with dinner   benzonatate (TESSALON PERLES) 100 mg capsule   Yes No   Sig: Take 100 mg by mouth   brimonidine (ALPHAGAN P) 0.15 % ophthalmic solution   Yes No   Sig: Apply to eye   brimonidine-timolol (COMBIGAN) 0.2-0.5 %   Yes No   Sig: Apply to eye   budesonide-formoterol (SYMBICORT) 160-4.5 mcg/act inhaler   No No   Sig: Inhale 2 puffs 2 (two) times a day Rinse mouth after use.   carvedilol (COREG) 6.25 mg tablet   Yes No   Sig: Take 6.25 mg by mouth 2 (two) times a day   fluticasone (FLONASE) 50 mcg/act nasal spray   No No   Si spray into each nostril daily   ipratropium-albuterol (DUO-NEB) 0.5-2.5 mg/3 mL nebulizer solution   Yes No   Sig: Inhale 3 mL 4 (four) times a day   loratadine (CLARITIN) 10 mg tablet   Yes No   Sig: Take 10 mg by mouth  daily   losartan (COZAAR) 25 mg tablet   No No   Sig: Take 1 tablet (25 mg total) by mouth daily   nicotine (NICODERM CQ) 14 mg/24hr TD 24 hr patch   No No   Sig: Place 1 patch on the skin over 24 hours daily as needed (nicotine replacement)   prednisoLONE acetate (PRED MILD) 0.12 % ophthalmic suspension   Yes No   Si drop 4 (four) times a day   sucralfate (CARAFATE) 1 g tablet   Yes No   Sig: Take 1 g by mouth      Facility-Administered Medications: None     Patient's Medications   Discharge Prescriptions    PREDNISONE 20 MG TABLET    Take 2 tablets (40 mg total) by mouth daily for 5 days       Start Date: 3/30/2025 End Date: 2025       Order Dose: 40 mg       Quantity: 10 tablet    Refills: 0     No discharge procedures on file.  ED SEPSIS DOCUMENTATION   Time reflects when diagnosis was documented in both MDM as applicable and the Disposition within this note       Time User Action Codes Description Comment    3/30/2025  2:32 PM Aleksey Mcdonald [R60.0] Pedal edema     3/30/2025  2:56 PM Aleksey Mcdonald [J42] Chronic bronchitis (HCC)                      Aleksey Mcdonald DO  25 [phone]

## 2025-03-31 LAB
ATRIAL RATE: 78 BPM
P AXIS: 39 DEGREES
PR INTERVAL: 134 MS
QRS AXIS: 81 DEGREES
QRSD INTERVAL: 74 MS
QT INTERVAL: 416 MS
QTC INTERVAL: 474 MS
T WAVE AXIS: 71 DEGREES
VENTRICULAR RATE: 78 BPM

## 2025-03-31 NOTE — PRE-PROCEDURE INSTRUCTIONS
Pre-Surgery Instructions:   Medication Instructions    albuterol (Proventil HFA) 90 mcg/act inhaler Uses PRN- OK to take day of surgery    aspirin 81 mg chewable tablet LD taken 3/ 20/25    atorvastatin (LIPITOR) 80 mg tablet Take night before surgery    brimonidine-timolol (COMBIGAN) 0.2-0.5 % Take day of surgery.    budesonide-formoterol (SYMBICORT) 160-4.5 mcg/act inhaler Take day of surgery.    carvedilol (COREG) 6.25 mg tablet Take day of surgery.    ipratropium-albuterol (DUO-NEB) 0.5-2.5 mg/3 mL nebulizer solution Take day of surgery.    loratadine (CLARITIN) 10 mg tablet Take day of surgery.    losartan (COZAAR) 25 mg tablet Hold day of surgery.    prednisoLONE acetate (PRED MILD) 0.12 % ophthalmic suspension Take day of surgery.    predniSONE 20 mg tablet Take day of surgery.     sucralfate (CARAFATE) 1 g tablet Hold day of surgery.   Medication instructions for day of surgery reviewed. Please take all instructed medications with only a sip of water.       You will receive a call one business day prior to surgery with an arrival time and hospital directions. If your surgery is scheduled on a Monday, the hospital will be calling you on the Friday prior to your surgery. If you have not heard from anyone by 8pm, please call the hospital supervisor through the hospital  at [phone]. ([place] [phone] or [place] [phone]).    Do not eat or drink anything after midnight the night before your surgery, including candy, mints, lifesavers, or chewing gum. Do not drink alcohol 24hrs before your surgery. Try not to smoke at least 24hrs before your surgery.       Follow the pre surgery showering instructions as listed in the “My Surgical Experience Booklet” or otherwise provided by your surgeon's office. Do not use a blade to shave the surgical area 1 week before surgery. It is okay to use a clean electric clippers up to 24 hours before surgery. Do not apply any lotions, creams, including makeup,  cologne, deodorant, or perfumes after showering on the day of your surgery. Do not use dry shampoo, hair spray, hair gel, or any type of hair products.     No contact lenses, eye make-up, or artificial eyelashes. Remove nail polish, including gel polish, and any artificial, gel, or acrylic nails if possible. Remove all jewelry including rings and body piercing jewelry.     Wear causal clothing that is easy to take on and off. Consider your type of surgery.    Keep any valuables, jewelry, piercings at home. Please bring any specially ordered equipment (sling, braces) if indicated.    Arrange for a responsible person to drive you to and from the hospital on the day of your surgery. Please confirm the visitor policy for the day of your procedure when you receive your phone call with an arrival time.     Call the surgeon's office with any new illnesses, exposures, or additional questions prior to surgery.    Please reference your “My Surgical Experience Booklet” for additional information to prepare for your upcoming surgery.

## 2025-04-03 ENCOUNTER — TELEPHONE (OUTPATIENT)
Age: 70
End: 2025-04-03

## 2025-04-03 ENCOUNTER — OFFICE VISIT (OUTPATIENT)
Dept: PULMONOLOGY | Facility: CLINIC | Age: 70
End: 2025-04-03
Payer: MEDICARE

## 2025-04-03 VITALS
HEART RATE: 68 BPM | OXYGEN SATURATION: 98 % | HEIGHT: 67 IN | SYSTOLIC BLOOD PRESSURE: 130 MMHG | TEMPERATURE: 98.9 F | DIASTOLIC BLOOD PRESSURE: 76 MMHG | BODY MASS INDEX: 15.85 KG/M2 | WEIGHT: 101 LBS

## 2025-04-03 DIAGNOSIS — Z72.0 TOBACCO USE: Primary | ICD-10-CM

## 2025-04-03 DIAGNOSIS — R91.8 ABNORMAL CT SCAN OF LUNG: ICD-10-CM

## 2025-04-03 DIAGNOSIS — R05.3 CHRONIC COUGH: ICD-10-CM

## 2025-04-03 DIAGNOSIS — J42 CHRONIC BRONCHITIS, UNSPECIFIED CHRONIC BRONCHITIS TYPE (HCC): ICD-10-CM

## 2025-04-03 DIAGNOSIS — R59.0 MEDIASTINAL LYMPHADENOPATHY: ICD-10-CM

## 2025-04-03 PROCEDURE — G2211 COMPLEX E/M VISIT ADD ON: HCPCS | Performed by: NURSE PRACTITIONER

## 2025-04-03 PROCEDURE — 99214 OFFICE O/P EST MOD 30 MIN: CPT | Performed by: NURSE PRACTITIONER

## 2025-04-03 RX ORDER — IPRATROPIUM BROMIDE 42 UG/1
2 SPRAY, METERED NASAL
COMMUNITY
Start: 2025-04-01

## 2025-04-03 RX ORDER — ACETAMINOPHEN 500 MG
1 TABLET ORAL EVERY 6 HOURS PRN
COMMUNITY

## 2025-04-03 RX ORDER — PREDNISONE 10 MG/1
TABLET ORAL
Qty: 30 TABLET | Refills: 0 | Status: SHIPPED | OUTPATIENT
Start: 2025-04-03 | End: 2025-04-14

## 2025-04-03 RX ORDER — IPRATROPIUM BROMIDE AND ALBUTEROL SULFATE 2.5; .5 MG/3ML; MG/3ML
3 SOLUTION RESPIRATORY (INHALATION) 4 TIMES DAILY
Qty: 360 ML | Refills: 3 | Status: SHIPPED | OUTPATIENT
Start: 2025-04-03

## 2025-04-03 NOTE — TELEPHONE ENCOUNTER
Patient is calling to let us know the new prednisone prescription was not sent to her pharmacy. She states Fara was going to order a stronger dose for her.      Please advise.

## 2025-04-03 NOTE — ASSESSMENT & PLAN NOTE
Discussed with Alyssa she is appropriate for EBUS given high risk as longterm smoker and ongoing pulmonary symptoms despite recent clear CXR.  Orders:    Ambulatory referral to Pulmonology    Complete PFT with post bronchodilator; Future

## 2025-04-03 NOTE — ASSESSMENT & PLAN NOTE
Unclear to me if her cough is due to COPD vs other process. She does have trace wheezing on exam and productive cough leaving her breathless. Prior CXR did demonstrate hyperinflation consistent with emphysema.  Trial of Breztri 2 puffs AM and PM - to call if Breztri is helpful and I will prescribe or find formulary alternative.  Schedule PFT  DuoNeb via nebulizer up to 4x per day max, if needed for active symptoms  Mucinex twice per day (OTC)  Pred taper    Orders:    ipratropium-albuterol (DUO-NEB) 0.5-2.5 mg/3 mL nebulizer solution; Take 3 mL by nebulization 4 (four) times a day

## 2025-04-03 NOTE — ASSESSMENT & PLAN NOTE
Mediastinal and hilar lymphadenopathy concerning for malignancy, with no obvious sign of pulmonary primary. Also has anterior cervical lymphadenopathy, some of which demonstrated necrosis. She is anticipating excision / biopsy on 4/9/25 with Dr Carter for right anterior cervical node. I do feel she should still consider EBUS.  Orders:    Ambulatory referral to Pulmonology    Complete PFT with post bronchodilator; Future

## 2025-04-03 NOTE — ASSESSMENT & PLAN NOTE
Ongoing smoking; she has cut down to 1 per day and working on complete cessation. In total her smoking history is less than 15 pack years.

## 2025-04-03 NOTE — PROGRESS NOTES
Follow-up  Visit - Pulmonary Medicine   Name: Corie Pal      : 1955      MRN: [id number]  Encounter Provider: KRISTI Murillo  Encounter Date: 4/3/2025   Encounter department: [facility] [place]  :  Assessment & Plan  Mediastinal lymphadenopathy  Discussed with Alyssa she is appropriate for EBUS given high risk as longterm smoker and ongoing pulmonary symptoms despite recent clear CXR.  Orders:    Ambulatory referral to Pulmonology    Complete PFT with post bronchodilator; Future    Abnormal CT scan of lung  Mediastinal and hilar lymphadenopathy concerning for malignancy, with no obvious sign of pulmonary primary. Also has anterior cervical lymphadenopathy, some of which demonstrated necrosis. She is anticipating excision / biopsy on 25 with Dr Carter for right anterior cervical node. I do feel she should still consider EBUS.  Orders:    Ambulatory referral to Pulmonology    Complete PFT with post bronchodilator; Future    Tobacco use  Ongoing smoking; she has cut down to 1 per day and working on complete cessation. In total her smoking history is less than 15 pack years.       Chronic cough  Unclear to me if her cough is due to COPD vs other process. She does have trace wheezing on exam and productive cough leaving her breathless. Prior CXR did demonstrate hyperinflation consistent with emphysema.  Trial of Breztri 2 puffs AM and PM - to call if Breztri is helpful and I will prescribe or find formulary alternative.  Schedule PFT  DuoNeb via nebulizer up to 4x per day max, if needed for active symptoms  Mucinex twice per day (OTC)  Pred taper    Orders:    ipratropium-albuterol (DUO-NEB) 0.5-2.5 mg/3 mL nebulizer solution; Take 3 mL by nebulization 4 (four) times a day      Return in about 6 weeks (around 5/15/2025).    History of Present Illness   Corie Pal is a 69 y.o. female who presents for follow up after recent hospitalization 2/10/25-25; she  was admitted with episodes of nausea and palpitations and found to have elevated troponins; she underwent left heart cath and was found to have non-MI troponin elevation. Incidentally CT chest identified nonspecific pulmonary infiltrates and right cervical adenopathy and was seen by Pulmonary at bedside; she was asked to follow up as outpatient for these issues.    She has frequent cough for months now. Initially sinus infection in the fall progressed to pneumonia in her chest that took months to clear and she has had many rounds of antibiotics. She is currently on steroids to help reduce swelling in her ankles - does feel the steroid helping her cough but is worried symptoms will intensify when the steroid ends. She coughs to the point of gagging / dry heaving and nothing can relieve it. She has no fever or chills but does report episodic sweating that can occur randomly. She is so fatigued by coughing jags that she is frequently on the bathroom floor waiting to recover her breath and energy.     Pulmonary hx: Denies prior history of asthma or COPD. Never had any inhaler until recently PCP started her on nebulizer therapy. She is following with ENT DR Carter and anticipating biopsy / lymph node excision on a right anterior cervical node Wednesday.   She is taking Symbicort BID and DuoNeb via nebulizer     Patient lives in [place], [place] with her spouse.  Work hx: [employer]  Highest level of education is HS graduate.    Tobacco Use: down to 1 cig/day. Age 16, pack per week  Drug use: Denies  Alcohol use: none recently    Functional status: independent    Review of Systems  Please note that a 14-point review of systems was performed to include Constitutional, HEENT, Respiratory, CVS, GI, , Musculoskeletal, Integumentary, Neurologic, Rheumatologic, Endocrinologic, Psychiatric, Lymphatic, and Hematologic/Oncologic systems were reviewed and are negative unless otherwise stated in HPI. Positive and negative  findings pertinent to this evaluation are incorporated into the history of present illness.     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    iris nevus tumor removed    Pneumonia 11/2024    ED 2/10/2025    PONV (postoperative nausea and vomiting)        Current Outpatient Medications on File Prior to Visit   Medication Sig Dispense Refill    acetaminophen (TYLENOL) 500 mg tablet Take 1 tablet by mouth every 6 (six) hours as needed      albuterol (Proventil HFA) 90 mcg/act inhaler Inhale 2 puffs every 6 (six) hours as needed for wheezing 6.7 g 5    atorvastatin (LIPITOR) 80 mg tablet Take 1 tablet (80 mg total) by mouth daily with dinner 30 tablet 0    brimonidine (ALPHAGAN P) 0.15 % ophthalmic solution Apply to eye      carvedilol (COREG) 6.25 mg tablet Take 6.25 mg by mouth 2 (two) times a day      EPINEPHrine (EPIPEN) 0.3 mg/0.3 mL SOAJ Inject as directed      ipratropium (ATROVENT) 0.06 % nasal spray 2 sprays into each nostril      loratadine (CLARITIN) 10 mg tablet Take 10 mg by mouth daily      losartan (COZAAR) 25 mg tablet Take 1 tablet (25 mg total) by mouth daily 30 tablet 0    nicotine (NICODERM CQ) 14 mg/24hr TD 24 hr patch Place 1 patch on the skin over 24 hours daily as needed (nicotine replacement) 30 patch 0    prednisoLONE acetate (PRED MILD) 0.12 % ophthalmic suspension Administer 1 drop into the left eye 4 (four) times a day      sucralfate (CARAFATE) 1 g tablet Take 1 g by mouth 4 (four) times a day      [DISCONTINUED] budesonide-formoterol (SYMBICORT) 160-4.5 mcg/act inhaler Inhale 2 puffs 2 (two) times a day Rinse mouth after use. 10.2 g 0    [DISCONTINUED] ipratropium-albuterol (DUO-NEB) 0.5-2.5 mg/3 mL  "nebulizer solution Inhale 3 mL 4 (four) times a day      [DISCONTINUED] predniSONE 20 mg tablet Take 2 tablets (40 mg total) by mouth daily for 5 days 10 tablet 0    aspirin 81 mg chewable tablet Chew 1 tablet (81 mg total) daily (Patient not taking: Reported on 4/3/2025) 30 tablet 0    brimonidine-timolol (COMBIGAN) 0.2-0.5 % Administer to both eyes every 12 (twelve) hours      fluticasone (FLONASE) 50 mcg/act nasal spray 1 spray into each nostril daily (Patient not taking: Reported on 4/3/2025) 9.9 mL 0    [DISCONTINUED] benzonatate (TESSALON PERLES) 100 mg capsule Take 100 mg by mouth 2 (two) times a day as needed (Patient not taking: Reported on 4/3/2025)       No current facility-administered medications on file prior to visit.      Social History     Tobacco Use    Smoking status: Former     Current packs/day: 0.00     Average packs/day: 0.3 packs/day for 52.1 years (13.0 ttl pk-yrs)     Types: Cigarettes     Start date:      Quit date: 2/10/2025     Years since quittin.1    Smokeless tobacco: Never   Vaping Use    Vaping status: Some Days    Substances: Nicotine   Substance and Sexual Activity    Alcohol use: Not Currently     Alcohol/week: 3.0 standard drinks of alcohol     Types: 3 Glasses of wine per week     Comment: socially    Drug use: Not Currently    Sexual activity: Yes        Medical History Reviewed by provider this encounter:  Tobacco  Allergies  Meds  Problems  Med Hx  Surg Hx  Fam Hx     .    Objective   /76 (BP Location: Left arm, Patient Position: Sitting, Cuff Size: Standard)   Pulse 68   Temp 98.9 °F (37.2 °C) (Temporal)   Ht 5' 7\" (1.702 m)   Wt 45.8 kg (101 lb)   SpO2 98%   BMI 15.82 kg/m²     Physical Exam  Vitals reviewed.   Constitutional:       Appearance: Normal appearance. She is underweight.   HENT:      Nose: Nose normal.      Mouth/Throat:      Mouth: Mucous membranes are moist.      Pharynx: Oropharynx is clear.   Cardiovascular:      Rate and Rhythm: " "Normal rate and regular rhythm.      Pulses: Normal pulses.      Heart sounds: Normal heart sounds.   Pulmonary:      Effort: Pulmonary effort is normal.      Breath sounds: Wheezing present.   Musculoskeletal:         General: Normal range of motion.   Skin:     General: Skin is warm.      Capillary Refill: Capillary refill takes less than 2 seconds.   Neurological:      General: No focal deficit present.      Mental Status: She is alert and oriented to person, place, and time.   Psychiatric:         Mood and Affect: Mood normal.         Behavior: Behavior normal.         Diagnostic Data:  Labs: I personally reviewed the most recent laboratory data pertinent to today's visit.  Lab Results   Component Value Date    WBC 7.32 03/30/2025    HGB 15.3 03/30/2025    HCT 46.7 (H) 03/30/2025    MCV 98 03/30/2025     03/30/2025    EOSPCT 4 03/30/2025    EOSABS 0.32 03/30/2025    MONOPCT 8 03/30/2025     Lab Results   Component Value Date    CALCIUM 8.7 03/30/2025    K 3.9 03/30/2025    CO2 29 03/30/2025     03/30/2025    BUN 7 03/30/2025    CREATININE 0.43 (L) 03/30/2025     No results found for: \"IGE\", \"RAST\"  Lab Results   Component Value Date    ALT 20 03/30/2025    AST 24 03/30/2025    ALKPHOS 83 03/30/2025         Radiology results:  Radiology Results Review: I have reviewed radiology reports from PACS including: chest xray, CT chest, CT neck, and Echocardiogram.  CXR 3/14/25  No focal airspace consolidation.  Suspected emphysema.  There is no pleural effusion.  The pulmonary vasculature and cardiomediastinal silhouette are within normal limits.  No acute osseous finding.     CT soft tissue neck w contrast 3/7/25  Multiple bilateral submandibular, jugular chain, posterior triangle and supraclavicular lymph nodes, several of which are pathologically enlarged and demonstrate central necrosis. There is no suspicious neck mucosal lesion.     Partially imaged mediastinal and bilateral hilar as well as axillary " "adenopathy better delineated on the recent CT chest examination 2/10/2025. Further work-up for adenopathy is recommended including tissue sampling.    CT chest 3/10/25  IMPRESSION:     1.  Bilateral airspace opacities suspicious for multifocal pneumonia. Recommend follow-up chest CT in 2 to 3 months to confirm resolution.  2.  Mediastinal lymphadenopathy including 2.3 cm subcarinal lymph node. This may be reactive to the above. Attention on recommended follow-up.    ECHO 2/10/25    Left Ventricle: Left ventricular cavity size is normal. Wall thickness is mildly increased. The left ventricular ejection fraction is 65%. Systolic function is normal. Basal inferolateral wall appears hypo to akinetic.    Mitral Valve: There is mild annular calcification. There is mild to moderate regurgitation with an eccentrically directed jet.    Tricuspid Valve: There is mild regurgitation.  RVSP could not be accurately estimated due to inadequate Doppler signal.    No comparison study available.       PFT/spirometry results:  No results found for: \"FEV1\", \"FVC\", \"DHW6MVJ\", \"TLC\", \"DLCO\"       FaraKRISTI Gray      "

## 2025-04-07 ENCOUNTER — ANESTHESIA EVENT (OUTPATIENT)
Dept: PERIOP | Facility: HOSPITAL | Age: 70
End: 2025-04-07
Payer: MEDICARE

## 2025-04-07 NOTE — ANESTHESIA PREPROCEDURE EVALUATION
Procedure:  RIGHT LYMPH NODE  BIOPSY WITH FROZEN SECTION ANALYSIS (Right: Neck)    Relevant Problems   CARDIO   (+) Essential hypertension   (+) Mixed hyperlipidemia      Other   (+) Mediastinal lymphadenopathy      Ponv    Htn    Vaping      Physical Exam    Airway    Mallampati score: II  TM Distance: >3 FB  Neck ROM: full     Dental   No notable dental hx     Cardiovascular  Cardiovascular exam normal    Pulmonary  Pulmonary exam normal     Other Findings  post-pubertal.    Normal sinus rhythm  Nonspecific T wave abnormality  Abnormal ECG  When compared with ECG of 10-Feb-2025 13:09,  Questionable change in QRS axis  Nonspecific T wave abnormality no longer evident in Inferior leads  Nonspecific T wave abnormality, worse in Lateral leads  QT has shortened       Left Ventricle: Left ventricular cavity size is normal. Wall thickness is mildly increased. The left ventricular ejection fraction is 65%. Systolic function is normal. Basal inferolateral wall appears hypo to akinetic.    Mitral Valve: There is mild annular calcification. There is mild to moderate regurgitation with an eccentrically directed jet.    Tricuspid Valve: There is mild regurgitation.  RVSP could not be accurately estimated due to inadequate Doppler signal.    No comparison study available.  Anesthesia Plan  ASA Score- 3     Anesthesia Type- general with ASA Monitors.         Additional Monitors:     Airway Plan: ETT.           Plan Factors-Exercise tolerance (METS): >4 METS.    Chart reviewed.  Imaging results reviewed. Existing labs reviewed. Patient summary reviewed.    Patient is not a current smoker.      Obstructive sleep apnea risk education given perioperatively.        Induction- intravenous.    Postoperative Plan-     Perioperative Resuscitation Plan - Level 1 - Full Code.       Informed Consent- Anesthetic plan and risks discussed with patient.  I personally reviewed this patient with the CRNA. Discussed and agreed on the Anesthesia  Plan with the CRNA..      NPO Status:  No vitals data found for the desired time range.

## 2025-04-09 ENCOUNTER — HOSPITAL ENCOUNTER (OUTPATIENT)
Facility: HOSPITAL | Age: 70
Setting detail: OUTPATIENT SURGERY
Discharge: HOME | End: 2025-04-09
Attending: OTOLARYNGOLOGY | Admitting: OTOLARYNGOLOGY
Payer: MEDICARE

## 2025-04-09 ENCOUNTER — ANESTHESIA (OUTPATIENT)
Dept: PERIOP | Facility: HOSPITAL | Age: 70
End: 2025-04-09
Payer: MEDICARE

## 2025-04-09 VITALS
OXYGEN SATURATION: 94 % | RESPIRATION RATE: 22 BRPM | SYSTOLIC BLOOD PRESSURE: 138 MMHG | WEIGHT: 100 LBS | HEART RATE: 75 BPM | DIASTOLIC BLOOD PRESSURE: 60 MMHG | HEIGHT: 67 IN | BODY MASS INDEX: 15.7 KG/M2 | TEMPERATURE: 97.5 F

## 2025-04-09 DIAGNOSIS — R59.0 LOCALIZED ENLARGED LYMPH NODES: ICD-10-CM

## 2025-04-09 DIAGNOSIS — R22.1 LOCALIZED SWELLING, MASS AND LUMP, NECK: ICD-10-CM

## 2025-04-09 DIAGNOSIS — R49.0 DYSPHONIA: ICD-10-CM

## 2025-04-09 PROCEDURE — 88307 TISSUE EXAM BY PATHOLOGIST: CPT | Performed by: PATHOLOGY

## 2025-04-09 PROCEDURE — 88342 IMHCHEM/IMCYTCHM 1ST ANTB: CPT | Performed by: PATHOLOGY

## 2025-04-09 PROCEDURE — 88332 PATH CONSLTJ SURG EA ADD BLK: CPT | Performed by: PATHOLOGY

## 2025-04-09 PROCEDURE — 88341 IMHCHEM/IMCYTCHM EA ADD ANTB: CPT | Performed by: PATHOLOGY

## 2025-04-09 PROCEDURE — 88331 PATH CONSLTJ SURG 1 BLK 1SPC: CPT | Performed by: PATHOLOGY

## 2025-04-09 PROCEDURE — 88344 IMHCHEM/IMCYTCHM EA MLT ANTB: CPT | Performed by: PATHOLOGY

## 2025-04-09 RX ORDER — ACETAMINOPHEN 325 MG/1
975 TABLET ORAL ONCE
Status: COMPLETED | OUTPATIENT
Start: 2025-04-09 | End: 2025-04-09

## 2025-04-09 RX ORDER — CEPHALEXIN 500 MG/1
500 CAPSULE ORAL EVERY 12 HOURS SCHEDULED
Qty: 20 CAPSULE | Refills: 0 | Status: SHIPPED | OUTPATIENT
Start: 2025-04-09 | End: 2025-04-19

## 2025-04-09 RX ORDER — HYDROCODONE BITARTRATE AND ACETAMINOPHEN 5; 325 MG/1; MG/1
1 TABLET ORAL EVERY 6 HOURS PRN
Qty: 30 TABLET | Refills: 0 | Status: SHIPPED | OUTPATIENT
Start: 2025-04-09 | End: 2025-04-19

## 2025-04-09 RX ORDER — LIDOCAINE HYDROCHLORIDE AND EPINEPHRINE 10; 10 MG/ML; UG/ML
INJECTION, SOLUTION INFILTRATION; PERINEURAL AS NEEDED
Status: DISCONTINUED | OUTPATIENT
Start: 2025-04-09 | End: 2025-04-09 | Stop reason: HOSPADM

## 2025-04-09 RX ORDER — PHENYLEPHRINE HCL IN 0.9% NACL 1 MG/10 ML
SYRINGE (ML) INTRAVENOUS AS NEEDED
Status: DISCONTINUED | OUTPATIENT
Start: 2025-04-09 | End: 2025-04-09

## 2025-04-09 RX ORDER — PROPOFOL 10 MG/ML
INJECTION, EMULSION INTRAVENOUS AS NEEDED
Status: DISCONTINUED | OUTPATIENT
Start: 2025-04-09 | End: 2025-04-09

## 2025-04-09 RX ORDER — SUCCINYLCHOLINE/SOD CL,ISO/PF 100 MG/5ML
SYRINGE (ML) INTRAVENOUS AS NEEDED
Status: DISCONTINUED | OUTPATIENT
Start: 2025-04-09 | End: 2025-04-09

## 2025-04-09 RX ORDER — HYDROMORPHONE HCL/PF 1 MG/ML
0.5 SYRINGE (ML) INJECTION
Status: DISCONTINUED | OUTPATIENT
Start: 2025-04-09 | End: 2025-04-09 | Stop reason: HOSPADM

## 2025-04-09 RX ORDER — SODIUM CHLORIDE, SODIUM LACTATE, POTASSIUM CHLORIDE, CALCIUM CHLORIDE 600; 310; 30; 20 MG/100ML; MG/100ML; MG/100ML; MG/100ML
INJECTION, SOLUTION INTRAVENOUS CONTINUOUS PRN
Status: DISCONTINUED | OUTPATIENT
Start: 2025-04-09 | End: 2025-04-09

## 2025-04-09 RX ORDER — FENTANYL CITRATE 50 UG/ML
INJECTION, SOLUTION INTRAMUSCULAR; INTRAVENOUS AS NEEDED
Status: DISCONTINUED | OUTPATIENT
Start: 2025-04-09 | End: 2025-04-09

## 2025-04-09 RX ORDER — DEXMEDETOMIDINE HYDROCHLORIDE 4 UG/ML
INJECTION, SOLUTION INTRAVENOUS AS NEEDED
Status: DISCONTINUED | OUTPATIENT
Start: 2025-04-09 | End: 2025-04-09

## 2025-04-09 RX ORDER — DEXAMETHASONE SODIUM PHOSPHATE 10 MG/ML
INJECTION, SOLUTION INTRAMUSCULAR; INTRAVENOUS AS NEEDED
Status: DISCONTINUED | OUTPATIENT
Start: 2025-04-09 | End: 2025-04-09

## 2025-04-09 RX ORDER — LIDOCAINE HYDROCHLORIDE 10 MG/ML
INJECTION, SOLUTION EPIDURAL; INFILTRATION; INTRACAUDAL; PERINEURAL AS NEEDED
Status: DISCONTINUED | OUTPATIENT
Start: 2025-04-09 | End: 2025-04-09

## 2025-04-09 RX ORDER — ONDANSETRON 2 MG/ML
INJECTION INTRAMUSCULAR; INTRAVENOUS AS NEEDED
Status: DISCONTINUED | OUTPATIENT
Start: 2025-04-09 | End: 2025-04-09

## 2025-04-09 RX ORDER — EPHEDRINE SULFATE 50 MG/ML
INJECTION INTRAVENOUS AS NEEDED
Status: DISCONTINUED | OUTPATIENT
Start: 2025-04-09 | End: 2025-04-09

## 2025-04-09 RX ORDER — MIDAZOLAM HYDROCHLORIDE 2 MG/2ML
INJECTION, SOLUTION INTRAMUSCULAR; INTRAVENOUS AS NEEDED
Status: DISCONTINUED | OUTPATIENT
Start: 2025-04-09 | End: 2025-04-09

## 2025-04-09 RX ORDER — IPRATROPIUM BROMIDE AND ALBUTEROL SULFATE 2.5; .5 MG/3ML; MG/3ML
3 SOLUTION RESPIRATORY (INHALATION)
Status: COMPLETED | OUTPATIENT
Start: 2025-04-09 | End: 2025-04-09

## 2025-04-09 RX ADMIN — DEXMEDETOMIDINE HYDROCHLORIDE 4 MCG: 400 INJECTION INTRAVENOUS at 13:13

## 2025-04-09 RX ADMIN — PROPOFOL 120 MG: 10 INJECTION, EMULSION INTRAVENOUS at 12:51

## 2025-04-09 RX ADMIN — FENTANYL CITRATE 50 MCG: 0.05 INJECTION, SOLUTION INTRAMUSCULAR; INTRAVENOUS at 13:06

## 2025-04-09 RX ADMIN — SODIUM CHLORIDE, SODIUM LACTATE, POTASSIUM CHLORIDE, AND CALCIUM CHLORIDE: .6; .31; .03; .02 INJECTION, SOLUTION INTRAVENOUS at 12:46

## 2025-04-09 RX ADMIN — Medication 100 MCG: at 13:32

## 2025-04-09 RX ADMIN — PROPOFOL 30 MG: 10 INJECTION, EMULSION INTRAVENOUS at 13:05

## 2025-04-09 RX ADMIN — Medication 200 MCG: at 13:23

## 2025-04-09 RX ADMIN — SODIUM CHLORIDE, SODIUM LACTATE, POTASSIUM CHLORIDE, AND CALCIUM CHLORIDE: .6; .31; .03; .02 INJECTION, SOLUTION INTRAVENOUS at 12:39

## 2025-04-09 RX ADMIN — ONDANSETRON 4 MG: 2 INJECTION, SOLUTION INTRAMUSCULAR; INTRAVENOUS at 13:05

## 2025-04-09 RX ADMIN — DEXMEDETOMIDINE HYDROCHLORIDE 4 MCG: 400 INJECTION INTRAVENOUS at 13:26

## 2025-04-09 RX ADMIN — IPRATROPIUM BROMIDE AND ALBUTEROL SULFATE 3 ML: .5; 3 SOLUTION RESPIRATORY (INHALATION) at 14:13

## 2025-04-09 RX ADMIN — ACETAMINOPHEN 975 MG: 325 TABLET ORAL at 12:43

## 2025-04-09 RX ADMIN — DEXAMETHASONE SODIUM PHOSPHATE 10 MG: 10 INJECTION, SOLUTION INTRAMUSCULAR; INTRAVENOUS at 13:00

## 2025-04-09 RX ADMIN — LIDOCAINE HYDROCHLORIDE 50 MG: 10 INJECTION, SOLUTION EPIDURAL; INFILTRATION; INTRACAUDAL; PERINEURAL at 12:51

## 2025-04-09 RX ADMIN — MIDAZOLAM 1 MG: 1 INJECTION INTRAMUSCULAR; INTRAVENOUS at 13:07

## 2025-04-09 RX ADMIN — PROPOFOL 150 MCG/KG/MIN: 10 INJECTION, EMULSION INTRAVENOUS at 12:52

## 2025-04-09 RX ADMIN — FENTANYL CITRATE 50 MCG: 0.05 INJECTION, SOLUTION INTRAMUSCULAR; INTRAVENOUS at 12:51

## 2025-04-09 RX ADMIN — Medication 80 MG: at 12:51

## 2025-04-09 RX ADMIN — MIDAZOLAM 1 MG: 1 INJECTION INTRAMUSCULAR; INTRAVENOUS at 12:46

## 2025-04-09 RX ADMIN — PROPOFOL 50 MG: 10 INJECTION, EMULSION INTRAVENOUS at 13:13

## 2025-04-09 RX ADMIN — PROPOFOL 50 MG: 10 INJECTION, EMULSION INTRAVENOUS at 12:58

## 2025-04-09 RX ADMIN — EPHEDRINE SULFATE 5 MG: 50 INJECTION, SOLUTION INTRAVENOUS at 13:51

## 2025-04-09 RX ADMIN — Medication 100 MCG: at 13:37

## 2025-04-09 NOTE — INTERVAL H&P NOTE
H&P reviewed. After examining the patient I find no changes in the patients condition since the H&P had been written.    Vitals:    04/09/25 1234   BP: (!) 172/79   Pulse: 81   Resp: 20   Temp: (!) 97.2 °F (36.2 °C)   SpO2: 98%

## 2025-04-09 NOTE — OP NOTE
OPERATIVE REPORT  PATIENT NAME: Corie Pal    :  1955  MRN: [id number]  Pt Location: OW OR ROOM 01    SURGERY DATE: 2025    Surgeons and Role:     * Antonio Carter MD - Primary    Preop Diagnosis:  Localized enlarged lymph nodes [R59.0]  Dysphonia [R49.0]  Localized swelling, mass and lump, neck [R22.1]    Post-Op Diagnosis Codes:     * Localized enlarged lymph nodes [R59.0]     * Dysphonia [R49.0]     * Localized swelling, mass and lump, neck [R22.1]    Procedure(s):  Right -deep cervical lymph node excision and biopsies.  4 centimeters in total tissue.    Specimen(s):  ID Type Source Tests Collected by Time Destination   1 : Right posterior cervical lymph nodes r/o carcinoma *FROZEN* Tissue Lymph Node TISSUE EXAM Antonio Carter MD 2025  1:19 PM        Estimated Blood Loss:   Minimal    Drains:  * No LDAs found *    Anesthesia Type:   IV Sedation with Anesthesia    Operative Indications:  Localized enlarged lymph nodes [R59.0]  Dysphonia [R49.0]  Localized swelling, mass and lump, neck [R22.1]      Operative Findings:  Cluster of lymph nodes      Complications:   None    Procedure and Technique:  She was identified and taken to the operative suite.  A timeout was called.  After successful induction of general anesthesia and endotracheal intubation, she was prepped draped in usual fashion.  A curvilinear incision was marked adjacent to the cluster of nodes along the sternocleidomastoid of the right side.  A 3 cm incision was marked and then injected with 1% lidocaine, 1-100,000 epinephrine.    A 15 blade was used to incise the skin and subcutaneous tissues and a hemostat was used to bluntly dissect the lymph node from its underlying muscular and fibrofatty layers.  The border of the sternocleidomastoid was retracted anteriorly, revealing a cluster of lymph nodes which were removed with serial electrocautery and blunt dissection.  The spinal accessory nerve was found to be involved with the  cluster of nodes and was dissected free of the nodes.  Grossly, it appeared that the nerve was involved with the tumor.  Small perforating branches were  were necessary.  A 4 cm cluster of lymph nodes was removed and sent for frozen section analysis.  Frozen section analysis revealed malignancy without definitive source and sections were deferred for permanent analysis.  No significant bleeding was encountered.  The wound was irrigated and no further bleeding was identified.    The wound was closed in layers, 4-0 chromic for the dermal layer and Dermabond for the skin.  The patient was awakened and endotracheal tube removed without incident and taken to the PACU in his condition.  Instrument and sponge counts were correct x 2 at the end of the case.    I was present for the entire procedure.    Patient Disposition:  PACU     This procedure was not performed to treat primary cutaneous melanoma through wide local excision           SIGNATURE: Antonio Carter MD  DATE: April 9, 2025  TIME: 1:58 PM

## 2025-04-09 NOTE — ANESTHESIA POSTPROCEDURE EVALUATION
Post-Op Assessment Note    CV Status:  Stable    Pain management: adequate    Multimodal analgesia used between 6 hours prior to anesthesia start to PACU discharge    Mental Status:  Alert and awake   Hydration Status:  Euvolemic   PONV Controlled:  Controlled   Airway Patency:  Patent     Post Op Vitals Reviewed: Yes    No anethesia notable event occurred.    Staff: CRNA           Last Filed PACU Vitals:  Vitals Value Taken Time   Temp 97.5    Pulse 82 04/09/25 1403   /63 04/09/25 1400   Resp 16 04/09/25 1403   SpO2 89 % 04/09/25 1403   Vitals shown include unfiled device data.

## 2025-04-09 NOTE — DISCHARGE SUMMARY
Discharge Summary - ENT   Name: Corie Pal 69 y.o. female I MRN: [id number]  Unit/Bed#: OR POOL I Date of Admission: 4/9/2025   Date of Service: 4/9/2025 I Hospital Day: 0    Admission Date: 4/9/2025 1220  Discharge Date: 04/09/25  Admitting Diagnosis: Localized enlarged lymph nodes [R59.0]  Dysphonia [R49.0]  Localized swelling, mass and lump, neck [R22.1]  Discharge Diagnosis:   Medical Problems       Resolved Problems  Date Reviewed: 4/9/2025   None         HPI: Status post excision of deep cervical lymph nodes right neck    Procedures Performed: No orders of the defined types were placed in this encounter.      Summary of Hospital Course:  unremarkable    Significant Findings, Care, Treatment and Services Provided: Surgery    Complications: None    Condition at Discharge: good       Discharge instructions/Information to patient and family:   See After Visit Summary (AVS) for information provided to patient and family.      Provisions for Follow-Up Care:  See after visit summary for information related to follow-up care and any pertinent home health orders.      PCP: Glory Huerta MD    Disposition: Home    Planned Readmission: No     Discharge Medications:  See after visit summary for reconciled discharge medications provided to patient and family.      Discharge Statement:  I have spent a total time of 15 minutes in caring for this patient on the day of the visit/encounter. .

## 2025-04-10 NOTE — ANESTHESIA POSTPROCEDURE EVALUATION
Post-Op Assessment Note    CV Status:  Stable  Pain Score: 1    Pain management: adequate       Mental Status:  Alert   PONV Controlled:  None   Airway Patency:  Patent  There is a medical reason for not screening for obstructive sleep apnea and/or for not using two or more mitigation strategies   Post Op Vitals Reviewed: Yes    No anethesia notable event occurred.    Staff: Anesthesiologist           Last Filed PACU Vitals:  Vitals Value Taken Time   Temp 97.5 °F (36.4 °C) 04/09/25 1428   Pulse 74 04/09/25 1428   /68 04/09/25 1428   Resp 20 04/09/25 1428   SpO2 94 % 04/09/25 1428       Modified Jeremias:     Vitals Value Taken Time   Activity 2 04/09/25 1428   Respiration 2 04/09/25 1428   Circulation 2 04/09/25 1428   Consciousness 2 04/09/25 1428   Oxygen Saturation 2 04/09/25 1428     Modified Jeremias Score: 10

## 2025-04-11 ENCOUNTER — TELEPHONE (OUTPATIENT)
Age: 70
End: 2025-04-11

## 2025-04-11 DIAGNOSIS — R91.8 ABNORMAL CT SCAN OF LUNG: Primary | ICD-10-CM

## 2025-04-11 PROCEDURE — 88341 IMHCHEM/IMCYTCHM EA ADD ANTB: CPT | Performed by: PATHOLOGY

## 2025-04-11 PROCEDURE — 88342 IMHCHEM/IMCYTCHM 1ST ANTB: CPT | Performed by: PATHOLOGY

## 2025-04-11 PROCEDURE — 88307 TISSUE EXAM BY PATHOLOGIST: CPT | Performed by: PATHOLOGY

## 2025-04-11 PROCEDURE — 88344 IMHCHEM/IMCYTCHM EA MLT ANTB: CPT | Performed by: PATHOLOGY

## 2025-04-11 NOTE — TELEPHONE ENCOUNTER
Spoke with Alyssa and reviewed biopsy results - adenocarcinoma with pulmonary origin. No clear pulmonary primary on CT Imaging but there is lymphadenopathy.  She has PET CT at [facility] 4/15 - I will order here to see if she can have it done sooner with JULIO. I will refer for hem onc.

## 2025-04-11 NOTE — PROGRESS NOTES
Cardiology Follow Up    Corie Pal  1955  [id number]  [facility] CARDIOVASC PHYS  [address]  [facility] [address] [phone]  [phone]    Alyssa presents for follow up from her hospitalization with elevated troponin and no obstructive CAD on cath.    1. Coronary artery disease involving native coronary artery of native heart without angina pectoris  Assessment & Plan:  A. MINOCA (MI with no obstructive coronary disease) 2/10/2025  B. Cardiac cath 2/11/2025 with MLI in the LAD with tortuous distal LAD  C. Echo 2/10/2025 with EF 65% with basal inferior hypo to akinesis  - - - - -  Currently without angina.  Continue aspirin 81 mg daily (enteric coated).  Continue high intensity statin. Transition to rosuvastatin 40 mg daily as she is having trouble swallowing the large 80 mg atorvastatin.  Continue carvedilol 6.25 mg BID (can up-titrate as needed to control heart rates).   Orders:  -     Ambulatory referral to Cardiology  -     Echo follow up/limited w/ contrast if indicated; Future; Expected date: 05/14/2025  -     POCT ECG  2. Essential hypertension  Assessment & Plan:  Excellent BP control today and per home readings  Continue carvedilol 6.25 mg BID and losartan 25 mg daily.  Orders:  -     Echo follow up/limited w/ contrast if indicated; Future; Expected date: 05/14/2025  -     POCT ECG  3. Mixed hyperlipidemia  Assessment & Plan:  Goal LDL < 70  Currently struggling to swallow 80 mg atorvastatin.  Transition to rosuvastatin 40 mg daily.  Has lab work orders to monitor renal function.  Plan updated lipid in 3 months.  Orders:  -     rosuvastatin (CRESTOR) 40 MG tablet; Take 1 tablet (40 mg total) by mouth daily  4. Adenocarcinoma (HCC)  Assessment & Plan:  Recently confirmed by lymph node biopsy  Following with [facility] pulmonology and referred to hem/onc  Scheduled for PET-CT and MRI this week  5. PSVT (paroxysmal supraventricular tachycardia) (HCC)  Assessment & Plan:  History of heart  "racing episodes  14 day ZIO 2/17-3/1/25 shows 11 SVT episodes with longest 14 beats.  Continue carvedilol 6.25 mg BID. Can up-titrate as needed.  6. Tobacco use  Assessment & Plan:  Continues to smoke about 4 cig daily. Strongly encouraged complete cessation. She does have patches at home to use.     HPI  Corie \"Alyssa\" has a past medical history of HTN, HLD, glaucoma, and tobacco use.    She established with [facility] when she presented to the [facility] ER 2/10/2025 with diaphoresis, palpitations, and nausea for several weeks. Initial ECG showed SR with inferolateral T wave inversions. HS trop was elevated. Echocardiogram showed preserved EF with basal inferior wall motion abnormality. She was started on aspirin and Brilinta with heparin drip and transferred to [facility] for cardiac cath. LHC 2/11/2025 showed minimal coronary plaque in the LAD with a tortuous distal LAD concerning for poor BP management. LVEDP normal without gradient. She was started on aspirin 81 mg daily. Amlodipine was discontinued and replaced with losartan 25 mg daily. Carvedilol 6.25 mg BID was continued. Atorvastatin 80 mg daily was prescribed. A 2 week ZIO monitor was ordered due to c/o episodic heart racing. Outpatient pulmonology evaluation was scheduled as CT chest showed bilateral opacities along with mediastinal lymphadenopathy.     4/14/2025: Alyssa presents today for follow up from her 2/2025 hospitalization. She is accompanied by her spouse. 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 She underwent cervical lymph node excision on 4/9/2025. She is scheduled for PET-CT, MRI imaging. She has been referred to hem/onc with consult pending. She denies any recurrent angina or heart racing. She feels the pulmonary treatments are helping this. She completed a ZIO 2/17-3/1/2025 which showed sinus rhythm with avg HR 81 bpm and 11 runs of SVT, longest 14 " beats with 1.7% PAC burden. There were no triggered events during the testing period. We reviewed her hospitalization and test results in great detail. She continues to smoke about 4 cig daily, but is working to quit. She is having trouble swallowing the atorvastatin due to it's size. She stopped the aspirin for her lymph node biopsy and has not yet resumed it. She is currently experiencing nausea and lack of appetite. Her primary concern today is getting a script for Zofran, which she tells me her PCP will not renew due to EKG findings.    Medical Problems       Problem List       Abnormal CT scan of lung    Elevated troponin    Tobacco use    Mediastinal lymphadenopathy    Chronic cough    Elevated troponin level not due to acute coronary syndrome    Essential hypertension    Mixed hyperlipidemia        Past Medical History:   Diagnosis Date    BRCA1 negative     patient states that BRCA 1 came back indeterminate    BRCA2 negative     Family history of breast cancer 2019    Glaucoma     History of transfusion     as teen    Hyperlipidemia     Hypertension     Iris nevus     Lung cancer (HCC)     Melanoma, intraocular, iris, left (HCC) 2006    iris nevus tumor removed    Nonobstructive atherosclerosis of coronary artery     Pneumonia 2024    ED 2/10/2025    PONV (postoperative nausea and vomiting)      Social History     Socioeconomic History    Marital status: /Civil Union     Spouse name: Not on file    Number of children: Not on file    Years of education: Not on file    Highest education level: Not on file   Occupational History    Not on file   Tobacco Use    Smoking status: Some Days     Current packs/day: 0.00     Average packs/day: 0.3 packs/day for 52.1 years (13.0 ttl pk-yrs)     Types: Cigarettes     Start date:      Last attempt to quit: 2/10/2025     Years since quittin.1    Smokeless tobacco: Never   Vaping Use    Vaping status: Former    Substances: Nicotine   Substance  and Sexual Activity    Alcohol use: Not Currently     Alcohol/week: 3.0 standard drinks of alcohol     Types: 3 Glasses of wine per week     Comment: socially    Drug use: Never    Sexual activity: Yes   Other Topics Concern    Not on file   Social History Narrative    Not on file     Social Drivers of Health     Financial Resource Strain: Low Risk  (3/27/2025)    Received from [facility]    Financial Resource Strain     Do you have any trouble paying for your medications, or do you think you might in the future?: No     Does your family have trouble paying for medicine? (Household - for ages 0-17 years): Not on file   Food Insecurity: No Food Insecurity (3/27/2025)    Received from [facility]    Hunger Vital Sign     Worried About Running Out of Food in the Last Year: Never true     Ran Out of Food in the Last Year: Never true   Transportation Needs: No Transportation Needs (3/27/2025)    Received from [facility]    Transportation Needs     Do you have trouble getting a ride to medical visits or work? (Adult - for ages 18 years and over): Not on file     Does your family have a hard time getting a ride to doctors’ visits? (Household - for ages 0-17 years): Not on file     Has lack of transportation kept you from medical appointments, meetings, work, or from getting things needed for daily living? Check all that apply.: No     Do you (or your family) have trouble finding or paying for a ride (transportation)? (Household - for ages 0-17 years): Not on file   Physical Activity: Not on file   Stress: Not on file   Social Connections: Socially Integrated (3/27/2025)    Received from [facility]    Social Connections     How often do you feel lonely or isolated from those around you?: Never   Intimate Partner Violence: Not on file   Housing Stability: Low Risk  (3/27/2025)    Received from [facility]    Housing Stability     Do you currently live in a shelter or have no steady place to sleep at night?: No     Do you think you  are at risk of becoming homeless? (Adult - for ages 18 years and over): Not on file     Does your family worry about paying for your home or becoming homeless? (Household - for ages 0-17 years): Not on file     Are you homeless or worried that you might be in the future?: No     Are you (or your family) homeless or worried that you might be in the future? (Household - for ages 0-17 years): Not on file      Family History   Problem Relation Age of Onset    Alzheimer's disease Mother     No Known Problems Father         never recovered after a surgery for his stomach    Breast cancer Sister 42    No Known Problems Sister     Lung cancer Sister          from stomach cancer    No Known Problems Maternal Aunt     Heart defect Maternal Aunt     Breast cancer Paternal Aunt         82    Breast cancer Maternal Grandmother 38    No Known Problems Maternal Grandfather     No Known Problems Paternal Grandmother     No Known Problems Paternal Grandfather     No Known Problems Daughter     Breast cancer Cousin 44    Breast cancer Cousin 45    Breast cancer additional onset Neg Hx     BRCA2 Positive Neg Hx     BRCA2 Negative Neg Hx     BRCA1 Positive Neg Hx     BRCA1 Negative Neg Hx     BRCA 1/2 Neg Hx     Colon cancer Neg Hx     Ovarian cancer Neg Hx     Endometrial cancer Neg Hx      Past Surgical History:   Procedure Laterality Date    BREAST EXCISIONAL BIOPSY Left     age 19; benign lesion    BREAST EXCISIONAL BIOPSY Left 2000    atypical hyperplasia    BREAST EXCISIONAL BIOPSY Left 2002    atypical intraductal hyperplasia    BREAST EXCISIONAL BIOPSY Left 2002    intraductal epithelial hyperplasia and papillomatosis with focal atypia    BREAST EXCISIONAL BIOPSY Left 2003    foci of atypical intraductal hyperplasia    BREAST EXCISIONAL BIOPSY Left 2006    fibrocystic change/fibrous mastopathy    BREAST EXCISIONAL BIOPSY Left 2008    fibrocystic changes    CARDIAC CATHETERIZATION  N/A 02/11/2025    Procedure: Left Heart Catherization;  Surgeon: Yocasta Elias DO;  Location: [facility] CARDIAC [facility];  Service: Cardiology    CARDIAC CATHETERIZATION N/A 02/11/2025    Procedure: Cardiac Coronary Angiogram;  Surgeon: Yocasta Elias DO;  Location: [facility];  Service: Cardiology    COLONOSCOPY      DILATION AND CURETTAGE OF UTERUS      EYE SURGERY Left 12/21/2006    HYSTERECTOMY  02/2000    LYMPH NODE BIOPSY Right 4/9/2025    Procedure: RIGHT LYMPH NODE  BIOPSY WITH FROZEN SECTION ANALYSIS;  Surgeon: Antonio Carter MD;  Location:  MAIN OR;  Service: ENT    OOPHORECTOMY Bilateral 2013    approximately    REFRACTIVE SURGERY      US GUIDANCE BREAST BIOPSY LEFT EACH ADDITIONAL Left 04/28/2008    benign breast tissue with stromal fibrosis and sclerosing adenosis    US GUIDED BREAST BIOPSY LEFT COMPLETE Left 04/28/2008    benign breast tissue with stromal fibrosis and sclerosing adenosis       Current Outpatient Medications:     acetaminophen (TYLENOL) 500 mg tablet, Take 1 tablet by mouth every 6 (six) hours as needed, Disp: , Rfl:     albuterol (Proventil HFA) 90 mcg/act inhaler, Inhale 2 puffs every 6 (six) hours as needed for wheezing, Disp: 6.7 g, Rfl: 5    aspirin (ECOTRIN LOW STRENGTH) 81 mg EC tablet, Take 81 mg by mouth daily, Disp: , Rfl:     brimonidine (ALPHAGAN P) 0.15 % ophthalmic solution, Apply to eye, Disp: , Rfl:     brimonidine-timolol (COMBIGAN) 0.2-0.5 %, Administer to both eyes every 12 (twelve) hours, Disp: , Rfl:     carvedilol (COREG) 6.25 mg tablet, Take 6.25 mg by mouth 2 (two) times a day, Disp: , Rfl:     cephalexin (KEFLEX) 500 mg capsule, Take 1 capsule (500 mg total) by mouth every 12 (twelve) hours for 20 doses, Disp: 20 capsule, Rfl: 0    EPINEPHrine (EPIPEN) 0.3 mg/0.3 mL SOAJ, Inject as directed, Disp: , Rfl:     ipratropium-albuterol (DUO-NEB) 0.5-2.5 mg/3 mL nebulizer solution, Take 3 mL by nebulization 4 (four) times a day, Disp: 360 mL, Rfl: 3     loratadine (CLARITIN) 10 mg tablet, Take 10 mg by mouth daily, Disp: , Rfl:     losartan (COZAAR) 25 mg tablet, Take 1 tablet (25 mg total) by mouth daily, Disp: 30 tablet, Rfl: 0    prednisoLONE acetate (PRED MILD) 0.12 % ophthalmic suspension, Administer 1 drop into the left eye 4 (four) times a day, Disp: , Rfl:     rosuvastatin (CRESTOR) 40 MG tablet, Take 1 tablet (40 mg total) by mouth daily, Disp: 30 tablet, Rfl: 11    HYDROcodone-acetaminophen (NORCO) 5-325 mg per tablet, Take 1 tablet by mouth every 6 (six) hours as needed for pain for up to 10 days Max Daily Amount: 4 tablets (Patient not taking: Reported on 4/14/2025), Disp: 30 tablet, Rfl: 0    nicotine (NICODERM CQ) 14 mg/24hr TD 24 hr patch, Place 1 patch on the skin over 24 hours daily as needed (nicotine replacement) (Patient not taking: Reported on 4/14/2025), Disp: 30 patch, Rfl: 0  Allergies   Allergen Reactions    Bee Venom Anaphylaxis    Dayquil [Pseudoephedrine-Dm-Gg-Apap] Itching and Throat Swelling    Pseudoephedrine-Acetaminophen Hives     Other Reaction(s): Swelling       Labs:     Chemistry        Component Value Date/Time    K 3.9 03/30/2025 1419    K 4.2 02/28/2025 1030     03/30/2025 1419    CL 98 02/28/2025 1030    CO2 29 03/30/2025 1419    CO2 25 02/28/2025 1030    BUN 7 03/30/2025 1419    BUN 7 02/28/2025 1030    CREATININE 0.43 (L) 03/30/2025 1419    CREATININE 0.4 (L) 02/28/2025 1030        Component Value Date/Time    CALCIUM 8.7 03/30/2025 1419    CALCIUM 9.3 02/28/2025 1030    ALKPHOS 83 03/30/2025 1419    AST 24 03/30/2025 1419    ALT 20 03/30/2025 1419        Lipid panel 2/28/2025: C 155. T 75. H 80. L 60.   Lipid panel 8/24/2023: C 232. T 93. H 95. L 118    Cardiac testing:  ECG 4/14/2025: NSR. Normal ECG. Qtc 466 ms.     ECG 3/30/2025: NSR with nonspecific T wave abnormality. Qtc 475 ms.    14 day ZIO 2/17-3/1/2025:  Predominantly sinus rhythm, HR , avg 81 bpm.  One 6-beat NSVT.  11 runs of SVT, longest 14 beats.  "  1.7% PAC burden. Rare PVC's.    C 2/11/2025:  MLI in the LAD with tortuous distal LAD.    Echo 2/10/2025:  LVEF 65%, mild LVH. Basal inferolateral wall hypo to akinetic.  Mild-mod MR. Mild TR.    ECG 2/10/2025: NSR. Anterolateral ischemia. Prolonged QT of 534.     Review of Systems   Constitutional: Negative.   HENT: Negative.     Cardiovascular:  Positive for dyspnea on exertion and leg swelling (intermittent). Negative for chest pain, irregular heartbeat, near-syncope, orthopnea and palpitations.   Respiratory:  Positive for cough, shortness of breath, sputum production and wheezing. Negative for snoring.    Endocrine: Negative.    Skin: Negative.    Musculoskeletal: Negative.    Gastrointestinal: Negative.    Genitourinary: Negative.    Neurological: Negative.    Psychiatric/Behavioral: Negative.         Vitals:    04/14/25 1300   BP: 104/58   Pulse: 65   Temp: 98.2 °F (36.8 °C)   SpO2: 100%     Vitals:    04/14/25 1300   Weight: 46.3 kg (102 lb)     Height: 5' 7\" (170.2 cm)   Body mass index is 15.98 kg/m².    Physical Exam  Vitals and nursing note reviewed.   Constitutional:       General: She is not in acute distress.     Appearance: She is underweight. She is not diaphoretic.   HENT:      Head: Normocephalic and atraumatic.   Neck:      Vascular: No JVD.   Cardiovascular:      Rate and Rhythm: Normal rate and regular rhythm.      Pulses: Intact distal pulses.           Radial pulses are 2+ on the right side and 2+ on the left side.      Heart sounds: Normal heart sounds, S1 normal and S2 normal. No murmur heard.     Comments: Trace edema at ankles only  Pulmonary:      Effort: Pulmonary effort is normal.      Breath sounds: Decreased breath sounds and rhonchi present.      Comments: Frequent cough during exam  Abdominal:      General: There is no distension.      Palpations: Abdomen is soft.      Tenderness: There is no abdominal tenderness.   Musculoskeletal:         General: Normal range of motion.      " Cervical back: Normal range of motion and neck supple.   Skin:     General: Skin is warm and dry.   Neurological:      Mental Status: She is alert and oriented to person, place, and time.      Cranial Nerves: No cranial nerve deficit.   Psychiatric:         Behavior: Behavior normal.

## 2025-04-11 NOTE — TELEPHONE ENCOUNTER
"SHARRON Argueta    Pt called stating she must speak to you today regarding Lymph Node biopsy, primary source is lung and ENT Provider wants Pulmonary to \"move on this next week\". Pt states 2 lymph nodes and \"some of this moved to a muscle by lymph node.\"    DX: Metastatic Lung Adenocarcinoma,     Pt requesting a call back today from PULM provider.  "

## 2025-04-14 ENCOUNTER — TELEPHONE (OUTPATIENT)
Dept: CARDIOLOGY CLINIC | Facility: CLINIC | Age: 70
End: 2025-04-14

## 2025-04-14 ENCOUNTER — DOCUMENTATION (OUTPATIENT)
Dept: HEMATOLOGY ONCOLOGY | Facility: CLINIC | Age: 70
End: 2025-04-14

## 2025-04-14 ENCOUNTER — OFFICE VISIT (OUTPATIENT)
Dept: CARDIOLOGY CLINIC | Facility: CLINIC | Age: 70
End: 2025-04-14
Payer: MEDICARE

## 2025-04-14 ENCOUNTER — PATIENT OUTREACH (OUTPATIENT)
Dept: HEMATOLOGY ONCOLOGY | Facility: CLINIC | Age: 70
End: 2025-04-14

## 2025-04-14 VITALS
WEIGHT: 102 LBS | SYSTOLIC BLOOD PRESSURE: 104 MMHG | OXYGEN SATURATION: 100 % | TEMPERATURE: 98.2 F | HEIGHT: 67 IN | HEART RATE: 65 BPM | BODY MASS INDEX: 16.01 KG/M2 | DIASTOLIC BLOOD PRESSURE: 58 MMHG

## 2025-04-14 DIAGNOSIS — I25.10 CORONARY ARTERY DISEASE INVOLVING NATIVE CORONARY ARTERY OF NATIVE HEART WITHOUT ANGINA PECTORIS: Primary | ICD-10-CM

## 2025-04-14 DIAGNOSIS — I47.10 PSVT (PAROXYSMAL SUPRAVENTRICULAR TACHYCARDIA) (HCC): ICD-10-CM

## 2025-04-14 DIAGNOSIS — I10 ESSENTIAL HYPERTENSION: ICD-10-CM

## 2025-04-14 DIAGNOSIS — C80.1 ADENOCARCINOMA (HCC): Primary | ICD-10-CM

## 2025-04-14 DIAGNOSIS — E78.2 MIXED HYPERLIPIDEMIA: ICD-10-CM

## 2025-04-14 DIAGNOSIS — C80.1 ADENOCARCINOMA (HCC): ICD-10-CM

## 2025-04-14 DIAGNOSIS — Z72.0 TOBACCO USE: ICD-10-CM

## 2025-04-14 PROBLEM — R79.89 ELEVATED TROPONIN LEVEL NOT DUE TO ACUTE CORONARY SYNDROME: Status: RESOLVED | Noted: 2025-02-10 | Resolved: 2025-04-14

## 2025-04-14 PROCEDURE — 93000 ELECTROCARDIOGRAM COMPLETE: CPT | Performed by: NURSE PRACTITIONER

## 2025-04-14 PROCEDURE — 99214 OFFICE O/P EST MOD 30 MIN: CPT | Performed by: NURSE PRACTITIONER

## 2025-04-14 RX ORDER — ASPIRIN 81 MG/1
81 TABLET ORAL DAILY
COMMUNITY

## 2025-04-14 RX ORDER — ROSUVASTATIN CALCIUM 40 MG/1
40 TABLET, COATED ORAL DAILY
Qty: 30 TABLET | Refills: 11 | Status: SHIPPED | OUTPATIENT
Start: 2025-04-14

## 2025-04-14 NOTE — PATIENT INSTRUCTIONS
EKG today to check QT interval.  Switch from atorvastatin 80 mg to rosuvastatin 40  mg which is a smaller pill and should be easier to swallow  For your coronary plaque we recommend a safety coated 81 aspirin.  Goal LDL < 70.  Recheck the ultrasound of your heart in May

## 2025-04-14 NOTE — PROGRESS NOTES
Initial outreach. Spoke to patient. Introduced myself and explained the role of an Oncology Nurse Navigator to assist with coordination of cancer care, preparation for upcoming appointment, be a point of contact prior to oncology consult, provide support and connect her with available resources. Discussed medical oncology referral placed by KRISTI Murillo for lung cancer.  Advised she will also need MRI brain. Explained I will be their main contact until they are established with their team and a treatment plan is established. Advised I wanted to reach out to let her know I will be working on getting things moving. I have a meeting I must attend but I will reach out after meeting. States she has a doctors appointment. She has my contact information and will call me back after her appointment.  Verbalized understanding and expressed appreciation of call.       Do you have a history of cancer? yes - Low grade melanoma intraocular left iris   Have you ever received Radiation Therapy? No  Where  did you receive RT? N/A   When? N/A  Do you have any implantable devices? Yes plastic /nylon dry in [facility] [place] years ago unsure when.   [] Pacemaker  [] Pain stimulator  [] Defibrillator  [] Implanted sleep apnea device  [] Port    Patient returned call.  Asked how her appointment with went. States it went well and thanked ONN. Reviewed I will be assisting with upcoming appointments.      Introduced Ambreen, and explained she will be her patient navigator, who will reach out after the first consult to introduce themselves. The PN will provide support, make sure she has a good understanding of the plan and schedule and to connect with additional resources if/when needed.     Assessed for barriers of care. My direct contact information provided. Patient is aware that she can call me should she need any further assistance. Patient was appreciative of assistance.              Smoking: Yes, describe: 4 cigarettes ,  smoking since teenager has nicotine patches   Cough/Hemoptysis/SOB: Yes, describe: productive cough for clear phlegm, denies hemoptysis, SOB varies on day.   Pain(CP/back/rib/bone): Yes, describe: mid right back after using nebulizer - alternating between motrin and tylenol intermittent. Current pain is mostly from neck biopsy where they went into muscle.      Loss of appetite: Yes, describe: decrease - , noting nausea   Wt Loss Yes, describe: 10-15 in past year to year and half  Fatigue Yes, describe: off and on  other: Yes, describe: nausea at night, diarrhea, night sweats for about 4 months       PCP: Dr. Huerta

## 2025-04-14 NOTE — PROGRESS NOTES
PN please retrieve outside records.    Pt referred to Medical oncology for lung cancer  and should be scheduled within 7 days but after 04/22/25 .  Please notify me if not scheduled within time frame.    Referral received/ Chart reviewed for work up completed     Imaging completed:    [] PET/CT   [] MRI   [x] CT soft tissue neck w contrast 03/07/25 (PACS)    [] US   [] Mammo   [] Bone scan   [] N/A    [facility]    Pathology completed: [facility]    Date: 04/09/25   Location: Right cervical lymph node    []N/A    Additional records needed:    [] Genomic report   [] Genetic testing results   [] Office Note   [] Procedure   [] Lab results   [] N/A    PFT's scheduled 05/15/25    [] Radiation Oncology records retrieval needed (PN to route to rad/onc clerical pool once scheduled)  Date:  Location:        Hx of Melanoma low grade intra ocular left iris - Monique - No chemo or RT.

## 2025-04-14 NOTE — TELEPHONE ENCOUNTER
Can you please contact PCP office and let her know ECG today and 3/30/2025 do not show any significant QT prolongation. I do think it would be ok for intermittent Zofran use as needed. I recommend serial ECG's after starting Zofran : 1 week after starting then monthly based on results.

## 2025-04-15 ENCOUNTER — TELEPHONE (OUTPATIENT)
Age: 70
End: 2025-04-15

## 2025-04-15 NOTE — ASSESSMENT & PLAN NOTE
Recently confirmed by lymph node biopsy  Following with [facility] pulmonology and referred to hem/onc  Scheduled for PET-CT and MRI this week

## 2025-04-15 NOTE — TELEPHONE ENCOUNTER
Mery from [facility] / Dr. Huerta office / Phone [phone]    Asking if pulmonary provider can review the sputum culture and fungus culture pt had submitted  ASAP / March 14th / Mery stating it might be in [facility]/ but she is also faxing the results to central pulmonary fax #    Mery stating results are showing for + Yeast and no white blood cells.     After pulm provider reviews these tests Dr. Huerta asking for a call back with input.

## 2025-04-15 NOTE — ASSESSMENT & PLAN NOTE
Goal LDL < 70  Currently struggling to swallow 80 mg atorvastatin.  Transition to rosuvastatin 40 mg daily.  Has lab work orders to monitor renal function.  Plan updated lipid in 3 months.

## 2025-04-15 NOTE — ASSESSMENT & PLAN NOTE
A. MINOCA (MI with no obstructive coronary disease) 2/10/2025  B. Cardiac cath 2/11/2025 with MLI in the LAD with tortuous distal LAD  C. Echo 2/10/2025 with EF 65% with basal inferior hypo to akinesis  - - - - -  Currently without angina.  Continue aspirin 81 mg daily (enteric coated).  Continue high intensity statin. Transition to rosuvastatin 40 mg daily as she is having trouble swallowing the large 80 mg atorvastatin.  Continue carvedilol 6.25 mg BID (can up-titrate as needed to control heart rates).

## 2025-04-15 NOTE — ASSESSMENT & PLAN NOTE
Continues to smoke about 4 cig daily. Strongly encouraged complete cessation. She does have patches at home to use.

## 2025-04-15 NOTE — ASSESSMENT & PLAN NOTE
History of heart racing episodes  14 day ZIO 2/17-3/1/25 shows 11 SVT episodes with longest 14 beats.  Continue carvedilol 6.25 mg BID. Can up-titrate as needed.

## 2025-04-16 ENCOUNTER — TELEPHONE (OUTPATIENT)
Dept: PULMONOLOGY | Facility: CLINIC | Age: 70
End: 2025-04-16

## 2025-04-16 ENCOUNTER — TELEPHONE (OUTPATIENT)
Age: 70
End: 2025-04-16

## 2025-04-16 DIAGNOSIS — R05.3 CHRONIC COUGH: Primary | ICD-10-CM

## 2025-04-16 DIAGNOSIS — J42 CHRONIC BRONCHITIS, UNSPECIFIED CHRONIC BRONCHITIS TYPE (HCC): ICD-10-CM

## 2025-04-16 RX ORDER — BUDESONIDE, GLYCOPYRROLATE, AND FORMOTEROL FUMARATE 160; 9; 4.8 UG/1; UG/1; UG/1
2 AEROSOL, METERED RESPIRATORY (INHALATION) 2 TIMES DAILY
Qty: 10.7 G | Refills: 3 | Status: SHIPPED | OUTPATIENT
Start: 2025-04-16 | End: 2025-04-23

## 2025-04-16 RX ORDER — BENZONATATE 200 MG/1
200 CAPSULE ORAL 3 TIMES DAILY PRN
Qty: 20 CAPSULE | Refills: 0 | Status: SHIPPED | OUTPATIENT
Start: 2025-04-16

## 2025-04-16 NOTE — TELEPHONE ENCOUNTER
is requesting a refill on predniSONE 10 mg tablet  Patient is starting to get some of the symptoms back that she had before she had started the medication.     [id number] - [place], [place] - [address] [place] [address]

## 2025-04-17 ENCOUNTER — PATIENT OUTREACH (OUTPATIENT)
Dept: HEMATOLOGY ONCOLOGY | Facility: CLINIC | Age: 70
End: 2025-04-17

## 2025-04-17 ENCOUNTER — TELEPHONE (OUTPATIENT)
Dept: PULMONOLOGY | Facility: CLINIC | Age: 70
End: 2025-04-17

## 2025-04-17 DIAGNOSIS — J42 CHRONIC BRONCHITIS, UNSPECIFIED CHRONIC BRONCHITIS TYPE (HCC): Primary | ICD-10-CM

## 2025-04-17 RX ORDER — PREDNISONE 10 MG/1
TABLET ORAL
Qty: 30 TABLET | Refills: 0 | Status: SHIPPED | OUTPATIENT
Start: 2025-04-17 | End: 2025-04-29

## 2025-04-17 NOTE — TELEPHONE ENCOUNTER
Tried to call Alyssa regarding her cough.  Also attempted to call Mery at PCP office regarding finding of +2 yeast on prior fungal respiratory culture - no answer / call did not go thru.

## 2025-04-17 NOTE — TELEPHONE ENCOUNTER
Called patient to discuss.  She states that since being off steroids her cough is returned.  Reports increased mucus and increased wheezing.  Will restart prednisone taper and I encouraged her to continue use of Mucinex and Tessalon Perles.  She verbalized understanding and agrees to the plan.

## 2025-04-17 NOTE — TELEPHONE ENCOUNTER
Spoke with patient to advise that tessalon perles were sent to her pharmacy as well as the breztri. She said that she had tried the tessalon perles a few weeks ago and they do not work for her. She is asking if something else can be ordered.

## 2025-04-17 NOTE — PROGRESS NOTES
Intake received, chart reviewed for need of external records.  Consulting: Dr. Lopez  Scheduled on 4/30/25  Dx: Lung   ICD: C34.91    Images requested:Pet Scan From 4/17/25  [facility] [phone], via fax [phone]  If not uploaded electronically via [employer], disks will be sent directly to the Radiology Reading Room.

## 2025-04-22 ENCOUNTER — DOCUMENTATION (OUTPATIENT)
Dept: HEMATOLOGY ONCOLOGY | Facility: CLINIC | Age: 70
End: 2025-04-22

## 2025-04-22 PROBLEM — T45.1X5A CHEMOTHERAPY INDUCED NAUSEA AND VOMITING: Status: ACTIVE | Noted: 2025-04-22

## 2025-04-22 PROBLEM — R11.2 CHEMOTHERAPY INDUCED NAUSEA AND VOMITING: Status: ACTIVE | Noted: 2025-04-22

## 2025-04-22 PROBLEM — C34.12 PRIMARY ADENOCARCINOMA OF UPPER LOBE OF LEFT LUNG (HCC): Chronic | Status: ACTIVE | Noted: 2025-04-14

## 2025-04-23 ENCOUNTER — RESULTS FOLLOW-UP (OUTPATIENT)
Dept: PULMONOLOGY | Facility: CLINIC | Age: 70
End: 2025-04-23

## 2025-04-23 ENCOUNTER — HOSPITAL ENCOUNTER (OUTPATIENT)
Dept: MRI IMAGING | Facility: HOSPITAL | Age: 70
Discharge: HOME | End: 2025-04-23
Attending: NURSE PRACTITIONER
Payer: MEDICARE

## 2025-04-23 ENCOUNTER — TELEPHONE (OUTPATIENT)
Dept: PULMONOLOGY | Facility: CLINIC | Age: 70
End: 2025-04-23

## 2025-04-23 DIAGNOSIS — R05.3 CHRONIC COUGH: Primary | ICD-10-CM

## 2025-04-23 DIAGNOSIS — C80.1 ADENOCARCINOMA (HCC): ICD-10-CM

## 2025-04-23 PROCEDURE — A9585 GADOBUTROL INJECTION: HCPCS | Performed by: NURSE PRACTITIONER

## 2025-04-23 PROCEDURE — 70553 MRI BRAIN STEM W/O & W/DYE: CPT

## 2025-04-23 RX ORDER — FLUTICASONE FUROATE, UMECLIDINIUM BROMIDE AND VILANTEROL TRIFENATATE 200; 62.5; 25 UG/1; UG/1; UG/1
1 POWDER RESPIRATORY (INHALATION) DAILY
Qty: 60 BLISTER | Refills: 0 | Status: SHIPPED | OUTPATIENT
Start: 2025-04-23 | End: 2025-05-23

## 2025-04-23 RX ORDER — GADOBUTROL 604.72 MG/ML
4 INJECTION INTRAVENOUS
Status: COMPLETED | OUTPATIENT
Start: 2025-04-23 | End: 2025-04-23

## 2025-04-23 RX ADMIN — GADOBUTROL 4 ML: 604.72 INJECTION INTRAVENOUS at 10:29

## 2025-04-23 NOTE — TELEPHONE ENCOUNTER
I gave MRI results.    She notes that Breztri is not covered on her formulary and would be $400 per month. I will try sending Trelegy to pharmacy 1 inhalation daily. If this med is not covered patient may benefit from calling insurance to determine what is on formulary (Advair?).

## 2025-04-24 ENCOUNTER — PATIENT OUTREACH (OUTPATIENT)
Dept: HEMATOLOGY ONCOLOGY | Facility: CLINIC | Age: 70
End: 2025-04-24

## 2025-04-24 NOTE — PROGRESS NOTES
"Patient left message on ONN voicemail.    \"Hi, Daria, it's Corie Demarco. I'm just touching base. I have an appointment with oncology on April 30th. All of my tests are back that I needed to have completed and I was wondering if you could check into it to see if I can be seen sooner. If not, we'll just keep that appointment. Just give me a call back. Thank you.\"    Routing to medical leadership to see if we can move up appointment.   "

## 2025-04-25 ENCOUNTER — CONSULT (OUTPATIENT)
Dept: HEMATOLOGY ONCOLOGY | Facility: CLINIC | Age: 70
End: 2025-04-25
Attending: NURSE PRACTITIONER
Payer: MEDICARE

## 2025-04-25 ENCOUNTER — TELEPHONE (OUTPATIENT)
Dept: FAMILY MEDICINE CLINIC | Facility: CLINIC | Age: 70
End: 2025-04-25

## 2025-04-25 VITALS
SYSTOLIC BLOOD PRESSURE: 124 MMHG | TEMPERATURE: 98.7 F | HEIGHT: 67 IN | WEIGHT: 100 LBS | BODY MASS INDEX: 15.7 KG/M2 | HEART RATE: 87 BPM | OXYGEN SATURATION: 98 % | DIASTOLIC BLOOD PRESSURE: 68 MMHG

## 2025-04-25 DIAGNOSIS — C34.12 PRIMARY ADENOCARCINOMA OF UPPER LOBE OF LEFT LUNG (HCC): Primary | Chronic | ICD-10-CM

## 2025-04-25 DIAGNOSIS — R91.8 ABNORMAL CT SCAN OF LUNG: ICD-10-CM

## 2025-04-25 PROCEDURE — G2211 COMPLEX E/M VISIT ADD ON: HCPCS | Performed by: INTERNAL MEDICINE

## 2025-04-25 PROCEDURE — 99205 OFFICE O/P NEW HI 60 MIN: CPT | Performed by: INTERNAL MEDICINE

## 2025-04-25 RX ORDER — CYANOCOBALAMIN 1000 UG/ML
1000 INJECTION, SOLUTION INTRAMUSCULAR; SUBCUTANEOUS ONCE
Status: CANCELLED | OUTPATIENT
Start: 2025-04-28 | End: 2025-04-25

## 2025-04-25 RX ORDER — FOLIC ACID 1 MG/1
1000 TABLET ORAL DAILY
Qty: 30 TABLET | Refills: 5 | Status: SHIPPED | OUTPATIENT
Start: 2025-04-25

## 2025-04-25 RX ORDER — ONDANSETRON 4 MG/1
1 TABLET, FILM COATED ORAL EVERY 8 HOURS PRN
COMMUNITY
Start: 2025-04-15

## 2025-04-25 RX ORDER — ONDANSETRON 8 MG/1
8 TABLET, FILM COATED ORAL EVERY 8 HOURS PRN
Qty: 30 TABLET | Refills: 3 | Status: SHIPPED | OUTPATIENT
Start: 2025-04-25

## 2025-04-25 NOTE — PROGRESS NOTES
Oncology Teach:   Review of Plan:  Treatment Plan Reviewed    Notes:  Carboplatin, Alimta, Keytruda Q21 days    Review of Pre-Treatment Needs:  Lab work frequency reviewed    PICC or Port Placement Needs:  Not needed    Expectations at First Appointment Reviewed:  Yes    Patient Medication Education Reviewed:  Yes    Supportive Medication Reviewed:  Rx meds reviewed    Review when to call office vs. present to ED:  Magnet provided and Yes    Provided Oncology Access Center Number:  Yes    Assessment of patient understanding:  Pt demonstrates understanding    Chemo consent obtained?:  Yes

## 2025-04-25 NOTE — ASSESSMENT & PLAN NOTE
Newly diagnosed adenocarcinoma lung, locally advanced.  We reviewed that medical therapy is applicable.  For this purpose, I would favor pemetrexed, carboplatin, pembrolizumab.  We reviewed potential toxicities of his program including but not limited to nausea, vomiting, stomatitis, diarrhea, cytopenias, fatigue, colitis, dermatitis, hypothyroidism.  We reviewed that other organs can be affected although less commonly.  Toxicities are generally considered to be reversible although rare cases can be severe or life-threatening.  We reviewed prophylactic measures taken routinely as well as monitoring to allow for early intervention as appropriate.  Blood work on 3/30/2025 is acceptable for baseline.  Our chemotherapy nurse reviewed and provided written materials regarding these medications.  We reviewed that the goal of treatment is disease control, palliative benefit, survival benefit.  Long-term survival can be achieved in a minority of patients.  The patient and her  voiced understanding above and are agreeable to proceed as outlined.  We made arrangements accordingly.  Orders:  •  ondansetron (ZOFRAN) 8 mg tablet; Take 1 tablet (8 mg total) by mouth every 8 (eight) hours as needed for nausea  •  folic acid (FOLVITE) 1 mg tablet; Take 1 tablet (1,000 mcg total) by mouth daily  •  Infusion Appointment Request; Future  •  Infusion Calculated Appointment Request; Future  •  CBC and differential; Future  •  Comprehensive metabolic panel; Future  •  TSH, 3rd generation with Free T4 reflex; Future  •  T3, free; Future  •  Infusion Calculated Appointment Request; Future  •  CBC and differential; Future  •  Comprehensive metabolic panel; Future  •  TSH, 3rd generation with Free T4 reflex; Future  •  T3, free; Future  •  Infusion Calculated Appointment Request; Future  •  CBC and differential; Future  •  Comprehensive metabolic panel; Future  •  TSH, 3rd generation with Free T4 reflex; Future  •  T3, free; Future  •   Infusion Calculated Appointment Request; Future  •  CBC and differential; Future  •  Comprehensive metabolic panel; Future  •  TSH, 3rd generation with Free T4 reflex; Future  •  T3, free; Future

## 2025-04-25 NOTE — TELEPHONE ENCOUNTER
Pt needs to be set up for b12 shot Monday at [facility] 4/28 pt needs the latest appt possible for vitamin b injection.    She also needs to set up chemo infusions at [facility]

## 2025-04-25 NOTE — PROGRESS NOTES
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  We reviewed that medical therapy is applicable.  For this purpose, I would favor pemetrexed, carboplatin, pembrolizumab.  We reviewed potential toxicities of his program including but not limited to nausea, vomiting, stomatitis, diarrhea, cytopenias, fatigue, colitis, dermatitis, hypothyroidism.  We reviewed that other organs can be affected although less commonly.  Toxicities are generally considered to be reversible although rare cases can be severe or life-threatening.  We reviewed prophylactic measures taken routinely as well as monitoring to allow for early intervention as appropriate.  Blood work on 3/30/2025 is acceptable for baseline.  Our chemotherapy nurse reviewed and provided written materials regarding these medications.  We reviewed that the goal of treatment is disease control, palliative benefit, survival benefit.  Long-term survival can be achieved in a minority of patients.  The patient and her  voiced understanding above and are agreeable to proceed as outlined.  We made arrangements accordingly.  Orders:  •  ondansetron (ZOFRAN) 8 mg tablet; Take 1 tablet (8 mg total) by mouth every 8 (eight) hours as needed for nausea  •  folic acid (FOLVITE) 1 mg tablet; Take 1 tablet (1,000 mcg total) by mouth daily  •  Infusion Appointment Request; Future  •  Infusion Calculated Appointment Request; Future  •  CBC and differential; Future  •  Comprehensive metabolic panel; Future  •  TSH, 3rd generation with Free T4 reflex; Future  •  T3, free; Future  •  Infusion Calculated Appointment Request; Future  •  CBC and differential; Future  •  Comprehensive metabolic  panel; Future  •  TSH, 3rd generation with Free T4 reflex; Future  •  T3, free; Future  •  Infusion Calculated Appointment Request; Future  •  CBC and differential; Future  •  Comprehensive metabolic panel; Future  •  TSH, 3rd generation with Free T4 reflex; Future  •  T3, free; Future  •  Infusion Calculated Appointment Request; Future  •  CBC and differential; Future  •  Comprehensive metabolic panel; Future  •  TSH, 3rd generation with Free T4 reflex; Future  •  T3, free; Future        Return in about 4 weeks (around 5/23/2025) for Infusion - See Treatment Plan, Labs - See Treatment Plan.    History of Present Illness   No chief complaint on file.    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  4/17/2025 PET/CT showed left perihilar masslike consolidation, bilateral cervical and supraclavicular hypermetabolic lymphadenopathy extending into the mediastinum.  MRI brain on 4/23/2025 shows no brain metastases.       Review of Systems   Constitutional:  Negative for appetite change, diaphoresis, fatigue and fever.   HENT:   "Negative for sinus pain.    Eyes:  Negative for discharge.   Respiratory:  Negative for cough and shortness of breath.    Cardiovascular:  Negative for chest pain.   Gastrointestinal:  Negative for abdominal pain, constipation and diarrhea.   Endocrine: Negative for cold intolerance.   Genitourinary:  Negative for difficulty urinating and hematuria.   Musculoskeletal:  Negative for joint swelling.   Skin:  Negative for rash.   Allergic/Immunologic: Negative for environmental allergies.   Neurological:  Negative for dizziness and headaches.   Hematological:  Negative for adenopathy.   Psychiatric/Behavioral:  Negative for agitation.            Objective   /68 (BP Location: Left arm, Patient Position: Sitting, Cuff Size: Standard)   Pulse 87   Temp 98.7 °F (37.1 °C) (Temporal)   Ht 5' 7\" (1.702 m)   Wt 45.4 kg (100 lb)   SpO2 98%   BMI 15.66 kg/m²     Pain Screening:     ECOG ECOG Performance Status: 1 - Restricted in physically strenuous activity but ambulatory and able to carry out work of a light or sedentary nature, e.g., light house work, office work   Physical Exam  Constitutional:       Appearance: She is well-developed.   HENT:      Head: Normocephalic and atraumatic.   Eyes:      Pupils: Pupils are equal, round, and reactive to light.   Cardiovascular:      Rate and Rhythm: Normal rate.      Heart sounds: No murmur heard.  Pulmonary:      Effort: No respiratory distress.      Breath sounds: No wheezing or rales.   Abdominal:      General: There is no distension.      Palpations: Abdomen is soft.      Tenderness: There is no abdominal tenderness. There is no rebound.   Musculoskeletal:         General: No tenderness.      Cervical back: Neck supple.   Lymphadenopathy:      Cervical: No cervical adenopathy.   Skin:     General: Skin is warm.      Findings: No rash.   Neurological:      Mental Status: She is alert and oriented to person, place, and time.      Deep Tendon Reflexes: Reflexes normal. "   Psychiatric:         Thought Content: Thought content normal.         Labs: I have reviewed the following labs:  Lab Results   Component Value Date/Time    WBC 7.32 03/30/2025 02:19 PM    RBC 4.77 03/30/2025 02:19 PM    Hemoglobin 15.3 03/30/2025 02:19 PM    Hematocrit 46.7 (H) 03/30/2025 02:19 PM    MCV 98 03/30/2025 02:19 PM    MCH 32.1 03/30/2025 02:19 PM    RDW 13.1 03/30/2025 02:19 PM    Platelets 304 03/30/2025 02:19 PM    Segmented % 79 (H) 03/30/2025 02:19 PM    Lymphocytes % 8 (L) 03/30/2025 02:19 PM    Monocytes % 8 03/30/2025 02:19 PM    Eosinophils Relative 4 03/30/2025 02:19 PM    Basophils Relative 1 03/30/2025 02:19 PM    Immature Grans % 0 03/30/2025 02:19 PM    Absolute Neutrophils 5.69 03/30/2025 02:19 PM     Lab Results   Component Value Date/Time    Potassium 3.9 03/30/2025 02:19 PM    Potassium 4.2 02/28/2025 10:30 AM    Chloride 101 03/30/2025 02:19 PM    Chloride 98 02/28/2025 10:30 AM    Carbon Dioxide 25 02/28/2025 10:30 AM    CO2 29 03/30/2025 02:19 PM    BUN 7 03/30/2025 02:19 PM    BUN 7 02/28/2025 10:30 AM    Creatinine 0.43 (L) 03/30/2025 02:19 PM    Creatinine 0.4 (L) 02/28/2025 10:30 AM    Calcium 8.7 03/30/2025 02:19 PM    Calcium 9.3 02/28/2025 10:30 AM    AST 24 03/30/2025 02:19 PM    ALT 20 03/30/2025 02:19 PM    Alkaline Phosphatase 83 03/30/2025 02:19 PM    Total Protein 6.4 03/30/2025 02:19 PM    Albumin 3.9 03/30/2025 02:19 PM    Total Bilirubin 0.76 03/30/2025 02:19 PM    EGFR >90 02/28/2025 10:30 AM    eGFR 104 03/30/2025 02:19 PM

## 2025-04-28 ENCOUNTER — HOSPITAL ENCOUNTER (OUTPATIENT)
Dept: INFUSION CENTER | Facility: HOSPITAL | Age: 70
Discharge: HOME | End: 2025-04-28
Attending: INTERNAL MEDICINE
Payer: MEDICARE

## 2025-04-28 DIAGNOSIS — C34.12 PRIMARY ADENOCARCINOMA OF UPPER LOBE OF LEFT LUNG (HCC): Primary | Chronic | ICD-10-CM

## 2025-04-28 PROCEDURE — 96372 THER/PROPH/DIAG INJ SC/IM: CPT

## 2025-04-28 RX ORDER — CYANOCOBALAMIN 1000 UG/ML
1000 INJECTION, SOLUTION INTRAMUSCULAR; SUBCUTANEOUS ONCE
Status: COMPLETED | OUTPATIENT
Start: 2025-04-28 | End: 2025-04-28

## 2025-04-28 RX ADMIN — CYANOCOBALAMIN 1000 MCG: 1000 INJECTION, SOLUTION INTRAMUSCULAR; SUBCUTANEOUS at 15:48

## 2025-04-28 NOTE — PROGRESS NOTES
Corie Pal  tolerated B12 injection well with no complications.      Corie Pal is aware of future appt on 5/2/25 at 0800 in [place].     AVS printed and given to Corie Pal:  Yes

## 2025-04-29 RX ORDER — SODIUM CHLORIDE 9 MG/ML
20 INJECTION, SOLUTION INTRAVENOUS ONCE
Status: CANCELLED | OUTPATIENT
Start: 2025-05-02

## 2025-05-01 ENCOUNTER — PATIENT OUTREACH (OUTPATIENT)
Dept: HEMATOLOGY ONCOLOGY | Facility: CLINIC | Age: 70
End: 2025-05-01

## 2025-05-01 NOTE — PROGRESS NOTES
I called patient to introduce myself as her patient navigator. Patient declined doing Distress Thermometer. She stated she has all the doctors she needs. She has no needs for anything , gave example of how she does not need transportation, her  takes her and will not even let her go for blood work by herself. She did not want to talk. I gave her my direct number if she need anything in the future.

## 2025-05-01 NOTE — PROGRESS NOTES
Per madeline Wood to use labs from 3/30 as baseline for treatment. Will draw TSH and T3 baseline prior to treatment and proceed.

## 2025-05-02 ENCOUNTER — HOSPITAL ENCOUNTER (OUTPATIENT)
Dept: INFUSION CENTER | Facility: HOSPITAL | Age: 70
End: 2025-05-02
Attending: INTERNAL MEDICINE
Payer: MEDICARE

## 2025-05-02 VITALS
OXYGEN SATURATION: 97 % | DIASTOLIC BLOOD PRESSURE: 74 MMHG | RESPIRATION RATE: 20 BRPM | HEIGHT: 67 IN | WEIGHT: 100.97 LBS | BODY MASS INDEX: 15.85 KG/M2 | SYSTOLIC BLOOD PRESSURE: 172 MMHG | HEART RATE: 79 BPM | TEMPERATURE: 96.9 F

## 2025-05-02 DIAGNOSIS — C34.12 PRIMARY ADENOCARCINOMA OF UPPER LOBE OF LEFT LUNG (HCC): Primary | Chronic | ICD-10-CM

## 2025-05-02 LAB
T3FREE SERPL-MCNC: 3.19 PG/ML (ref 2.5–3.9)
TSH SERPL DL<=0.05 MIU/L-ACNC: 3.13 UIU/ML (ref 0.45–4.5)

## 2025-05-02 PROCEDURE — 96417 CHEMO IV INFUS EACH ADDL SEQ: CPT

## 2025-05-02 PROCEDURE — 96413 CHEMO IV INFUSION 1 HR: CPT

## 2025-05-02 PROCEDURE — 84443 ASSAY THYROID STIM HORMONE: CPT | Performed by: INTERNAL MEDICINE

## 2025-05-02 PROCEDURE — 96367 TX/PROPH/DG ADDL SEQ IV INF: CPT

## 2025-05-02 PROCEDURE — 84481 FREE ASSAY (FT-3): CPT | Performed by: INTERNAL MEDICINE

## 2025-05-02 PROCEDURE — 96411 CHEMO IV PUSH ADDL DRUG: CPT

## 2025-05-02 RX ORDER — SODIUM CHLORIDE 9 MG/ML
20 INJECTION, SOLUTION INTRAVENOUS ONCE
Status: COMPLETED | OUTPATIENT
Start: 2025-05-02 | End: 2025-05-02

## 2025-05-02 RX ADMIN — SODIUM CHLORIDE 200 MG: 9 INJECTION, SOLUTION INTRAVENOUS at 10:37

## 2025-05-02 RX ADMIN — SODIUM CHLORIDE 20 ML/HR: 0.9 INJECTION, SOLUTION INTRAVENOUS at 09:01

## 2025-05-02 RX ADMIN — FOSAPREPITANT 150 MG: 150 INJECTION, POWDER, LYOPHILIZED, FOR SOLUTION INTRAVENOUS at 09:30

## 2025-05-02 RX ADMIN — DEXAMETHASONE SODIUM PHOSPHATE: 10 INJECTION, SOLUTION INTRAMUSCULAR; INTRAVENOUS at 09:02

## 2025-05-02 RX ADMIN — CARBOPLATIN 400.5 MG: 600 INJECTION, SOLUTION INTRAVENOUS at 11:39

## 2025-05-02 RX ADMIN — PEMETREXED DISODIUM 755 MG: 500 INJECTION, POWDER, LYOPHILIZED, FOR SOLUTION INTRAVENOUS at 11:27

## 2025-05-02 NOTE — PROGRESS NOTES
Corie Pal  tolerated keytruda, alimta and carboplatin treatment well with no complications.      Corie Pal is aware of future appt on 5/23 1100    AVS printed and given to Corie Pal:  No (Declined by Corie Pal)

## 2025-05-04 ENCOUNTER — TELEPHONE (OUTPATIENT)
Dept: OTHER | Facility: OTHER | Age: 70
End: 2025-05-04

## 2025-05-04 ENCOUNTER — APPOINTMENT (EMERGENCY)
Dept: CT IMAGING | Facility: HOSPITAL | Age: 70
End: 2025-05-04
Payer: MEDICARE

## 2025-05-04 ENCOUNTER — APPOINTMENT (EMERGENCY)
Dept: NON INVASIVE DIAGNOSTICS | Facility: HOSPITAL | Age: 70
End: 2025-05-04
Payer: MEDICARE

## 2025-05-04 ENCOUNTER — HOSPITAL ENCOUNTER (EMERGENCY)
Facility: HOSPITAL | Age: 70
Discharge: HOME | End: 2025-05-04
Attending: EMERGENCY MEDICINE
Payer: MEDICARE

## 2025-05-04 ENCOUNTER — TELEPHONE (OUTPATIENT)
Dept: HEMATOLOGY ONCOLOGY | Facility: CLINIC | Age: 70
End: 2025-05-04

## 2025-05-04 ENCOUNTER — APPOINTMENT (EMERGENCY)
Dept: RADIOLOGY | Facility: HOSPITAL | Age: 70
End: 2025-05-04
Payer: MEDICARE

## 2025-05-04 VITALS
TEMPERATURE: 97.8 F | HEART RATE: 66 BPM | BODY MASS INDEX: 15.7 KG/M2 | WEIGHT: 100 LBS | OXYGEN SATURATION: 94 % | SYSTOLIC BLOOD PRESSURE: 140 MMHG | HEIGHT: 67 IN | RESPIRATION RATE: 21 BRPM | DIASTOLIC BLOOD PRESSURE: 60 MMHG

## 2025-05-04 DIAGNOSIS — S93.602A FOOT SPRAIN, LEFT, INITIAL ENCOUNTER: Primary | ICD-10-CM

## 2025-05-04 DIAGNOSIS — R60.0 LOWER LEG EDEMA: ICD-10-CM

## 2025-05-04 LAB
ALBUMIN SERPL BCG-MCNC: 4 G/DL (ref 3.5–5)
ALP SERPL-CCNC: 55 U/L (ref 34–104)
ALT SERPL W P-5'-P-CCNC: 51 U/L (ref 7–52)
ANION GAP SERPL CALCULATED.3IONS-SCNC: 6 MMOL/L (ref 4–13)
AST SERPL W P-5'-P-CCNC: 61 U/L (ref 13–39)
BASOPHILS # BLD AUTO: 0.03 THOUSANDS/ÂΜL (ref 0–0.1)
BASOPHILS NFR BLD AUTO: 0 % (ref 0–1)
BILIRUB SERPL-MCNC: 0.58 MG/DL (ref 0.2–1)
BNP SERPL-MCNC: 66 PG/ML (ref 0–100)
BUN SERPL-MCNC: 9 MG/DL (ref 5–25)
CALCIUM SERPL-MCNC: 8.6 MG/DL (ref 8.4–10.2)
CARDIAC TROPONIN I PNL SERPL HS: 4 NG/L (ref ?–50)
CHLORIDE SERPL-SCNC: 99 MMOL/L (ref 96–108)
CO2 SERPL-SCNC: 30 MMOL/L (ref 21–32)
CREAT SERPL-MCNC: 0.43 MG/DL (ref 0.6–1.3)
EOSINOPHIL # BLD AUTO: 0.35 THOUSAND/ÂΜL (ref 0–0.61)
EOSINOPHIL NFR BLD AUTO: 4 % (ref 0–6)
ERYTHROCYTE [DISTWIDTH] IN BLOOD BY AUTOMATED COUNT: 13.8 % (ref 11.6–15.1)
GFR SERPL CREATININE-BSD FRML MDRD: 104 ML/MIN/1.73SQ M
GLUCOSE SERPL-MCNC: 87 MG/DL (ref 65–140)
HCT VFR BLD AUTO: 43 % (ref 34.8–46.1)
HGB BLD-MCNC: 14.3 G/DL (ref 11.5–15.4)
IMM GRANULOCYTES # BLD AUTO: 0.06 THOUSAND/UL (ref 0–0.2)
IMM GRANULOCYTES NFR BLD AUTO: 1 % (ref 0–2)
LYMPHOCYTES # BLD AUTO: 0.63 THOUSANDS/ÂΜL (ref 0.6–4.47)
LYMPHOCYTES NFR BLD AUTO: 7 % (ref 14–44)
MAGNESIUM SERPL-MCNC: 1.9 MG/DL (ref 1.9–2.7)
MCH RBC QN AUTO: 32.9 PG (ref 26.8–34.3)
MCHC RBC AUTO-ENTMCNC: 33.3 G/DL (ref 31.4–37.4)
MCV RBC AUTO: 99 FL (ref 82–98)
MONOCYTES # BLD AUTO: 0.33 THOUSAND/ÂΜL (ref 0.17–1.22)
MONOCYTES NFR BLD AUTO: 4 % (ref 4–12)
NEUTROPHILS # BLD AUTO: 7.59 THOUSANDS/ÂΜL (ref 1.85–7.62)
NEUTS SEG NFR BLD AUTO: 84 % (ref 43–75)
NRBC BLD AUTO-RTO: 0 /100 WBCS
PLATELET # BLD AUTO: 298 THOUSANDS/UL (ref 149–390)
PMV BLD AUTO: 9 FL (ref 8.9–12.7)
POTASSIUM SERPL-SCNC: 3.9 MMOL/L (ref 3.5–5.3)
PROT SERPL-MCNC: 6 G/DL (ref 6.4–8.4)
RBC # BLD AUTO: 4.35 MILLION/UL (ref 3.81–5.12)
SODIUM SERPL-SCNC: 135 MMOL/L (ref 135–147)
WBC # BLD AUTO: 8.99 THOUSAND/UL (ref 4.31–10.16)

## 2025-05-04 PROCEDURE — 93970 EXTREMITY STUDY: CPT

## 2025-05-04 PROCEDURE — 93005 ELECTROCARDIOGRAM TRACING: CPT

## 2025-05-04 PROCEDURE — 99285 EMERGENCY DEPT VISIT HI MDM: CPT

## 2025-05-04 PROCEDURE — 71275 CT ANGIOGRAPHY CHEST: CPT

## 2025-05-04 PROCEDURE — 80053 COMPREHEN METABOLIC PANEL: CPT | Performed by: PHYSICIAN ASSISTANT

## 2025-05-04 PROCEDURE — 36415 COLL VENOUS BLD VENIPUNCTURE: CPT | Performed by: PHYSICIAN ASSISTANT

## 2025-05-04 PROCEDURE — 73630 X-RAY EXAM OF FOOT: CPT

## 2025-05-04 PROCEDURE — 83880 ASSAY OF NATRIURETIC PEPTIDE: CPT | Performed by: PHYSICIAN ASSISTANT

## 2025-05-04 PROCEDURE — 99285 EMERGENCY DEPT VISIT HI MDM: CPT | Performed by: PHYSICIAN ASSISTANT

## 2025-05-04 PROCEDURE — 84484 ASSAY OF TROPONIN QUANT: CPT | Performed by: PHYSICIAN ASSISTANT

## 2025-05-04 PROCEDURE — 85025 COMPLETE CBC W/AUTO DIFF WBC: CPT | Performed by: PHYSICIAN ASSISTANT

## 2025-05-04 PROCEDURE — 83735 ASSAY OF MAGNESIUM: CPT | Performed by: PHYSICIAN ASSISTANT

## 2025-05-04 PROCEDURE — 73610 X-RAY EXAM OF ANKLE: CPT

## 2025-05-04 RX ORDER — OXYCODONE HYDROCHLORIDE 5 MG/1
5 TABLET ORAL EVERY 6 HOURS PRN
Qty: 12 TABLET | Refills: 0 | Status: SHIPPED | OUTPATIENT
Start: 2025-05-04

## 2025-05-04 RX ORDER — ACETAMINOPHEN 325 MG/1
975 TABLET ORAL ONCE
Status: COMPLETED | OUTPATIENT
Start: 2025-05-04 | End: 2025-05-04

## 2025-05-04 RX ADMIN — IOHEXOL 77 ML: 350 INJECTION, SOLUTION INTRAVENOUS at 13:34

## 2025-05-04 RX ADMIN — ACETAMINOPHEN 975 MG: 325 TABLET ORAL at 13:51

## 2025-05-04 NOTE — TELEPHONE ENCOUNTER
Patient called answering service to report swelling in her lower extremity.  Patient states she fell on Friday and has significant left foot swelling and is barely able to walk.  States she skipped a step leading to the fall.  Chart reviewed.  Patient received chemotherapy with carboplatin, pemetrexed and pembrolizumab on 5/2/2025 for lung adenocarcinoma.  Patient planning to go to the ER for evaluation.

## 2025-05-04 NOTE — ED PROVIDER NOTES
Time reflects when diagnosis was documented in both MDM as applicable and the Disposition within this note       Time User Action Codes Description Comment    5/4/2025  2:41 PM Rajendra Juarez [S93.602A] Foot sprain, left, initial encounter     5/4/2025  2:41 PM Rajendra Juarez [R60.0] Lower leg edema           ED Disposition       ED Disposition   Discharge    Condition   Stable    Date/Time   Sun May 4, 2025  2:41 PM    Comment   Corie Pal discharge to home/self care.                   Assessment & Plan       Medical Decision Making  The patient is a 69-year-old female with a current history of lung cancer receiving treatment who presents with a chief complaint of lower extremity swelling, left foot injury.  The patient states that her first chemo treatment was 3 days ago.  States it was done through a peripheral IV.  Patient states that she has had worsening swelling over the bilateral lower extremities.  She states she has never had swelling to this extent.  She states that her breathing seems to be baseline for her.  She states that 3 days ago she did miss the last step and overturned her left foot which is painful and swollen.  She denies any fevers chills.  Is nauseous but this is baseline for her.    Patient had lower leg edema but left was greater than right no DVT found.  Lab work overall unremarkable and imaging studies did not reveal any acute etiology did see the patient's metastatic disease.    Patient's EKG was unremarkable for any acute ischemic findings.  No PE on CAT scan.  The patient's left foot was not found to have any fractures but diagnosed with sprain will refer and follow-up with Ortho given walking boot for supportive care.    Differential diagnosis included but was not limited to :Pneumonia, Sinusitis, URI, ACS, GERD, PE, Viral syndrome, mets, chemo induced symptoms       Amount and/or Complexity of Data Reviewed  Labs: ordered. Decision-making details documented in ED  Course.  Radiology: ordered and independent interpretation performed. Decision-making details documented in ED Course.  ECG/medicine tests: ordered and independent interpretation performed. Decision-making details documented in ED Course.    Risk  OTC drugs.  Prescription drug management.        ED Course as of 05/04/25 1526   Sun May 04, 2025   1438 No DVT        Medications   iohexol (OMNIPAQUE) 350 MG/ML injection (MULTI-DOSE) 77 mL (77 mL Intravenous Given 5/4/25 1334)   acetaminophen (TYLENOL) tablet 975 mg (975 mg Oral Given 5/4/25 1351)       ED Risk Strat Scores                    No data recorded        SBIRT 20yo+      Flowsheet Row Most Recent Value   Initial Alcohol Screen: US AUDIT-C     1. How often do you have a drink containing alcohol? 0 Filed at: 05/04/2025 1215   2. How many drinks containing alcohol do you have on a typical day you are drinking?  0 Filed at: 05/04/2025 1215   3a. Male UNDER 65: How often do you have five or more drinks on one occasion? 0 Filed at: 05/04/2025 1215   3b. FEMALE Any Age, or MALE 65+: How often do you have 4 or more drinks on one occassion? 0 Filed at: 05/04/2025 1215   Audit-C Score 0 Filed at: 05/04/2025 1215   CANDY: How many times in the past year have you...    Used an illegal drug or used a prescription medication for non-medical reasons? Never Filed at: 05/04/2025 1215                            History of Present Illness       Chief Complaint   Patient presents with    Foot Swelling     Pt arrives from home with c/o bilateral feet swelling. Pt reports she missed a step on Friday and hurt her left foot. Right foot started swelling yesterday. Pt had first chemo dose on Friday and was told to come to ED by oncology.        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    iris nevus tumor removed    Nonobstructive atherosclerosis of coronary artery     Pneumonia 11/2024    ED 2/10/2025    PONV (postoperative nausea and vomiting)       Past Surgical History:   Procedure Laterality Date    BREAST EXCISIONAL BIOPSY Left 1975    age 19; benign lesion    BREAST EXCISIONAL BIOPSY Left 01/04/2000    atypical hyperplasia    BREAST EXCISIONAL BIOPSY Left 01/30/2002    atypical intraductal hyperplasia    BREAST EXCISIONAL BIOPSY Left 04/17/2002    intraductal epithelial hyperplasia and papillomatosis with focal atypia    BREAST EXCISIONAL BIOPSY Left 02/25/2003    foci of atypical intraductal hyperplasia    BREAST EXCISIONAL BIOPSY Left 06/05/2006    fibrocystic change/fibrous mastopathy    BREAST EXCISIONAL BIOPSY Left 02/25/2008    fibrocystic changes    CARDIAC CATHETERIZATION N/A 02/11/2025    Procedure: Left Heart Catherization;  Surgeon: Yocasta Elias DO;  Location: [facility];  Service: Cardiology    CARDIAC CATHETERIZATION N/A 02/11/2025    Procedure: Cardiac Coronary Angiogram;  Surgeon: Yocasta Elias DO;  Location: [facility];  Service: Cardiology    COLONOSCOPY      DILATION AND CURETTAGE OF UTERUS      EYE SURGERY Left 12/21/2006    HYSTERECTOMY  02/2000    LYMPH NODE BIOPSY Right 4/9/2025    Procedure: RIGHT LYMPH NODE  BIOPSY WITH FROZEN SECTION ANALYSIS;  Surgeon: Antonio Carter MD;  Location:  MAIN OR;  Service: ENT    OOPHORECTOMY Bilateral 2013    approximately    REFRACTIVE SURGERY      US GUIDANCE BREAST BIOPSY LEFT EACH ADDITIONAL Left 04/28/2008    benign breast tissue with stromal fibrosis and sclerosing adenosis    US GUIDED BREAST BIOPSY LEFT COMPLETE Left 04/28/2008    benign breast tissue with stromal fibrosis and sclerosing adenosis      Family History   Problem Relation Age of Onset    Alzheimer's disease Mother      No Known Problems Father         never recovered after a surgery for his stomach    Breast cancer Sister 42    No Known Problems Sister     Lung cancer Sister          from stomach cancer    No Known Problems Maternal Aunt     Heart defect Maternal Aunt     Breast cancer Paternal Aunt         82    Breast cancer Maternal Grandmother 38    No Known Problems Maternal Grandfather     No Known Problems Paternal Grandmother     No Known Problems Paternal Grandfather     No Known Problems Daughter     Breast cancer Cousin 44    Breast cancer Cousin 45    Breast cancer additional onset Neg Hx     BRCA2 Positive Neg Hx     BRCA2 Negative Neg Hx     BRCA1 Positive Neg Hx     BRCA1 Negative Neg Hx     BRCA 1/2 Neg Hx     Colon cancer Neg Hx     Ovarian cancer Neg Hx     Endometrial cancer Neg Hx       Social History     Tobacco Use    Smoking status: Some Days     Current packs/day: 0.00     Average packs/day: 0.3 packs/day for 52.1 years (13.0 ttl pk-yrs)     Types: Cigarettes     Start date:      Last attempt to quit: 2/10/2025     Years since quittin.2    Smokeless tobacco: Never   Vaping Use    Vaping status: Former    Substances: Nicotine   Substance Use Topics    Alcohol use: Not Currently     Alcohol/week: 3.0 standard drinks of alcohol     Types: 3 Glasses of wine per week     Comment: socially    Drug use: Never      E-Cigarette/Vaping    E-Cigarette Use Former User       E-Cigarette/Vaping Substances    Nicotine Yes       I have reviewed and agree with the history as documented.     The patient is a 69-year-old female with a current history of lung cancer receiving treatment who presents with a chief complaint of lower extremity swelling, left foot injury.  The patient states that her first chemo treatment was 3 days ago.  States it was done through a peripheral IV.  Patient states that she has had worsening swelling over the bilateral lower extremities.  She states she has never had swelling to  this extent.  She states that her breathing seems to be baseline for her.  She states that 3 days ago she did miss the last step and overturned her left foot which is painful and swollen.  She denies any fevers chills.  Is nauseous but this is baseline for her.          Review of Systems   All other systems reviewed and are negative.          Objective       ED Triage Vitals [05/04/25 1215]   Temperature Pulse Blood Pressure Respirations SpO2 Patient Position - Orthostatic VS   97.8 °F (36.6 °C) 74 129/71 20 96 % Sitting      Temp Source Heart Rate Source BP Location FiO2 (%) Pain Score    Temporal Monitor Right arm -- 8      Vitals      Date and Time Temp Pulse SpO2 Resp BP Pain Score FACES Pain Rating User   05/04/25 1400 -- 66 94 % 21 140/60 -- -- NB   05/04/25 1351 -- -- -- -- -- 8 -- NB   05/04/25 1230 -- 74 97 % 20 143/62 -- -- NB   05/04/25 1215 97.8 °F (36.6 °C) 74 96 % 20 129/71 8 -- MD            Physical Exam  Vitals and nursing note reviewed.   Constitutional:       General: She is not in acute distress.     Appearance: She is well-developed.   HENT:      Head: Normocephalic and atraumatic.      Right Ear: External ear normal.      Left Ear: External ear normal.   Eyes:      Extraocular Movements: Extraocular movements intact.      Pupils: Pupils are equal, round, and reactive to light.   Cardiovascular:      Rate and Rhythm: Normal rate and regular rhythm.      Heart sounds: No murmur heard.  Pulmonary:      Effort: Pulmonary effort is normal. No respiratory distress.      Breath sounds: Rhonchi and rales present. No wheezing.   Abdominal:      General: Bowel sounds are normal.      Palpations: Abdomen is soft. There is no mass.      Tenderness: There is no abdominal tenderness. There is no rebound.      Hernia: No hernia is present.   Musculoskeletal:      Cervical back: Normal range of motion and neck supple.      Right lower leg: Edema present.      Left lower leg: Edema present.      Left foot:  Swelling and tenderness present.        Feet:    Skin:     General: Skin is warm and dry.      Capillary Refill: Capillary refill takes less than 2 seconds.   Neurological:      General: No focal deficit present.      Mental Status: She is alert and oriented to person, place, and time.      Motor: No weakness.      Coordination: Coordination normal.   Psychiatric:         Behavior: Behavior normal.         Results Reviewed       Procedure Component Value Units Date/Time    HS Troponin 0hr (reflex protocol) [[id number]]  (Normal) Collected: 05/04/25 1228    Lab Status: Final result Specimen: Blood from Arm, Right Updated: 05/04/25 1300     hs TnI 0hr 4 ng/L     B-Type Natriuretic Peptide(BNP) [[id number]]  (Normal) Collected: 05/04/25 1228    Lab Status: Final result Specimen: Blood from Arm, Right Updated: 05/04/25 1300     BNP 66 pg/mL     Comprehensive metabolic panel [[id number]]  (Abnormal) Collected: 05/04/25 1228    Lab Status: Final result Specimen: Blood from Arm, Right Updated: 05/04/25 1253     Sodium 135 mmol/L      Potassium 3.9 mmol/L      Chloride 99 mmol/L      CO2 30 mmol/L      ANION GAP 6 mmol/L      BUN 9 mg/dL      Creatinine 0.43 mg/dL      Glucose 87 mg/dL      Calcium 8.6 mg/dL      AST 61 U/L      ALT 51 U/L      Alkaline Phosphatase 55 U/L      Total Protein 6.0 g/dL      Albumin 4.0 g/dL      Total Bilirubin 0.58 mg/dL      eGFR 104 ml/min/1.73sq m     Narrative:      National Kid[employer] Foundation guidelines for Chronic Kidney Disease (CKD):     Stage 1 with normal or high GFR (GFR > 90 mL/min/1.73 square meters)    Stage 2 Mild CKD (GFR = 60-89 mL/min/1.73 square meters)    Stage 3A Moderate CKD (GFR = 45-59 mL/min/1.73 square meters)    Stage 3B Moderate CKD (GFR = 30-44 mL/min/1.73 square meters)    Stage 4 Severe CKD (GFR = 15-29 mL/min/1.73 square meters)    Stage 5 End Stage CKD (GFR <15 mL/min/1.73 square meters)  Note: GFR calculation is accurate only with a steady state  creatinine    Magnesium [[id number]]  (Normal) Collected: 05/04/25 1228    Lab Status: Final result Specimen: Blood from Arm, Right Updated: 05/04/25 1253     Magnesium 1.9 mg/dL     CBC and differential [[id number]]  (Abnormal) Collected: 05/04/25 1228    Lab Status: Final result Specimen: Blood from Arm, Right Updated: 05/04/25 1237     WBC 8.99 Thousand/uL      RBC 4.35 Million/uL      Hemoglobin 14.3 g/dL      Hematocrit 43.0 %      MCV 99 fL      MCH 32.9 pg      MCHC 33.3 g/dL      RDW 13.8 %      MPV 9.0 fL      Platelets 298 Thousands/uL      nRBC 0 /100 WBCs      Segmented % 84 %      Immature Grans % 1 %      Lymphocytes % 7 %      Monocytes % 4 %      Eosinophils Relative 4 %      Basophils Relative 0 %      Absolute Neutrophils 7.59 Thousands/µL      Absolute Immature Grans 0.06 Thousand/uL      Absolute Lymphocytes 0.63 Thousands/µL      Absolute Monocytes 0.33 Thousand/µL      Eosinophils Absolute 0.35 Thousand/µL      Basophils Absolute 0.03 Thousands/µL             CTA chest pe study   Final Interpretation by Karen Blount MD (05/04 1446)      No evidence for acute pulmonary embolus.      Increasing masslike consolidation in the left parahilar region, encasing and narrowing the left upper lobe bronchi. Additional subsegmental patchy infiltrates noted in the lingula and left lower lobe. Encasement and narrowing of the central left lower    lobe bronchi. This is progressive compared to the prior.      Persistent reticular and consolidative infiltrate in the posterior segment of the right upper lobe, only minimally improved compared to February.      Bilateral hilar and mediastinal adenopathy, without significant change.                  Workstation performed: [id number]         XR foot 3+ views LEFT   Final Interpretation by Fredi Jones MD (05/04 1328)      No acute osseous abnormality.         Computerized Assisted Algorithm (CAA) may have been used to analyze all applicable  images.         Resident: Héctor Wall I, the attending radiologist, have reviewed the images and agree with the final report above.      Workstation performed: [id number]         XR ankle 3+ views LEFT   Final Interpretation by Fredi Jones MD (05/04 1327)      Soft tissue swelling over the lateral malleolus without an acute osseous abnormality.         Computerized Assisted Algorithm (CAA) may have been used to analyze all applicable images.               Resident: Héctor Wall I, the attending radiologist, have reviewed the images and agree with the final report above.      Workstation performed: [id number]          VAS VENOUS DUPLEX - LOWER LIMB BILATERAL    (Results Pending)       ECG 12 Lead Documentation Only    Date/Time: 5/4/2025 3:24 PM    Performed by: Rajendra Juarez PA-C  Authorized by: Rajendra Juarez PA-C    Indications / Diagnosis:  Edema  ECG reviewed by me, the ED Provider: yes    Patient location:  ED  Previous ECG:     Previous ECG:  Unavailable  Interpretation:     Interpretation: non-specific    Rate:     ECG rate:  73    ECG rate assessment: normal    Rhythm:     Rhythm: sinus rhythm        ED Medication and Procedure Management   Prior to Admission Medications   Prescriptions Last Dose Informant Patient Reported? Taking?   EPINEPHrine (EPIPEN) 0.3 mg/0.3 mL SOAJ   Yes No   Sig: Inject as directed   acetaminophen (TYLENOL) 500 mg tablet   Yes No   Sig: Take 1 tablet by mouth every 6 (six) hours as needed   albuterol (Proventil HFA) 90 mcg/act inhaler   No No   Sig: Inhale 2 puffs every 6 (six) hours as needed for wheezing   aspirin (ECOTRIN LOW STRENGTH) 81 mg EC tablet   Yes No   Sig: Take 81 mg by mouth daily   benzonatate (TESSALON) 200 MG capsule   No No   Sig: Take 1 capsule (200 mg total) by mouth 3 (three) times a day as needed for cough   brimonidine (ALPHAGAN P) 0.15 % ophthalmic solution   Yes No   Sig: Apply to eye   brimonidine-timolol  (COMBIGAN) 0.2-0.5 %   Yes No   Sig: Administer to both eyes every 12 (twelve) hours   carvedilol (COREG) 6.25 mg tablet   Yes No   Sig: Take 6.25 mg by mouth 2 (two) times a day   fluticasone-umeclidinium-vilanterol (Trelegy Ellipta) 200-62.5-25 mcg/actuation AEPB inhaler   No No   Sig: Inhale 1 puff daily Rinse mouth after use.   folic acid (FOLVITE) 1 mg tablet   No No   Sig: Take 1 tablet (1,000 mcg total) by mouth daily   ipratropium-albuterol (DUO-NEB) 0.5-2.5 mg/3 mL nebulizer solution   No No   Sig: Take 3 mL by nebulization 4 (four) times a day   loratadine (CLARITIN) 10 mg tablet   Yes No   Sig: Take 10 mg by mouth daily   losartan (COZAAR) 25 mg tablet   No No   Sig: Take 1 tablet (25 mg total) by mouth daily   nicotine (NICODERM CQ) 14 mg/24hr TD 24 hr patch   No No   Sig: Place 1 patch on the skin over 24 hours daily as needed (nicotine replacement)   Patient not taking: Reported on 4/14/2025   ondansetron (ZOFRAN) 4 mg tablet   Yes No   Sig: Take 1 tablet by mouth every 8 (eight) hours as needed   ondansetron (ZOFRAN) 8 mg tablet   No No   Sig: Take 1 tablet (8 mg total) by mouth every 8 (eight) hours as needed for nausea   prednisoLONE acetate (PRED MILD) 0.12 % ophthalmic suspension   Yes No   Sig: Administer 1 drop into the left eye 4 (four) times a day   rosuvastatin (CRESTOR) 40 MG tablet   No No   Sig: Take 1 tablet (40 mg total) by mouth daily      Facility-Administered Medications: None     Discharge Medication List as of 5/4/2025  2:49 PM        START taking these medications    Details   oxyCODONE (Roxicodone) 5 immediate release tablet Take 1 tablet (5 mg total) by mouth every 6 (six) hours as needed for severe pain Max Daily Amount: 20 mg, Starting Sun 5/4/2025, Normal           CONTINUE these medications which have NOT CHANGED    Details   acetaminophen (TYLENOL) 500 mg tablet Take 1 tablet by mouth every 6 (six) hours as needed, Historical Med      albuterol (Proventil HFA) 90 mcg/act  inhaler Inhale 2 puffs every 6 (six) hours as needed for wheezing, Starting Tue 1/14/2025, Normal      aspirin (ECOTRIN LOW STRENGTH) 81 mg EC tablet Take 81 mg by mouth daily, Historical Med      benzonatate (TESSALON) 200 MG capsule Take 1 capsule (200 mg total) by mouth 3 (three) times a day as needed for cough, Starting Wed 4/16/2025, Normal      brimonidine (ALPHAGAN P) 0.15 % ophthalmic solution Apply to eye, Historical Med      brimonidine-timolol (COMBIGAN) 0.2-0.5 % Administer to both eyes every 12 (twelve) hours, Historical Med      carvedilol (COREG) 6.25 mg tablet Take 6.25 mg by mouth 2 (two) times a day, Starting Thu 10/3/2024, Historical Med      EPINEPHrine (EPIPEN) 0.3 mg/0.3 mL SOAJ Inject as directed, Starting Mon 4/25/2011, Historical Med      fluticasone-umeclidinium-vilanterol (Trelegy Ellipta) 200-62.5-25 mcg/actuation AEPB inhaler Inhale 1 puff daily Rinse mouth after use., Starting Wed 4/23/2025, Until Fri 5/23/2025, Normal      folic acid (FOLVITE) 1 mg tablet Take 1 tablet (1,000 mcg total) by mouth daily, Starting Fri 4/25/2025, Normal      ipratropium-albuterol (DUO-NEB) 0.5-2.5 mg/3 mL nebulizer solution Take 3 mL by nebulization 4 (four) times a day, Starting Thu 4/3/2025, Normal      loratadine (CLARITIN) 10 mg tablet Take 10 mg by mouth daily, Starting Fri 2/28/2025, Historical Med      losartan (COZAAR) 25 mg tablet Take 1 tablet (25 mg total) by mouth daily, Starting Fri 2/14/2025, Normal      nicotine (NICODERM CQ) 14 mg/24hr TD 24 hr patch Place 1 patch on the skin over 24 hours daily as needed (nicotine replacement), Starting Thu 2/13/2025, Normal      !! ondansetron (ZOFRAN) 4 mg tablet Take 1 tablet by mouth every 8 (eight) hours as needed, Starting Tue 4/15/2025, Historical Med      !! ondansetron (ZOFRAN) 8 mg tablet Take 1 tablet (8 mg total) by mouth every 8 (eight) hours as needed for nausea, Starting Fri 4/25/2025, Normal      prednisoLONE acetate (PRED MILD) 0.12 %  ophthalmic suspension Administer 1 drop into the left eye 4 (four) times a day, Historical Med      rosuvastatin (CRESTOR) 40 MG tablet Take 1 tablet (40 mg total) by mouth daily, Starting Mon 4/14/2025, Normal       !! - Potential duplicate medications found. Please discuss with provider.          ED SEPSIS DOCUMENTATION   Time reflects when diagnosis was documented in both MDM as applicable and the Disposition within this note       Time User Action Codes Description Comment    5/4/2025  2:41 PM Rajendra Juarez [S93.602A] Foot sprain, left, initial encounter     5/4/2025  2:41 PM Rajendra Juarez [R60.0] Lower leg edema                  Rajendra Juarez PA-C  05/04/25 1526

## 2025-05-04 NOTE — TELEPHONE ENCOUNTER
"Patient stated, \"I had my chemotherapy on Friday, now my both extremities are swollen. I also fell on Friday and hurt my left foot, which is not getting better.\"     On call provider notified via secure chat   "

## 2025-05-05 LAB
ATRIAL RATE: 73 BPM
P AXIS: 29 DEGREES
PR INTERVAL: 150 MS
QRS AXIS: 86 DEGREES
QRSD INTERVAL: 74 MS
QT INTERVAL: 402 MS
QTC INTERVAL: 442 MS
T WAVE AXIS: 54 DEGREES
VENTRICULAR RATE: 73 BPM

## 2025-05-05 PROCEDURE — 93970 EXTREMITY STUDY: CPT | Performed by: SURGERY

## 2025-05-05 PROCEDURE — 93010 ELECTROCARDIOGRAM REPORT: CPT | Performed by: INTERNAL MEDICINE

## 2025-05-09 ENCOUNTER — HOSPITAL ENCOUNTER (OUTPATIENT)
Dept: INFUSION CENTER | Facility: HOSPITAL | Age: 70
Discharge: HOME | End: 2025-05-09
Attending: INTERNAL MEDICINE

## 2025-05-14 ENCOUNTER — TELEPHONE (OUTPATIENT)
Age: 70
End: 2025-05-14

## 2025-05-14 ENCOUNTER — HOSPITAL ENCOUNTER (OUTPATIENT)
Dept: NON INVASIVE DIAGNOSTICS | Facility: HOSPITAL | Age: 70
Discharge: HOME | End: 2025-05-14
Attending: NURSE PRACTITIONER
Payer: MEDICARE

## 2025-05-14 VITALS
DIASTOLIC BLOOD PRESSURE: 60 MMHG | HEART RATE: 80 BPM | HEIGHT: 67 IN | SYSTOLIC BLOOD PRESSURE: 140 MMHG | WEIGHT: 100 LBS | BODY MASS INDEX: 15.7 KG/M2

## 2025-05-14 DIAGNOSIS — C34.12 PRIMARY ADENOCARCINOMA OF UPPER LOBE OF LEFT LUNG (HCC): Chronic | ICD-10-CM

## 2025-05-14 DIAGNOSIS — I25.10 CORONARY ARTERY DISEASE INVOLVING NATIVE CORONARY ARTERY OF NATIVE HEART WITHOUT ANGINA PECTORIS: ICD-10-CM

## 2025-05-14 DIAGNOSIS — I10 ESSENTIAL HYPERTENSION: ICD-10-CM

## 2025-05-14 LAB
AORTIC ROOT: 2.6 CM
BSA FOR ECHO PROCEDURE: 1.51 M2
FRACTIONAL SHORTENING: 34 (ref 28–44)
INTERVENTRICULAR SEPTUM IN DIASTOLE (PARASTERNAL SHORT AXIS VIEW): 0.9 CM
INTERVENTRICULAR SEPTUM: 0.9 CM (ref 0.6–1.1)
LAAS-AP2: 13 CM2
LAAS-AP4: 10 CM2
LEFT ATRIUM SIZE: 3 CM
LEFT ATRIUM VOLUME (MOD BIPLANE): 29 ML
LEFT ATRIUM VOLUME INDEX (MOD BIPLANE): 19.2 ML/M2
LEFT INTERNAL DIMENSION IN SYSTOLE: 2.5 CM (ref 2.1–4)
LEFT VENTRICULAR INTERNAL DIMENSION IN DIASTOLE: 3.8 CM (ref 3.5–6)
LEFT VENTRICULAR POSTERIOR WALL IN END DIASTOLE: 0.9 CM
LEFT VENTRICULAR STROKE VOLUME: 39 ML
LV EF US.2D.A4C+ESTIMATED: 63 %
LVSV (TEICH): 39 ML
RIGHT ATRIUM AREA SYSTOLE A4C: 10.3 CM2
RIGHT VENTRICLE ID DIMENSION: 3.2 CM
SL CV LEFT ATRIUM LENGTH A2C: 4.3 CM
SL CV LV EF: 60
SL CV PED ECHO LEFT VENTRICLE DIASTOLIC VOLUME (MOD BIPLANE) 2D: 62 ML
SL CV PED ECHO LEFT VENTRICLE SYSTOLIC VOLUME (MOD BIPLANE) 2D: 22 ML

## 2025-05-14 PROCEDURE — 93308 TTE F-UP OR LMTD: CPT

## 2025-05-14 NOTE — TELEPHONE ENCOUNTER
PROVIDER: Dr Hanna    Pt calling stated she called the [facility] lab to schedule her appt for labs next week and was told there were no orders.    Discussed with the pt there are labs for her entered with Exp date 5/23.  That is the same day as her Tx she said.    Will change lab date for 5/20,pt informed and will call back to lab to schedule.    Pt stated she will also need other order dates 6/13 and 7/4 changed as well Pt has Tx on 6/13 and 7/3  Pls advise

## 2025-05-15 ENCOUNTER — RESULTS FOLLOW-UP (OUTPATIENT)
Dept: CARDIOLOGY CLINIC | Facility: CLINIC | Age: 70
End: 2025-05-15

## 2025-05-15 ENCOUNTER — HOSPITAL ENCOUNTER (OUTPATIENT)
Dept: PULMONOLOGY | Facility: HOSPITAL | Age: 70
End: 2025-05-15
Payer: MEDICARE

## 2025-05-15 DIAGNOSIS — R59.0 MEDIASTINAL LYMPHADENOPATHY: ICD-10-CM

## 2025-05-15 DIAGNOSIS — R91.8 ABNORMAL CT SCAN OF LUNG: ICD-10-CM

## 2025-05-15 PROCEDURE — 94760 N-INVAS EAR/PLS OXIMETRY 1: CPT

## 2025-05-15 PROCEDURE — 94060 EVALUATION OF WHEEZING: CPT

## 2025-05-15 PROCEDURE — 94726 PLETHYSMOGRAPHY LUNG VOLUMES: CPT

## 2025-05-15 PROCEDURE — 94729 DIFFUSING CAPACITY: CPT

## 2025-05-15 PROCEDURE — 94729 DIFFUSING CAPACITY: CPT | Performed by: INTERNAL MEDICINE

## 2025-05-15 PROCEDURE — 94726 PLETHYSMOGRAPHY LUNG VOLUMES: CPT | Performed by: INTERNAL MEDICINE

## 2025-05-15 PROCEDURE — 94060 EVALUATION OF WHEEZING: CPT | Performed by: INTERNAL MEDICINE

## 2025-05-15 RX ORDER — ALBUTEROL SULFATE 0.83 MG/ML
2.5 SOLUTION RESPIRATORY (INHALATION) ONCE AS NEEDED
Status: COMPLETED | OUTPATIENT
Start: 2025-05-15 | End: 2025-05-15

## 2025-05-15 RX ADMIN — ALBUTEROL SULFATE 2.5 MG: 2.5 SOLUTION RESPIRATORY (INHALATION) at 09:26

## 2025-05-16 RX ORDER — SODIUM CHLORIDE 9 MG/ML
20 INJECTION, SOLUTION INTRAVENOUS ONCE
Status: CANCELLED | OUTPATIENT
Start: 2025-05-23

## 2025-05-19 ENCOUNTER — NURSE TRIAGE (OUTPATIENT)
Age: 70
End: 2025-05-19

## 2025-05-19 ENCOUNTER — RESULTS FOLLOW-UP (OUTPATIENT)
Age: 70
End: 2025-05-19

## 2025-05-19 DIAGNOSIS — R60.9 EDEMA, UNSPECIFIED TYPE: Primary | ICD-10-CM

## 2025-05-19 RX ORDER — FUROSEMIDE 40 MG/1
40 TABLET ORAL DAILY PRN
Qty: 30 TABLET | Refills: 0 | Status: SHIPPED | OUTPATIENT
Start: 2025-05-19

## 2025-05-19 NOTE — TELEPHONE ENCOUNTER
Patient calling to speak with Dr. Hanna.  She states the edema has gotten worse on both feet .  She can hardly put her foot down or put on a shoe.,

## 2025-05-19 NOTE — TELEPHONE ENCOUNTER
Provider: Dr. Hanna    FOLLOW UP: Return phone call to patient with further instructions/recommendations.    REASON FOR CONVERSATION: Symptom Management and Leg Swelling    SYMPTOMS: Increased B/L ankle/feet swelling.     OTHER: Patient receiving chemotherapy. She reports she had LE swelling prior to starting treatment, once after lymph node removal and then it happened again about 3 days after tx.     DISPOSITION: Callback From Office Today (overriding See Within 3 Days in Office)    Patient denies any fevers, infection, difficulty breathing, chest pain, nausea/vomiting and any other symptoms. I instructed her to go to ER if she notices new or worsening symptoms.    I also instructed her to make sure she is wearing her compression stockings and elevating her legs. Patient states she has been and is not effective. I confirmed with her we would discuss with provider and return her call. She verbalized understanding and has no additional questions or concerns at this moment.

## 2025-05-19 NOTE — TELEPHONE ENCOUNTER
Reviewed with Dr. Hanna.  Recommending lasix 40mg PO daily PRN if edema present at lunch time.  Prescription pended for signature     Returned call to patient.  Reviewed recommendations.  Agreeable for lasix daily as needed at lunch time if edema present.  Patient with infusion appt on Friday and will update office at that time

## 2025-05-19 NOTE — TELEPHONE ENCOUNTER
"Reason for Disposition  • MILD swelling of both ankles (i.e., pedal edema) AND new-onset or getting worse    Answer Assessment - Initial Assessment Questions  1. ONSET: \"When did the swelling start?\" (e.g., minutes, hours, days)      Noticed legs swelled again after chemotherapy.    2. LOCATION: \"What part of the leg is swollen?\"  \"Are both legs swollen or just one leg?\"      B/L ankles/feet    3. SEVERITY: \"How bad is the swelling?\" (e.g., localized; mild, moderate, severe)      Moderate    4. REDNESS: \"Does the swelling look red or infected?\"      Unsure    5. PAIN: \"Is the swelling painful to touch?\" If Yes, ask: \"How painful is it?\"   (Scale 1-10; mild, moderate or severe)      6/10 when up and moving, especially up and down stairs.     6. FEVER: \"Do you have a fever?\" If Yes, ask: \"What is it, how was it measured, and when did it start?\"       Denies    7. CAUSE: \"What do you think is causing the leg swelling?\"      Unsure    8. MEDICAL HISTORY: \"Do you have a history of blood clots (e.g., DVT), cancer, heart failure, kidney disease, or liver failure?\"      Adenocarcinoma    9. RECURRENT SYMPTOM: \"Have you had leg swelling before?\" If Yes, ask: \"When was the last time?\" \"What happened that time?\"      Patient had this before.    10. OTHER SYMPTOMS: \"Do you have any other symptoms?\" (e.g., chest pain, difficulty breathing)        Denies    Protocols used: Leg Swelling and Edema-Adult-[place]    "

## 2025-05-20 ENCOUNTER — APPOINTMENT (OUTPATIENT)
Dept: LAB | Facility: HOSPITAL | Age: 70
End: 2025-05-20
Payer: MEDICARE

## 2025-05-20 DIAGNOSIS — C34.12 PRIMARY ADENOCARCINOMA OF UPPER LOBE OF LEFT LUNG (HCC): ICD-10-CM

## 2025-05-20 LAB
ALBUMIN SERPL BCG-MCNC: 3.6 G/DL (ref 3.5–5)
ALP SERPL-CCNC: 59 U/L (ref 34–104)
ALT SERPL W P-5'-P-CCNC: 12 U/L (ref 7–52)
ANION GAP SERPL CALCULATED.3IONS-SCNC: 9 MMOL/L (ref 4–13)
AST SERPL W P-5'-P-CCNC: 19 U/L (ref 13–39)
BASOPHILS # BLD AUTO: 0.07 THOUSANDS/ÂΜL (ref 0–0.1)
BASOPHILS NFR BLD AUTO: 1 % (ref 0–1)
BILIRUB SERPL-MCNC: 0.38 MG/DL (ref 0.2–1)
BUN SERPL-MCNC: 8 MG/DL (ref 5–25)
CALCIUM SERPL-MCNC: 8.8 MG/DL (ref 8.4–10.2)
CHLORIDE SERPL-SCNC: 100 MMOL/L (ref 96–108)
CO2 SERPL-SCNC: 28 MMOL/L (ref 21–32)
CREAT SERPL-MCNC: 0.47 MG/DL (ref 0.6–1.3)
EOSINOPHIL # BLD AUTO: 0.15 THOUSAND/ÂΜL (ref 0–0.61)
EOSINOPHIL NFR BLD AUTO: 3 % (ref 0–6)
ERYTHROCYTE [DISTWIDTH] IN BLOOD BY AUTOMATED COUNT: 14.2 % (ref 11.6–15.1)
GFR SERPL CREATININE-BSD FRML MDRD: 101 ML/MIN/1.73SQ M
GLUCOSE P FAST SERPL-MCNC: 101 MG/DL (ref 65–99)
HCT VFR BLD AUTO: 41.5 % (ref 34.8–46.1)
HGB BLD-MCNC: 13.2 G/DL (ref 11.5–15.4)
IMM GRANULOCYTES # BLD AUTO: 0.03 THOUSAND/UL (ref 0–0.2)
IMM GRANULOCYTES NFR BLD AUTO: 1 % (ref 0–2)
LYMPHOCYTES # BLD AUTO: 0.47 THOUSANDS/ÂΜL (ref 0.6–4.47)
LYMPHOCYTES NFR BLD AUTO: 8 % (ref 14–44)
MCH RBC QN AUTO: 31.4 PG (ref 26.8–34.3)
MCHC RBC AUTO-ENTMCNC: 31.8 G/DL (ref 31.4–37.4)
MCV RBC AUTO: 99 FL (ref 82–98)
MONOCYTES # BLD AUTO: 0.7 THOUSAND/ÂΜL (ref 0.17–1.22)
MONOCYTES NFR BLD AUTO: 13 % (ref 4–12)
NEUTROPHILS # BLD AUTO: 4.15 THOUSANDS/ÂΜL (ref 1.85–7.62)
NEUTS SEG NFR BLD AUTO: 75 % (ref 43–75)
PLATELET # BLD AUTO: 442 THOUSANDS/UL (ref 149–390)
PMV BLD AUTO: 8.8 FL (ref 8.9–12.7)
POTASSIUM SERPL-SCNC: 3.9 MMOL/L (ref 3.5–5.3)
PROT SERPL-MCNC: 6 G/DL (ref 6.4–8.4)
RBC # BLD AUTO: 4.2 MILLION/UL (ref 3.81–5.12)
SODIUM SERPL-SCNC: 137 MMOL/L (ref 135–147)
T3FREE SERPL-MCNC: 3.01 PG/ML (ref 2.5–3.9)
T4 FREE SERPL-MCNC: 0.75 NG/DL (ref 0.61–1.12)
TSH SERPL DL<=0.05 MIU/L-ACNC: 4.99 UIU/ML (ref 0.45–4.5)
WBC # BLD AUTO: 5.57 THOUSAND/UL (ref 4.31–10.16)

## 2025-05-20 PROCEDURE — 36415 COLL VENOUS BLD VENIPUNCTURE: CPT

## 2025-05-20 PROCEDURE — 85025 COMPLETE CBC W/AUTO DIFF WBC: CPT

## 2025-05-20 PROCEDURE — 80053 COMPREHEN METABOLIC PANEL: CPT

## 2025-05-20 PROCEDURE — 84439 ASSAY OF FREE THYROXINE: CPT

## 2025-05-20 PROCEDURE — 84443 ASSAY THYROID STIM HORMONE: CPT

## 2025-05-20 PROCEDURE — 84481 FREE ASSAY (FT-3): CPT

## 2025-05-22 ENCOUNTER — OFFICE VISIT (OUTPATIENT)
Dept: PULMONOLOGY | Facility: CLINIC | Age: 70
End: 2025-05-22

## 2025-05-22 VITALS
TEMPERATURE: 97 F | BODY MASS INDEX: 15.7 KG/M2 | HEIGHT: 67 IN | HEART RATE: 103 BPM | DIASTOLIC BLOOD PRESSURE: 66 MMHG | SYSTOLIC BLOOD PRESSURE: 114 MMHG | WEIGHT: 100 LBS | OXYGEN SATURATION: 100 %

## 2025-05-22 DIAGNOSIS — Z72.0 TOBACCO USE: ICD-10-CM

## 2025-05-22 DIAGNOSIS — J44.9 STAGE 2 MODERATE COPD BY GOLD CLASSIFICATION (HCC): ICD-10-CM

## 2025-05-22 DIAGNOSIS — C34.12 PRIMARY ADENOCARCINOMA OF UPPER LOBE OF LEFT LUNG (HCC): Primary | Chronic | ICD-10-CM

## 2025-05-22 RX ORDER — FLUTICASONE FUROATE, UMECLIDINIUM BROMIDE AND VILANTEROL TRIFENATATE 200; 62.5; 25 UG/1; UG/1; UG/1
1 POWDER RESPIRATORY (INHALATION) DAILY
Qty: 60 BLISTER | Refills: 0 | Status: SHIPPED | COMMUNITY
Start: 2025-05-22 | End: 2025-06-21

## 2025-05-22 NOTE — ASSESSMENT & PLAN NOTE
Stage BLACK left upper lobe adenocarcinoma  I have reviewed oncology's recent note, favoring pemetrexed/carbo/pembro infusions

## 2025-05-22 NOTE — PROGRESS NOTES
Follow-up  Visit - Pulmonary Medicine   Name: Corie Pal      : 1955      MRN: [id number]  Encounter Provider: KRISTI Murillo  Encounter Date: 2025   Encounter department: [facility] [place]  :  Assessment & Plan  Primary adenocarcinoma of upper lobe of left lung (HCC)  Stage BLACK left upper lobe adenocarcinoma  I have reviewed oncology's recent note, favoring pemetrexed/carbo/pembro infusions       Tobacco use  Still smoking 3 per day, hoping to continue cutting back until she is a nonsmoker.       Stage 2 moderate COPD by GOLD classification (HCC)  Exam today is clear and we reviewed PFT results. She is at her baseline. With regard to inhaler costs - she has not tried Trelegy because the price was initmidating.  I did give her a sample of Trelegy to try 1 puff every day.  She will let me know if this is beneficial and if she can bear the costs. May consider all nebulized regimen if needed.  DuoNeb can be taken up to 4x per day if needed.   Orders:    fluticasone-umeclidinium-vilanterol (Trelegy Ellipta) 200-62.5-25 mcg/actuation AEPB inhaler; Inhale 1 puff daily Rinse mouth after use.      Return in about 3 months (around 2025).    History of Present Illness   Corie Pal is a 69 y.o. female who presents returning for a 6 week follow up. Since last visit she has started chemo infusions. She is coping with the symptoms - nausea, reduced appetite, water retention, fatigue.    She is taking nebulizer treatments 4x per day around the clock. She has a cough which occasionally produces clear sputum. Denies wheezing. She is trying to remain active during the day with frequent walking. Not sleeping well between the breathing and leg edema, +/- chemo side effects. Has not been sick and otherwise feels stable.     Did not  Trelegy at $225; no benefit with albuterol.    Review of Systems  Please note that a 14-point review of systems was performed to  "include Constitutional, HEENT, Respiratory, CVS, GI, , Musculoskeletal, Integumentary, Neurologic, Rheumatologic, Endocrinologic, Psychiatric, Lymphatic, and Hematologic/Oncologic systems were reviewed and are negative unless otherwise stated in HPI. Positive and negative findings pertinent to this evaluation are incorporated into the history of present illness.       Medications Ordered Prior to Encounter[1]   Social History[2]     Medical History Reviewed by provider this encounter:     .    Objective   /66   Pulse 103   Temp (!) 97 °F (36.1 °C)   Ht 5' 7\" (1.702 m)   Wt 45.4 kg (100 lb)   SpO2 100%   BMI 15.66 kg/m²     Physical Exam  Vitals reviewed.   Constitutional:       Appearance: Normal appearance.   HENT:      Head: Normocephalic.      Nose: Nose normal.      Mouth/Throat:      Mouth: Mucous membranes are moist.      Pharynx: Oropharynx is clear.     Cardiovascular:      Rate and Rhythm: Normal rate and regular rhythm.      Pulses: Normal pulses.      Heart sounds: Normal heart sounds.   Pulmonary:      Effort: Pulmonary effort is normal.      Breath sounds: Normal breath sounds.     Musculoskeletal:         General: Normal range of motion.     Skin:     General: Skin is warm.      Capillary Refill: Capillary refill takes less than 2 seconds.     Neurological:      General: No focal deficit present.      Mental Status: She is alert and oriented to person, place, and time.     Psychiatric:         Mood and Affect: Mood normal.         Behavior: Behavior normal.         Diagnostic Data:  Labs: I personally reviewed the most recent laboratory data pertinent to today's visit.  Lab Results   Component Value Date    WBC 5.57 05/20/2025    HGB 13.2 05/20/2025    HCT 41.5 05/20/2025    MCV 99 (H) 05/20/2025     (H) 05/20/2025    EOSPCT 3 05/20/2025    EOSABS 0.15 05/20/2025    MONOPCT 13 (H) 05/20/2025     Lab Results   Component Value Date    CALCIUM 8.8 05/20/2025    K 3.9 05/20/2025    CO2 " "28 05/20/2025     05/20/2025    BUN 8 05/20/2025    CREATININE 0.47 (L) 05/20/2025     No results found for: \"IGE\", \"RAST\"  Lab Results   Component Value Date    ALT 12 05/20/2025    AST 19 05/20/2025    ALKPHOS 59 05/20/2025         Radiology results:  Radiology Results Review: I have reviewed radiology reports from PACS including: CT chest, procedure reports, and Echocardiogram.  CTA chest PE study 5/4/25  No evidence for acute pulmonary embolus.     Increasing masslike consolidation in the left parahilar region, encasing and narrowing the left upper lobe bronchi. Additional subsegmental patchy infiltrates noted in the lingula and left lower lobe. Encasement and narrowing of the central left lower   lobe bronchi. This is progressive compared to the prior.     Persistent reticular and consolidative infiltrate in the posterior segment of the right upper lobe, only minimally improved compared to February.     Bilateral hilar and mediastinal adenopathy, without significant change.    ECHO 5/14/25    Left Ventricle: Left ventricular cavity size is normal. Wall thickness is normal. The left ventricular ejection fraction is 60%. Systolic function is normal. Wall motion is normal. Diastolic function is normal.    Aortic Valve: There is trace regurgitation.    Tricuspid Valve: There is trace regurgitation.    Pulmonic Valve: There is mild regurgitation.    Pericardium: There is no pericardial effusion.    PFT/spirometry results:  PFT 5/15/25  Moderate obstructive airflow defect on spirometry.  No post bronchodilator response.  Air trapping as indicated by the lung volumes.  Moderate decrease corrected diffusion capacity.  Flow-volume loop consistent with obstruction        KRITSI Murillo           [1]   Current Outpatient Medications on File Prior to Visit   Medication Sig Dispense Refill    acetaminophen (TYLENOL) 500 mg tablet Take 1 tablet by mouth every 6 (six) hours as needed      albuterol (Proventil HFA) 90 " mcg/act inhaler Inhale 2 puffs every 6 (six) hours as needed for wheezing 6.7 g 5    brimonidine (ALPHAGAN P) 0.15 % ophthalmic solution Apply to eye      brimonidine-timolol (COMBIGAN) 0.2-0.5 % Administer to both eyes every 12 (twelve) hours      carvedilol (COREG) 6.25 mg tablet Take 6.25 mg by mouth in the morning and 6.25 mg in the evening.      folic acid (FOLVITE) 1 mg tablet Take 1 tablet (1,000 mcg total) by mouth daily 30 tablet 5    furosemide (LASIX) 40 mg tablet Take 1 tablet (40 mg total) by mouth daily as needed (lower leg edema) 30 tablet 0    ipratropium-albuterol (DUO-NEB) 0.5-2.5 mg/3 mL nebulizer solution Take 3 mL by nebulization 4 (four) times a day 360 mL 3    loratadine (CLARITIN) 10 mg tablet Take 10 mg by mouth in the morning.      losartan (COZAAR) 25 mg tablet Take 1 tablet (25 mg total) by mouth daily 30 tablet 0    ondansetron (ZOFRAN) 8 mg tablet Take 1 tablet (8 mg total) by mouth every 8 (eight) hours as needed for nausea 30 tablet 3    prednisoLONE acetate (PRED MILD) 0.12 % ophthalmic suspension Administer 1 drop into the left eye in the morning and 1 drop at noon and 1 drop in the evening and 1 drop before bedtime.      rosuvastatin (CRESTOR) 40 MG tablet Take 1 tablet (40 mg total) by mouth daily 30 tablet 11    [DISCONTINUED] aspirin (ECOTRIN LOW STRENGTH) 81 mg EC tablet Take 81 mg by mouth in the morning.      EPINEPHrine (EPIPEN) 0.3 mg/0.3 mL SOAJ Inject as directed (Patient not taking: Reported on 5/22/2025)      [DISCONTINUED] benzonatate (TESSALON) 200 MG capsule Take 1 capsule (200 mg total) by mouth 3 (three) times a day as needed for cough 20 capsule 0    [DISCONTINUED] fluticasone-umeclidinium-vilanterol (Trelegy Ellipta) 200-62.5-25 mcg/actuation AEPB inhaler Inhale 1 puff daily Rinse mouth after use. 60 blister 0    [DISCONTINUED] nicotine (NICODERM CQ) 14 mg/24hr TD 24 hr patch Place 1 patch on the skin over 24 hours daily as needed (nicotine replacement) (Patient  not taking: Reported on 2025) 30 patch 0    [DISCONTINUED] ondansetron (ZOFRAN) 4 mg tablet Take 1 tablet by mouth every 8 (eight) hours as needed      [DISCONTINUED] oxyCODONE (Roxicodone) 5 immediate release tablet Take 1 tablet (5 mg total) by mouth every 6 (six) hours as needed for severe pain Max Daily Amount: 20 mg 12 tablet 0     No current facility-administered medications on file prior to visit.   [2]   Social History  Tobacco Use    Smoking status: Some Days     Current packs/day: 0.00     Average packs/day: 0.3 packs/day for 52.1 years (13.0 ttl pk-yrs)     Types: Cigarettes     Start date:      Last attempt to quit: 2/10/2025     Years since quittin.2    Smokeless tobacco: Never   Vaping Use    Vaping status: Former    Substances: Nicotine   Substance and Sexual Activity    Alcohol use: Not Currently     Alcohol/week: 3.0 standard drinks of alcohol     Types: 3 Glasses of wine per week     Comment: socially    Drug use: Never    Sexual activity: Yes

## 2025-05-22 NOTE — ASSESSMENT & PLAN NOTE
Exam today is clear and we reviewed PFT results. She is at her baseline. With regard to inhaler costs - she has not tried Trelegy because the price was initmidating.  I did give her a sample of Trelegy to try 1 puff every day.  She will let me know if this is beneficial and if she can bear the costs. May consider all nebulized regimen if needed.  DuoNeb can be taken up to 4x per day if needed.   Orders:    fluticasone-umeclidinium-vilanterol (Trelegy Ellipta) 200-62.5-25 mcg/actuation AEPB inhaler; Inhale 1 puff daily Rinse mouth after use.

## 2025-05-23 ENCOUNTER — HOSPITAL ENCOUNTER (OUTPATIENT)
Dept: INFUSION CENTER | Facility: HOSPITAL | Age: 70
End: 2025-05-23
Attending: INTERNAL MEDICINE
Payer: MEDICARE

## 2025-05-23 VITALS
DIASTOLIC BLOOD PRESSURE: 79 MMHG | OXYGEN SATURATION: 91 % | TEMPERATURE: 96.3 F | WEIGHT: 99.87 LBS | HEIGHT: 67 IN | HEART RATE: 85 BPM | BODY MASS INDEX: 15.67 KG/M2 | RESPIRATION RATE: 18 BRPM | SYSTOLIC BLOOD PRESSURE: 144 MMHG

## 2025-05-23 DIAGNOSIS — C34.12 PRIMARY ADENOCARCINOMA OF UPPER LOBE OF LEFT LUNG (HCC): Primary | Chronic | ICD-10-CM

## 2025-05-23 PROCEDURE — 96411 CHEMO IV PUSH ADDL DRUG: CPT

## 2025-05-23 PROCEDURE — 96367 TX/PROPH/DG ADDL SEQ IV INF: CPT

## 2025-05-23 PROCEDURE — 96413 CHEMO IV INFUSION 1 HR: CPT

## 2025-05-23 PROCEDURE — 96417 CHEMO IV INFUS EACH ADDL SEQ: CPT

## 2025-05-23 RX ORDER — SODIUM CHLORIDE 9 MG/ML
20 INJECTION, SOLUTION INTRAVENOUS ONCE
Status: COMPLETED | OUTPATIENT
Start: 2025-05-23 | End: 2025-05-23

## 2025-05-23 RX ADMIN — PEMETREXED DISODIUM 755 MG: 500 INJECTION, POWDER, LYOPHILIZED, FOR SOLUTION INTRAVENOUS at 13:27

## 2025-05-23 RX ADMIN — DEXAMETHASONE SODIUM PHOSPHATE: 100 INJECTION INTRAMUSCULAR; INTRAVENOUS at 11:18

## 2025-05-23 RX ADMIN — CARBOPLATIN 400.5 MG: 10 INJECTION, SOLUTION INTRAVENOUS at 13:43

## 2025-05-23 RX ADMIN — SODIUM CHLORIDE 200 MG: 9 INJECTION, SOLUTION INTRAVENOUS at 12:40

## 2025-05-23 RX ADMIN — FOSAPREPITANT 150 MG: 150 INJECTION, POWDER, LYOPHILIZED, FOR SOLUTION INTRAVENOUS at 11:45

## 2025-05-23 RX ADMIN — SODIUM CHLORIDE 20 ML/HR: 0.9 INJECTION, SOLUTION INTRAVENOUS at 11:20

## 2025-05-23 NOTE — PROGRESS NOTES
Corie Pal  tolerated keytruda/alimta/carbo treatment well with no complications.      Corie Pal is aware of future appt on 6/13/25 at 1130.     AVS printed and given to Corie Pal:  Yes

## 2025-05-23 NOTE — PLAN OF CARE
Problem: Potential for Falls  Goal: Patient will remain free of falls  Description: INTERVENTIONS:  - Educate patient/family on patient safety including physical limitations  - Instruct patient to call for assistance with activity   - Consider consulting OT/PT to assist with strengthening/mobility based on AM PAC & JH-HLM score  - Consult OT/PT to assist with strengthening/mobility   - Keep Call bell within reach  - Keep bed low and locked with side rails adjusted as appropriate  - Keep care items and personal belongings within reach  - Initiate and maintain comfort rounds  - Make Fall Risk Sign visible to staff    Outcome: Progressing     Problem: Knowledge Deficit  Goal: Patient/family/caregiver demonstrates understanding of disease process, treatment plan, medications, and discharge instructions  Description: Complete learning assessment and assess knowledge base.  Interventions:  - Provide teaching at level of understanding  - Provide teaching via preferred learning methods  Outcome: Progressing

## 2025-05-27 ENCOUNTER — OFFICE VISIT (OUTPATIENT)
Dept: HEMATOLOGY ONCOLOGY | Facility: CLINIC | Age: 70
End: 2025-05-27
Payer: MEDICARE

## 2025-05-27 VITALS
HEIGHT: 67 IN | OXYGEN SATURATION: 96 % | DIASTOLIC BLOOD PRESSURE: 73 MMHG | HEART RATE: 76 BPM | SYSTOLIC BLOOD PRESSURE: 113 MMHG | WEIGHT: 101 LBS | BODY MASS INDEX: 15.85 KG/M2 | TEMPERATURE: 97.6 F

## 2025-05-27 DIAGNOSIS — R19.7 DIARRHEA, UNSPECIFIED TYPE: ICD-10-CM

## 2025-05-27 DIAGNOSIS — C34.12 PRIMARY ADENOCARCINOMA OF UPPER LOBE OF LEFT LUNG (HCC): Primary | Chronic | ICD-10-CM

## 2025-05-27 PROCEDURE — 99215 OFFICE O/P EST HI 40 MIN: CPT | Performed by: INTERNAL MEDICINE

## 2025-05-27 PROCEDURE — G2211 COMPLEX E/M VISIT ADD ON: HCPCS | Performed by: INTERNAL MEDICINE

## 2025-05-27 NOTE — PROGRESS NOTES
Name: Corie Pal      : 1955      MRN: [id number]  Encounter Provider: Dada Hanna DO  Encounter Date: 2025   Encounter department: [facility] [place]  :  Assessment & Plan  Primary adenocarcinoma of upper lobe of left lung (HCC)  Pemetrexed, carboplatin, pembrolizumab.  Nausea and decreased appetite on days 4-6 of cycle 1 and 2.  Nausea did improve with ondansetron.  Sleep difficulties.  Daytime fatigue.  She asked about potential utility of medical marijuana.  We reviewed the process for registration, certification, etc.  I think this is an excellent suggestion.  Lastly, we discussed issues related to treatment and outcomes.  We reviewed that carboplatin is often discontinued after cycle 4 for issues of decreasing therapeutic ratio.  The patient and her  voiced understanding and agreement.  Orders:  •  NM PET CT skull base to mid thigh; Future    Diarrhea, unspecified type  Diarrhea lasting for several days.  No better with Imodium.  Abdominal cramping.  Had had episodes in the past, IBS?  Could be chemo related as well.  Possibly a combination.           Return in about 5 weeks (around 2025) for Imaging - See orders, Infusion - See Treatment Plan, Labs - See Treatment Plan.    History of Present Illness   Chief Complaint   Patient presents with   • Follow-up     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)  Staging form: Lung, AJCC V9  - Clinical: Stage BLACK (cN3, cM1a) - Signed by Filippo Lopez MD on 2025  Stage prefix: Initial diagnosis  Oncology History   Primary adenocarcinoma of upper lobe of left lung (HCC)   2025 Initial Diagnosis    Primary adenocarcinoma of upper lobe of left lung (HCC)     2025 -  Cancer Staged    Staging form: Lung, AJCC V9  - Clinical: Stage BLACK (cN3, cM1a) - Signed by Filippo Lopez MD on 2025  Stage prefix: Initial diagnosis       2025  "-  Chemotherapy    CARBOplatin (PARAPLATIN) IVPB (GOG AUC DOSING), 400.5 mg, 2 of 6 cycles  Administration: 400.5 mg (5/2/2025), 400.5 mg (5/23/2025)  pemetrexed (ALIMTA) chemo infusion, 500 mg/m2 = 755 mg, 2 of 6 cycles  Administration: 755 mg (5/2/2025), 755 mg (5/23/2025)  pembrolizumab (KEYTRUDA) IVPB, 200 mg, 2 of 6 cycles  Administration: 200 mg (5/2/2025), 200 mg (5/23/2025)        Pertinent Medical History      4/9/2025 biopsy of left posterior cervical lymph node shows adenocarcinoma, TTF-1 positive consistent with lung primary.  4/17/2025 PET/CT showed left perihilar masslike consolidation, bilateral cervical and supraclavicular hypermetabolic lymphadenopathy extending into the mediastinum.  MRI brain on 4/23/2025 shows no brain metastases.    5/2/2025 started pemetrexed, carboplatin, pembrolizumab.       Review of Systems   Constitutional:  Positive for fatigue. Negative for appetite change, diaphoresis and fever.   HENT:  Negative for sinus pain.    Eyes:  Negative for discharge.   Respiratory:  Positive for cough. Negative for shortness of breath.    Cardiovascular:  Negative for chest pain.   Gastrointestinal:  Positive for diarrhea and nausea. Negative for abdominal pain and constipation.   Endocrine: Negative for cold intolerance.   Genitourinary:  Negative for difficulty urinating and hematuria.   Musculoskeletal:  Negative for joint swelling.   Skin:  Negative for rash.   Allergic/Immunologic: Negative for environmental allergies.   Neurological:  Negative for dizziness and headaches.   Hematological:  Negative for adenopathy.   Psychiatric/Behavioral:  Negative for agitation.            Objective   /73 (BP Location: Right arm, Patient Position: Sitting, Cuff Size: Standard)   Pulse 76   Temp 97.6 °F (36.4 °C) (Temporal)   Ht 5' 7\" (1.702 m)   Wt 45.8 kg (101 lb)   SpO2 96%   BMI 15.82 kg/m²     Pain Screening:     ECOG ECOG Performance Status: 1 - Restricted in physically strenuous " activity but ambulatory and able to carry out work of a light or sedentary nature, e.g., light house work, office work     Physical Exam  Constitutional:       Appearance: She is well-developed.   HENT:      Head: Normocephalic and atraumatic.     Eyes:      Pupils: Pupils are equal, round, and reactive to light.       Cardiovascular:      Rate and Rhythm: Normal rate.      Heart sounds: No murmur heard.  Pulmonary:      Effort: No respiratory distress.      Breath sounds: No wheezing or rales.   Abdominal:      General: There is no distension.      Palpations: Abdomen is soft.      Tenderness: There is no abdominal tenderness. There is no rebound.     Musculoskeletal:         General: No tenderness.      Cervical back: Neck supple.   Lymphadenopathy:      Cervical: No cervical adenopathy.     Skin:     General: Skin is warm.      Findings: No rash.     Neurological:      Mental Status: She is alert and oriented to person, place, and time.      Deep Tendon Reflexes: Reflexes normal.     Psychiatric:         Thought Content: Thought content normal.         Labs: I have reviewed the following labs:  Lab Results   Component Value Date/Time    WBC 5.57 05/20/2025 09:39 AM    RBC 4.20 05/20/2025 09:39 AM    Hemoglobin 13.2 05/20/2025 09:39 AM    Hematocrit 41.5 05/20/2025 09:39 AM    MCV 99 (H) 05/20/2025 09:39 AM    MCH 31.4 05/20/2025 09:39 AM    RDW 14.2 05/20/2025 09:39 AM    Platelets 442 (H) 05/20/2025 09:39 AM    Segmented % 75 05/20/2025 09:39 AM    Lymphocytes % 8 (L) 05/20/2025 09:39 AM    Monocytes % 13 (H) 05/20/2025 09:39 AM    Eosinophils Relative 3 05/20/2025 09:39 AM    Basophils Relative 1 05/20/2025 09:39 AM    Immature Grans % 1 05/20/2025 09:39 AM    Absolute Neutrophils 4.15 05/20/2025 09:39 AM     Lab Results   Component Value Date/Time    Potassium 3.9 05/20/2025 09:39 AM    Potassium 4.2 02/28/2025 10:30 AM    Chloride 100 05/20/2025 09:39 AM    Chloride 98 02/28/2025 10:30 AM    Carbon Dioxide 25  02/28/2025 10:30 AM    CO2 28 05/20/2025 09:39 AM    BUN 8 05/20/2025 09:39 AM    BUN 7 02/28/2025 10:30 AM    Creatinine 0.47 (L) 05/20/2025 09:39 AM    Creatinine 0.4 (L) 02/28/2025 10:30 AM    Glucose, Fasting 101 (H) 05/20/2025 09:39 AM    Calcium 8.8 05/20/2025 09:39 AM    Calcium 9.3 02/28/2025 10:30 AM    AST 19 05/20/2025 09:39 AM    ALT 12 05/20/2025 09:39 AM    Alkaline Phosphatase 59 05/20/2025 09:39 AM    Total Protein 6.0 (L) 05/20/2025 09:39 AM    Albumin 3.6 05/20/2025 09:39 AM    Total Bilirubin 0.38 05/20/2025 09:39 AM    EGFR >90 02/28/2025 10:30 AM    eGFR 101 05/20/2025 09:39 AM

## 2025-05-27 NOTE — ASSESSMENT & PLAN NOTE
Pemetrexed, carboplatin, pembrolizumab.  Nausea and decreased appetite on days 4-6 of cycle 1 and 2.  Nausea did improve with ondansetron.  Sleep difficulties.  Daytime fatigue.  She asked about potential utility of medical marijuana.  We reviewed the process for registration, certification, etc.  I think this is an excellent suggestion.  Lastly, we discussed issues related to treatment and outcomes.  We reviewed that carboplatin is often discontinued after cycle 4 for issues of decreasing therapeutic ratio.  The patient and her  voiced understanding and agreement.  Orders:  •  NM PET CT skull base to mid thigh; Future

## 2025-05-27 NOTE — ASSESSMENT & PLAN NOTE
Diarrhea lasting for several days.  No better with Imodium.  Abdominal cramping.  Had had episodes in the past, IBS?  Could be chemo related as well.  Possibly a combination.

## 2025-05-28 ENCOUNTER — TELEPHONE (OUTPATIENT)
Age: 70
End: 2025-05-28

## 2025-05-28 ENCOUNTER — TELEPHONE (OUTPATIENT)
Dept: SURGERY | Facility: CLINIC | Age: 70
End: 2025-05-28

## 2025-05-28 DIAGNOSIS — C34.12 PRIMARY ADENOCARCINOMA OF UPPER LOBE OF LEFT LUNG (HCC): Primary | ICD-10-CM

## 2025-05-28 NOTE — TELEPHONE ENCOUNTER
Marika from [facility] radiology called, stating she got a fax from carol Serrano for the patients PET scan to be scheduled. Marika is asking for a call back to go over more clinical information needed and then to schedule the scan so we can call the patient to let them know the day and time. She can be directly reached at [phone]

## 2025-05-28 NOTE — TELEPHONE ENCOUNTER
Returned phone call to Marika. I confirmed with Marika that it is for re-staging, but she is reporting since patient had PET scan just last month, she is inquiring if there is another specific reason, secondary to potential issues with insurance. I confirmed with her we would inquire and return her call. She has no additional questions or concerns at this time.

## 2025-05-28 NOTE — TELEPHONE ENCOUNTER
Returned call and advised scan is for restaging and should be done closer to the end of June (roughly 2 months after last scan). Marika advises she just spoke with Julianne who is going to take care of everything. No further needs.

## 2025-05-28 NOTE — TELEPHONE ENCOUNTER
Left message on VM to call us to back to schedule CT scan and explained we were  unable to schedule  PET scan due to insurance coverage every 90 days and last one  was on 4/17/25.

## 2025-05-30 ENCOUNTER — TELEPHONE (OUTPATIENT)
Dept: PULMONOLOGY | Facility: CLINIC | Age: 70
End: 2025-05-30

## 2025-05-30 NOTE — TELEPHONE ENCOUNTER
Spoke with pt.to let her know CAT scan scheduled for 9:00 AM on 6/20/25 in [place].Instructions/directions given.

## 2025-06-02 ENCOUNTER — TELEPHONE (OUTPATIENT)
Age: 70
End: 2025-06-02

## 2025-06-02 NOTE — TELEPHONE ENCOUNTER
Pt is requesting for her FMLA to be extended until September of 2025.  She only needs a letter to give to work.  She said that she will get the fax number to send it to when someone calls her to let her know that its completed.

## 2025-06-10 ENCOUNTER — APPOINTMENT (OUTPATIENT)
Dept: LAB | Facility: HOSPITAL | Age: 70
End: 2025-06-10
Payer: MEDICARE

## 2025-06-10 DIAGNOSIS — C34.12 PRIMARY ADENOCARCINOMA OF UPPER LOBE OF LEFT LUNG (HCC): Chronic | ICD-10-CM

## 2025-06-10 LAB
ALBUMIN SERPL BCG-MCNC: 3.5 G/DL (ref 3.5–5)
ALP SERPL-CCNC: 62 U/L (ref 34–104)
ALT SERPL W P-5'-P-CCNC: 12 U/L (ref 7–52)
ANION GAP SERPL CALCULATED.3IONS-SCNC: 6 MMOL/L (ref 4–13)
AST SERPL W P-5'-P-CCNC: 21 U/L (ref 13–39)
BASOPHILS # BLD AUTO: 0.06 THOUSANDS/ÂΜL (ref 0–0.1)
BASOPHILS NFR BLD AUTO: 1 % (ref 0–1)
BILIRUB SERPL-MCNC: 0.31 MG/DL (ref 0.2–1)
BUN SERPL-MCNC: 9 MG/DL (ref 5–25)
CALCIUM SERPL-MCNC: 8.6 MG/DL (ref 8.4–10.2)
CHLORIDE SERPL-SCNC: 105 MMOL/L (ref 96–108)
CO2 SERPL-SCNC: 29 MMOL/L (ref 21–32)
CREAT SERPL-MCNC: 0.4 MG/DL (ref 0.6–1.3)
EOSINOPHIL # BLD AUTO: 0.2 THOUSAND/ÂΜL (ref 0–0.61)
EOSINOPHIL NFR BLD AUTO: 3 % (ref 0–6)
ERYTHROCYTE [DISTWIDTH] IN BLOOD BY AUTOMATED COUNT: 14.7 % (ref 11.6–15.1)
GFR SERPL CREATININE-BSD FRML MDRD: 106 ML/MIN/1.73SQ M
GLUCOSE SERPL-MCNC: 87 MG/DL (ref 65–140)
HCT VFR BLD AUTO: 37.2 % (ref 34.8–46.1)
HGB BLD-MCNC: 12.1 G/DL (ref 11.5–15.4)
IMM GRANULOCYTES # BLD AUTO: 0.04 THOUSAND/UL (ref 0–0.2)
IMM GRANULOCYTES NFR BLD AUTO: 1 % (ref 0–2)
LYMPHOCYTES # BLD AUTO: 0.73 THOUSANDS/ÂΜL (ref 0.6–4.47)
LYMPHOCYTES NFR BLD AUTO: 11 % (ref 14–44)
MCH RBC QN AUTO: 32.8 PG (ref 26.8–34.3)
MCHC RBC AUTO-ENTMCNC: 32.5 G/DL (ref 31.4–37.4)
MCV RBC AUTO: 101 FL (ref 82–98)
MONOCYTES # BLD AUTO: 0.74 THOUSAND/ÂΜL (ref 0.17–1.22)
MONOCYTES NFR BLD AUTO: 12 % (ref 4–12)
NEUTROPHILS # BLD AUTO: 4.64 THOUSANDS/ÂΜL (ref 1.85–7.62)
NEUTS SEG NFR BLD AUTO: 72 % (ref 43–75)
NRBC BLD AUTO-RTO: 0 /100 WBCS
PLATELET # BLD AUTO: 525 THOUSANDS/UL (ref 149–390)
PMV BLD AUTO: 8.8 FL (ref 8.9–12.7)
POTASSIUM SERPL-SCNC: 4.2 MMOL/L (ref 3.5–5.3)
PROT SERPL-MCNC: 6.3 G/DL (ref 6.4–8.4)
RBC # BLD AUTO: 3.69 MILLION/UL (ref 3.81–5.12)
SODIUM SERPL-SCNC: 140 MMOL/L (ref 135–147)
T3FREE SERPL-MCNC: 3.21 PG/ML (ref 2.5–3.9)
TSH SERPL DL<=0.05 MIU/L-ACNC: 4.18 UIU/ML (ref 0.45–4.5)
WBC # BLD AUTO: 6.41 THOUSAND/UL (ref 4.31–10.16)

## 2025-06-10 PROCEDURE — 36415 COLL VENOUS BLD VENIPUNCTURE: CPT

## 2025-06-10 PROCEDURE — 84443 ASSAY THYROID STIM HORMONE: CPT

## 2025-06-10 PROCEDURE — 85025 COMPLETE CBC W/AUTO DIFF WBC: CPT

## 2025-06-10 PROCEDURE — 80053 COMPREHEN METABOLIC PANEL: CPT

## 2025-06-10 PROCEDURE — 84481 FREE ASSAY (FT-3): CPT

## 2025-06-10 RX ORDER — CYANOCOBALAMIN 1000 UG/ML
1000 INJECTION, SOLUTION INTRAMUSCULAR; SUBCUTANEOUS ONCE
Status: CANCELLED | OUTPATIENT
Start: 2025-06-13 | End: 2025-06-13

## 2025-06-10 RX ORDER — SODIUM CHLORIDE 9 MG/ML
20 INJECTION, SOLUTION INTRAVENOUS ONCE
Status: CANCELLED | OUTPATIENT
Start: 2025-06-13

## 2025-06-13 ENCOUNTER — HOSPITAL ENCOUNTER (OUTPATIENT)
Dept: INFUSION CENTER | Facility: HOSPITAL | Age: 70
End: 2025-06-13
Attending: INTERNAL MEDICINE
Payer: MEDICARE

## 2025-06-13 VITALS
HEART RATE: 88 BPM | SYSTOLIC BLOOD PRESSURE: 153 MMHG | HEIGHT: 67 IN | OXYGEN SATURATION: 98 % | TEMPERATURE: 97.9 F | DIASTOLIC BLOOD PRESSURE: 70 MMHG | WEIGHT: 101.63 LBS | RESPIRATION RATE: 17 BRPM | BODY MASS INDEX: 15.95 KG/M2

## 2025-06-13 DIAGNOSIS — C34.12 PRIMARY ADENOCARCINOMA OF UPPER LOBE OF LEFT LUNG (HCC): Primary | Chronic | ICD-10-CM

## 2025-06-13 PROCEDURE — 96367 TX/PROPH/DG ADDL SEQ IV INF: CPT

## 2025-06-13 PROCEDURE — 96417 CHEMO IV INFUS EACH ADDL SEQ: CPT

## 2025-06-13 PROCEDURE — 96411 CHEMO IV PUSH ADDL DRUG: CPT

## 2025-06-13 PROCEDURE — 96413 CHEMO IV INFUSION 1 HR: CPT

## 2025-06-13 PROCEDURE — 96372 THER/PROPH/DIAG INJ SC/IM: CPT

## 2025-06-13 RX ORDER — CYANOCOBALAMIN 1000 UG/ML
1000 INJECTION, SOLUTION INTRAMUSCULAR; SUBCUTANEOUS ONCE
Status: COMPLETED | OUTPATIENT
Start: 2025-06-13 | End: 2025-06-13

## 2025-06-13 RX ORDER — SODIUM CHLORIDE 9 MG/ML
20 INJECTION, SOLUTION INTRAVENOUS ONCE
Status: COMPLETED | OUTPATIENT
Start: 2025-06-13 | End: 2025-06-13

## 2025-06-13 RX ADMIN — SODIUM CHLORIDE 200 MG: 9 INJECTION, SOLUTION INTRAVENOUS at 13:15

## 2025-06-13 RX ADMIN — DEXAMETHASONE SODIUM PHOSPHATE: 10 INJECTION, SOLUTION INTRAMUSCULAR; INTRAVENOUS at 11:52

## 2025-06-13 RX ADMIN — FOSAPREPITANT 150 MG: 150 INJECTION, POWDER, LYOPHILIZED, FOR SOLUTION INTRAVENOUS at 12:30

## 2025-06-13 RX ADMIN — CYANOCOBALAMIN 1000 MCG: 1000 INJECTION, SOLUTION INTRAMUSCULAR; SUBCUTANEOUS at 15:47

## 2025-06-13 RX ADMIN — CARBOPLATIN 397 MG: 10 INJECTION, SOLUTION INTRAVENOUS at 14:39

## 2025-06-13 RX ADMIN — PEMETREXED DISODIUM 755 MG: 500 INJECTION, POWDER, LYOPHILIZED, FOR SOLUTION INTRAVENOUS at 14:19

## 2025-06-13 RX ADMIN — SODIUM CHLORIDE 20 ML/HR: 0.9 INJECTION, SOLUTION INTRAVENOUS at 11:50

## 2025-06-13 NOTE — PLAN OF CARE
Problem: Potential for Falls  Goal: Patient will remain free of falls  Description: INTERVENTIONS:  - Educate patient/family on patient safety including physical limitations  - Instruct patient to call for assistance with activity   - Consider consulting OT/PT to assist with strengthening/mobility based on AM PAC & JH-HLM score  - Consult OT/PT to assist with strengthening/mobility   - Keep Call bell within reach  - Keep bed low and locked with side rails adjusted as appropriate  - Keep care items and personal belongings within reach  - Initiate and maintain comfort rounds  - Consider moving patient to room near nurses station  Outcome: Progressing     Problem: INFECTION - ADULT  Goal: Absence or prevention of progression during hospitalization  Description: INTERVENTIONS:  - Assess and monitor for signs and symptoms of infection  - Monitor lab/diagnostic results  - Monitor all insertion sites, i.e. indwelling lines, tubes, and drains  - Monitor endotracheal if appropriate and nasal secretions for changes in amount and color  - [place] appropriate cooling/warming therapies per order  - Administer medications as ordered  - Instruct and encourage patient and family to use good hand hygiene technique  - Identify and instruct in appropriate isolation precautions for identified infection/condition  Outcome: Progressing     Problem: Knowledge Deficit  Goal: Patient/family/caregiver demonstrates understanding of disease process, treatment plan, medications, and discharge instructions  Description: Complete learning assessment and assess knowledge base.  Interventions:  - Provide teaching at level of understanding  - Provide teaching via preferred learning methods  Outcome: Progressing

## 2025-06-13 NOTE — PROGRESS NOTES
Recent labs reviewed. Pt tolerated Keytruda, Alimta, & Carboplatin and B12 injection in L deltoid well without any complications. PIV removed without incident.     Corie Pal is aware of future appt on 7/3/25 at 12PM.     AVS declined by Corie Pal.    Pt discharged off unit in stable condition with all personal belongings accompanied by .

## 2025-06-17 ENCOUNTER — PATIENT MESSAGE (OUTPATIENT)
Dept: PULMONOLOGY | Facility: CLINIC | Age: 70
End: 2025-06-17

## 2025-06-17 DIAGNOSIS — J44.9 STAGE 2 MODERATE COPD BY GOLD CLASSIFICATION (HCC): ICD-10-CM

## 2025-06-17 RX ORDER — FLUTICASONE FUROATE, UMECLIDINIUM BROMIDE AND VILANTEROL TRIFENATATE 200; 62.5; 25 UG/1; UG/1; UG/1
1 POWDER RESPIRATORY (INHALATION) DAILY
Qty: 60 BLISTER | Refills: 11 | Status: SHIPPED | OUTPATIENT
Start: 2025-06-17 | End: 2026-06-12

## 2025-06-20 ENCOUNTER — HOSPITAL ENCOUNTER (OUTPATIENT)
Dept: CT IMAGING | Facility: HOSPITAL | Age: 70
End: 2025-06-20
Attending: INTERNAL MEDICINE
Payer: MEDICARE

## 2025-06-20 DIAGNOSIS — C34.12 PRIMARY ADENOCARCINOMA OF UPPER LOBE OF LEFT LUNG (HCC): ICD-10-CM

## 2025-06-20 PROCEDURE — 71260 CT THORAX DX C+: CPT

## 2025-06-20 PROCEDURE — 74177 CT ABD & PELVIS W/CONTRAST: CPT

## 2025-06-20 RX ADMIN — IOHEXOL 70 ML: 350 INJECTION, SOLUTION INTRAVENOUS at 09:14

## 2025-06-27 RX ORDER — SODIUM CHLORIDE 9 MG/ML
20 INJECTION, SOLUTION INTRAVENOUS ONCE
OUTPATIENT
Start: 2025-07-03

## 2025-06-30 ENCOUNTER — APPOINTMENT (OUTPATIENT)
Dept: LAB | Facility: HOSPITAL | Age: 70
End: 2025-06-30
Payer: MEDICARE

## 2025-06-30 ENCOUNTER — OFFICE VISIT (OUTPATIENT)
Dept: HEMATOLOGY ONCOLOGY | Facility: CLINIC | Age: 70
End: 2025-06-30
Payer: MEDICARE

## 2025-06-30 VITALS
SYSTOLIC BLOOD PRESSURE: 111 MMHG | HEART RATE: 94 BPM | BODY MASS INDEX: 16.01 KG/M2 | OXYGEN SATURATION: 96 % | DIASTOLIC BLOOD PRESSURE: 70 MMHG | WEIGHT: 102 LBS | HEIGHT: 67 IN | TEMPERATURE: 98.4 F

## 2025-06-30 DIAGNOSIS — C34.12 PRIMARY ADENOCARCINOMA OF UPPER LOBE OF LEFT LUNG (HCC): ICD-10-CM

## 2025-06-30 DIAGNOSIS — C34.12 PRIMARY ADENOCARCINOMA OF UPPER LOBE OF LEFT LUNG (HCC): Primary | Chronic | ICD-10-CM

## 2025-06-30 LAB
ALBUMIN SERPL BCG-MCNC: 3.7 G/DL (ref 3.5–5)
ALP SERPL-CCNC: 70 U/L (ref 34–104)
ALT SERPL W P-5'-P-CCNC: 18 U/L (ref 7–52)
ANION GAP SERPL CALCULATED.3IONS-SCNC: 9 MMOL/L (ref 4–13)
AST SERPL W P-5'-P-CCNC: 26 U/L (ref 13–39)
BASOPHILS # BLD AUTO: 0.06 THOUSANDS/ÂΜL (ref 0–0.1)
BASOPHILS NFR BLD AUTO: 1 % (ref 0–1)
BILIRUB SERPL-MCNC: 0.42 MG/DL (ref 0.2–1)
BUN SERPL-MCNC: 9 MG/DL (ref 5–25)
CALCIUM SERPL-MCNC: 8.6 MG/DL (ref 8.4–10.2)
CHLORIDE SERPL-SCNC: 100 MMOL/L (ref 96–108)
CO2 SERPL-SCNC: 30 MMOL/L (ref 21–32)
CREAT SERPL-MCNC: 0.58 MG/DL (ref 0.6–1.3)
EOSINOPHIL # BLD AUTO: 0.09 THOUSAND/ÂΜL (ref 0–0.61)
EOSINOPHIL NFR BLD AUTO: 2 % (ref 0–6)
ERYTHROCYTE [DISTWIDTH] IN BLOOD BY AUTOMATED COUNT: 14.9 % (ref 11.6–15.1)
GFR SERPL CREATININE-BSD FRML MDRD: 94 ML/MIN/1.73SQ M
GLUCOSE SERPL-MCNC: 120 MG/DL (ref 65–140)
HCT VFR BLD AUTO: 31.4 % (ref 34.8–46.1)
HGB BLD-MCNC: 10.4 G/DL (ref 11.5–15.4)
IMM GRANULOCYTES # BLD AUTO: 0.05 THOUSAND/UL (ref 0–0.2)
IMM GRANULOCYTES NFR BLD AUTO: 1 % (ref 0–2)
LYMPHOCYTES # BLD AUTO: 0.77 THOUSANDS/ÂΜL (ref 0.6–4.47)
LYMPHOCYTES NFR BLD AUTO: 17 % (ref 14–44)
MCH RBC QN AUTO: 33.5 PG (ref 26.8–34.3)
MCHC RBC AUTO-ENTMCNC: 33.1 G/DL (ref 31.4–37.4)
MCV RBC AUTO: 101 FL (ref 82–98)
MONOCYTES # BLD AUTO: 0.65 THOUSAND/ÂΜL (ref 0.17–1.22)
MONOCYTES NFR BLD AUTO: 14 % (ref 4–12)
NEUTROPHILS # BLD AUTO: 2.98 THOUSANDS/ÂΜL (ref 1.85–7.62)
NEUTS SEG NFR BLD AUTO: 65 % (ref 43–75)
NRBC BLD AUTO-RTO: 0 /100 WBCS
PLATELET # BLD AUTO: 505 THOUSANDS/UL (ref 149–390)
PMV BLD AUTO: 8.5 FL (ref 8.9–12.7)
POTASSIUM SERPL-SCNC: 3.6 MMOL/L (ref 3.5–5.3)
PROT SERPL-MCNC: 6.5 G/DL (ref 6.4–8.4)
RBC # BLD AUTO: 3.1 MILLION/UL (ref 3.81–5.12)
SODIUM SERPL-SCNC: 139 MMOL/L (ref 135–147)
T3FREE SERPL-MCNC: 2.84 PG/ML (ref 2.5–3.9)
T4 FREE SERPL-MCNC: 0.49 NG/DL (ref 0.61–1.12)
TSH SERPL DL<=0.05 MIU/L-ACNC: 7.52 UIU/ML (ref 0.45–4.5)
WBC # BLD AUTO: 4.6 THOUSAND/UL (ref 4.31–10.16)

## 2025-06-30 PROCEDURE — 84439 ASSAY OF FREE THYROXINE: CPT

## 2025-06-30 PROCEDURE — 80053 COMPREHEN METABOLIC PANEL: CPT

## 2025-06-30 PROCEDURE — 36415 COLL VENOUS BLD VENIPUNCTURE: CPT

## 2025-06-30 PROCEDURE — 84481 FREE ASSAY (FT-3): CPT

## 2025-06-30 PROCEDURE — 84443 ASSAY THYROID STIM HORMONE: CPT

## 2025-06-30 PROCEDURE — 99215 OFFICE O/P EST HI 40 MIN: CPT | Performed by: INTERNAL MEDICINE

## 2025-06-30 PROCEDURE — 85025 COMPLETE CBC W/AUTO DIFF WBC: CPT

## 2025-06-30 PROCEDURE — G2211 COMPLEX E/M VISIT ADD ON: HCPCS | Performed by: INTERNAL MEDICINE

## 2025-06-30 NOTE — PROGRESS NOTES
Name: Corie Pal      : 1955      MRN: [id number]  Encounter Provider: Dada Hanna DO  Encounter Date: 2025   Encounter department: [facility] [place]  :  Assessment & Plan  Primary adenocarcinoma of upper lobe of left lung (HCC)  Pemetrexed, carbo, pembrolizumab.  Toxicities have been variable over her first 3 fascicles of treatment.  Nausea, diarrhea, fatigue.  Despite this, weight is stable over the past 3 months, fortunately.  CBC shows hemoglobin 10.5, otherwise normal.  CMP normal.  Recent CT CAP shows decrease in bilateral hilar disease and left pulmonary nodules.  New 4 mm right pulmonary nodule is noted, question inflammatory.  Overall I believe she is responding well.  Tolerance is fair/good.  Reviewed that options would include continuation, pemetrexed dose reduction, alternative agents.  She prefers continue current therapy, understanding that adjustments could be made accordingly.           Return in about 4 weeks (around 2025) for Infusion - See Treatment Plan, Labs - See Treatment Plan.    Assessment & Plan         History of Present Illness   Chief Complaint   Patient presents with   • Follow-up       History of Present Illness         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)  Staging form: Lung, AJCC V9  - Clinical: Stage BLACK (cN3, cM1a) - Signed by Filippo Lopez MD on 2025  Stage prefix: Initial diagnosis  Oncology History   Primary adenocarcinoma of upper lobe of left lung (HCC)   2025 Initial Diagnosis    Primary adenocarcinoma of upper lobe of left lung (HCC)     2025 -  Cancer Staged    Staging form: Lung, AJCC V9  - Clinical: Stage BLACK (cN3, cM1a) - Signed by Filippo Lopez MD on 2025  Stage prefix: Initial diagnosis       2025 -  Chemotherapy    CARBOplatin (PARAPLATIN) IVPB (GOG AUC DOSING), 400.5 mg, 3 of 6 cycles  Administration: 400.5 mg  "(5/2/2025), 397 mg (6/13/2025), 400.5 mg (5/23/2025)  pemetrexed (ALIMTA) chemo infusion, 500 mg/m2 = 755 mg, 3 of 6 cycles  Administration: 755 mg (5/2/2025), 755 mg (6/13/2025), 755 mg (5/23/2025)  pembrolizumab (KEYTRUDA) IVPB, 200 mg, 3 of 6 cycles  Administration: 200 mg (5/2/2025), 200 mg (6/13/2025), 200 mg (5/23/2025)        Pertinent Medical History      4/9/2025 biopsy of left posterior cervical lymph node shows adenocarcinoma, TTF-1 positive consistent with lung primary.  4/17/2025 PET/CT showed left perihilar masslike consolidation, bilateral cervical and supraclavicular hypermetabolic lymphadenopathy extending into the mediastinum.  MRI brain on 4/23/2025 shows no brain metastases.    5/2/2025 started pemetrexed, carboplatin, pembrolizumab.       Review of Systems   Constitutional:  Negative for appetite change, diaphoresis, fatigue and fever.   HENT:  Negative for sinus pain.    Eyes:  Negative for discharge.   Respiratory:  Negative for cough and shortness of breath.    Cardiovascular:  Negative for chest pain.   Gastrointestinal:  Positive for diarrhea and nausea. Negative for abdominal pain and constipation.   Endocrine: Negative for cold intolerance.   Genitourinary:  Negative for difficulty urinating and hematuria.   Musculoskeletal:  Negative for joint swelling.   Skin:  Negative for rash.   Allergic/Immunologic: Negative for environmental allergies.   Neurological:  Negative for dizziness and headaches.   Hematological:  Negative for adenopathy.   Psychiatric/Behavioral:  Negative for agitation.            Objective   /70 (BP Location: Left arm, Patient Position: Sitting, Cuff Size: Standard)   Pulse 94   Temp 98.4 °F (36.9 °C) (Temporal)   Ht 5' 7.01\" (1.702 m)   Wt 46.3 kg (102 lb)   SpO2 96%   BMI 15.97 kg/m²     Pain Screening:     ECOG ECOG Performance Status: 1 - Restricted in physically strenuous activity but ambulatory and able to carry out work of a light or sedentary " nature, e.g., light house work, office work   Physical Exam  Constitutional:       Appearance: She is well-developed.   HENT:      Head: Normocephalic and atraumatic.     Eyes:      Pupils: Pupils are equal, round, and reactive to light.       Cardiovascular:      Rate and Rhythm: Normal rate.      Heart sounds: No murmur heard.  Pulmonary:      Effort: No respiratory distress.      Breath sounds: No wheezing or rales.   Abdominal:      General: There is no distension.      Palpations: Abdomen is soft.      Tenderness: There is no abdominal tenderness. There is no rebound.     Musculoskeletal:         General: No tenderness.      Cervical back: Neck supple.   Lymphadenopathy:      Cervical: No cervical adenopathy.     Skin:     General: Skin is warm.      Findings: No rash.     Neurological:      Mental Status: She is alert and oriented to person, place, and time.      Deep Tendon Reflexes: Reflexes normal.     Psychiatric:         Thought Content: Thought content normal.         Physical Exam             Results

## 2025-06-30 NOTE — ASSESSMENT & PLAN NOTE
Pemetrexed, carbo, pembrolizumab.  Toxicities have been variable over her first 3 fascicles of treatment.  Nausea, diarrhea, fatigue.  Despite this, weight is stable over the past 3 months, fortunately.  CBC shows hemoglobin 10.5, otherwise normal.  CMP normal.  Recent CT CAP shows decrease in bilateral hilar disease and left pulmonary nodules.  New 4 mm right pulmonary nodule is noted, question inflammatory.  Overall I believe she is responding well.  Tolerance is fair/good.  Reviewed that options would include continuation, pemetrexed dose reduction, alternative agents.  She prefers continue current therapy, understanding that adjustments could be made accordingly.

## 2025-07-03 ENCOUNTER — HOSPITAL ENCOUNTER (OUTPATIENT)
Dept: INFUSION CENTER | Facility: HOSPITAL | Age: 70
Discharge: HOME | End: 2025-07-03
Attending: INTERNAL MEDICINE
Payer: MEDICARE

## 2025-07-03 VITALS
DIASTOLIC BLOOD PRESSURE: 77 MMHG | HEART RATE: 69 BPM | SYSTOLIC BLOOD PRESSURE: 144 MMHG | WEIGHT: 101.19 LBS | BODY MASS INDEX: 15.88 KG/M2 | TEMPERATURE: 97.6 F | HEIGHT: 67 IN | RESPIRATION RATE: 17 BRPM | OXYGEN SATURATION: 100 %

## 2025-07-03 DIAGNOSIS — C34.12 PRIMARY ADENOCARCINOMA OF UPPER LOBE OF LEFT LUNG (HCC): Primary | Chronic | ICD-10-CM

## 2025-07-03 DIAGNOSIS — C34.12 PRIMARY ADENOCARCINOMA OF UPPER LOBE OF LEFT LUNG (HCC): Primary | ICD-10-CM

## 2025-07-03 PROCEDURE — 96411 CHEMO IV PUSH ADDL DRUG: CPT

## 2025-07-03 PROCEDURE — 96367 TX/PROPH/DG ADDL SEQ IV INF: CPT

## 2025-07-03 PROCEDURE — 96413 CHEMO IV INFUSION 1 HR: CPT

## 2025-07-03 PROCEDURE — 96417 CHEMO IV INFUS EACH ADDL SEQ: CPT

## 2025-07-03 RX ORDER — SODIUM CHLORIDE 9 MG/ML
20 INJECTION, SOLUTION INTRAVENOUS ONCE
Status: COMPLETED | OUTPATIENT
Start: 2025-07-03 | End: 2025-07-03

## 2025-07-03 RX ADMIN — SODIUM CHLORIDE 200 MG: 9 INJECTION, SOLUTION INTRAVENOUS at 13:16

## 2025-07-03 RX ADMIN — SODIUM CHLORIDE 20 ML/HR: 0.9 INJECTION, SOLUTION INTRAVENOUS at 12:40

## 2025-07-03 RX ADMIN — PEMETREXED DISODIUM 755 MG: 500 INJECTION, POWDER, LYOPHILIZED, FOR SOLUTION INTRAVENOUS at 14:10

## 2025-07-03 RX ADMIN — DEXAMETHASONE SODIUM PHOSPHATE: 10 INJECTION, SOLUTION INTRAMUSCULAR; INTRAVENOUS at 12:04

## 2025-07-03 RX ADMIN — FOSAPREPITANT 150 MG: 150 INJECTION, POWDER, LYOPHILIZED, FOR SOLUTION INTRAVENOUS at 12:33

## 2025-07-03 RX ADMIN — CARBOPLATIN 397 MG: 10 INJECTION, SOLUTION INTRAVENOUS at 14:36

## 2025-07-03 NOTE — PROGRESS NOTES
Corie Pal  tolerated keytruda/alimta/carboplatin treatment well with no complications.      Corie Pal is aware of future appt on 8/7/25 at 1230.     AVS printed and given to Corie Pal:  No (Declined by Corie Pal)

## 2025-07-10 ENCOUNTER — NURSE TRIAGE (OUTPATIENT)
Age: 70
End: 2025-07-10

## 2025-07-10 DIAGNOSIS — B37.0 ORAL THRUSH: Primary | ICD-10-CM

## 2025-07-10 RX ORDER — NYSTATIN 100000 [USP'U]/ML
SUSPENSION ORAL
Qty: 473 ML | Refills: 1 | Status: SHIPPED | OUTPATIENT
Start: 2025-07-10 | End: 2025-07-20

## 2025-07-10 NOTE — TELEPHONE ENCOUNTER
REASON FOR CONVERSATION: Mouth Lesions    SYMPTOMS: mouth sores/sides roof of mouth and back of throat.  Appears white/grey in color.  Difficult to eat Is ameena fluids at this time  Taking Tyenol prn for the pain    OTHER HEALTH INFORMATION: Lubjulian Ca On Treatment with Keytruda Alimta Carbo Had Tx last week C4      PROTOCOL DISPOSITION: See in 24 Hours/Callback From Office Today    CARE ADVICE PROVIDED: *Continue to drink fluid so to avoid dehydration Advised drink 4-6 glasses/day  *Advised nutritional supplements Try Magic Cup and [place] instant breakfast.  *Advised to continue to Tylenol,informed there is a liquid Tylenol over the counter    PRACTICE FOLLOW-UP: Call back to pt with medication RX /post Tx IV hydration added to treatment plan  Pt uses:  [facility] [id number] [place] [address] - [address] [facility]  [address] [facility] [place] [address]  Phone: [phone]  Fax: [phone]           Reason for Disposition   Painful erythema, edema, or ulcers that make swallowing difficult and tried home care    Answer Assessment - Initial Assessment Questions  1. What is the cancer diagnosis, and what treatment is the patient receiving? Review past medical history.  Lung Ca Treatment was last week Keytruda Alimta and Carbo    2. What medications is the patient taking? Obtain medication history.   See med list Take Tylenol for the mouth pain but difficult to swallow pills  Tried salt water rinses not helping much  Uses an inhaler Trilogy does rinse and brush teeth    3. Ask the patient to describe symptoms in detail: Affected location(s) (e.g., lips, tongue, mucous membranes, gingiva [gums], teeth, denture-bearing area)?   Everywhere in her mouth under tongue roof mouth and back of throat   Look white grey color    4. Do you have any of the following symptoms: (Erythema, ulcerations, blisters, white patches or sticky white film, pain, difficulty swallowing, hoarseness, taste alterations, fever, decreased  oral intake, sore throat)?   Yes white grey appearance pain diff swallowing had a fever several days ago only Fri AM sore throat  Oral intake is less eating less  Sips on water and lemonade helped   Weight lose of 7 lbs in last week    5. Obtain history regarding current oral hygiene practices, social history of tobacco and alcohol use.   Practices good oral hygiene    6. Is the patient's condition stable or worsening?   Oral mouth sore are not getting better    7. Are any relieving factors effective? (Ice chips, cold water rinses, local or systemic analgesics)?   Using Tylenol prn Tried salt water rinses Warm tea  8 Assess nutritional intake, including any symptoms of dehydration.   Sipping on fluids  Does try some foods but stated gags her and things do not taste right    Protocols used: ONC-Oral Mucositis-ADULT-[place]

## 2025-07-10 NOTE — TELEPHONE ENCOUNTER
Returned call to patient regarding mouth sores.     Requested picture be sent to [facility] but PT unable to do so. PT was advised that per triage call, the symptoms sound like oral thrust and Nystatin oral solution was sent to [facility]. PT was instructed on how to use (swish 5ml for as long as possible, then swallow 4 times a day, for 10 days) and given a timeframe (two days) to call office if there was no improvement. PT was instructed to report to ER if symptoms worsen, ie: fevers or SOB. PT stated that she had been using mouthwash after corticosteroid inhaler. RN instructed PT to refrain from use (of mouthwash) for now as that could make mouth sores more irritated. PT verbalized understanding and is agreeable to plan.

## 2025-07-19 ENCOUNTER — APPOINTMENT (EMERGENCY)
Dept: CT IMAGING | Facility: HOSPITAL | Age: 70
End: 2025-07-19
Payer: MEDICARE

## 2025-07-19 ENCOUNTER — HOSPITAL ENCOUNTER (EMERGENCY)
Facility: HOSPITAL | Age: 70
Discharge: HOME | End: 2025-07-19
Attending: EMERGENCY MEDICINE | Admitting: EMERGENCY MEDICINE
Payer: MEDICARE

## 2025-07-19 VITALS
DIASTOLIC BLOOD PRESSURE: 62 MMHG | BODY MASS INDEX: 15.7 KG/M2 | WEIGHT: 100 LBS | HEIGHT: 67 IN | HEART RATE: 81 BPM | SYSTOLIC BLOOD PRESSURE: 124 MMHG | TEMPERATURE: 97.3 F | OXYGEN SATURATION: 100 % | RESPIRATION RATE: 18 BRPM

## 2025-07-19 DIAGNOSIS — K59.00 CONSTIPATION, UNSPECIFIED CONSTIPATION TYPE: Primary | ICD-10-CM

## 2025-07-19 DIAGNOSIS — K62.89 PROCTITIS: ICD-10-CM

## 2025-07-19 LAB
ALBUMIN SERPL BCG-MCNC: 3.8 G/DL (ref 3.5–5)
ALP SERPL-CCNC: 78 U/L (ref 34–104)
ALT SERPL W P-5'-P-CCNC: 21 U/L (ref 7–52)
ANION GAP SERPL CALCULATED.3IONS-SCNC: 11 MMOL/L (ref 4–13)
ANISOCYTOSIS BLD QL SMEAR: PRESENT
AST SERPL W P-5'-P-CCNC: 23 U/L (ref 13–39)
BASOPHILS # BLD MANUAL: 0.05 THOUSAND/UL (ref 0–0.1)
BASOPHILS NFR MAR MANUAL: 2 % (ref 0–1)
BILIRUB SERPL-MCNC: 0.39 MG/DL (ref 0.2–1)
BILIRUB UR QL STRIP: NEGATIVE
BUN SERPL-MCNC: 13 MG/DL (ref 5–25)
CALCIUM SERPL-MCNC: 8.7 MG/DL (ref 8.4–10.2)
CHLORIDE SERPL-SCNC: 100 MMOL/L (ref 96–108)
CLARITY UR: CLEAR
CO2 SERPL-SCNC: 27 MMOL/L (ref 21–32)
COLOR UR: YELLOW
CREAT SERPL-MCNC: 0.85 MG/DL (ref 0.6–1.3)
EOSINOPHIL # BLD MANUAL: 0.09 THOUSAND/UL (ref 0–0.4)
EOSINOPHIL NFR BLD MANUAL: 4 % (ref 0–6)
ERYTHROCYTE [DISTWIDTH] IN BLOOD BY AUTOMATED COUNT: 13.6 % (ref 11.6–15.1)
GFR SERPL CREATININE-BSD FRML MDRD: 70 ML/MIN/1.73SQ M
GLUCOSE SERPL-MCNC: 98 MG/DL (ref 65–140)
GLUCOSE UR STRIP-MCNC: NEGATIVE MG/DL
HCT VFR BLD AUTO: 28.4 % (ref 34.8–46.1)
HGB BLD-MCNC: 9.6 G/DL (ref 11.5–15.4)
HGB UR QL STRIP.AUTO: NEGATIVE
KETONES UR STRIP-MCNC: NEGATIVE MG/DL
LEUKOCYTE ESTERASE UR QL STRIP: NEGATIVE
LG PLATELETS BLD QL SMEAR: PRESENT
LIPASE SERPL-CCNC: 123 U/L (ref 11–82)
LYMPHOCYTES # BLD AUTO: 1 THOUSAND/UL (ref 0.6–4.47)
LYMPHOCYTES # BLD AUTO: 40 % (ref 14–44)
MACROCYTES BLD QL AUTO: PRESENT
MCH RBC QN AUTO: 33.1 PG (ref 26.8–34.3)
MCHC RBC AUTO-ENTMCNC: 33.8 G/DL (ref 31.4–37.4)
MCV RBC AUTO: 98 FL (ref 82–98)
METAMYELOCYTE ABSOLUTE CT: 0.02 THOUSAND/UL (ref 0–0.1)
METAMYELOCYTES NFR BLD MANUAL: 1 % (ref 0–1)
MONOCYTES # BLD AUTO: 0.45 THOUSAND/UL (ref 0–1.22)
MONOCYTES NFR BLD: 19 % (ref 4–12)
MYELOCYTE ABSOLUTE CT: 0.05 THOUSAND/UL (ref 0–0.1)
MYELOCYTES NFR BLD MANUAL: 2 % (ref 0–1)
NEUTROPHILS # BLD MANUAL: 0.71 THOUSAND/UL (ref 1.85–7.62)
NEUTS BAND NFR BLD MANUAL: 1 % (ref 0–8)
NEUTS SEG NFR BLD AUTO: 29 % (ref 43–75)
NITRITE UR QL STRIP: NEGATIVE
PH UR STRIP.AUTO: 6 [PH]
PLATELET # BLD AUTO: 569 THOUSANDS/UL (ref 149–390)
PLATELET BLD QL SMEAR: ABNORMAL
PMV BLD AUTO: 9.3 FL (ref 8.9–12.7)
POTASSIUM SERPL-SCNC: 3.2 MMOL/L (ref 3.5–5.3)
PROT SERPL-MCNC: 6.9 G/DL (ref 6.4–8.4)
PROT UR STRIP-MCNC: NEGATIVE MG/DL
RBC # BLD AUTO: 2.9 MILLION/UL (ref 3.81–5.12)
RBC MORPH BLD: PRESENT
SODIUM SERPL-SCNC: 138 MMOL/L (ref 135–147)
SP GR UR STRIP.AUTO: <=1.005 (ref 1–1.03)
SPHEROCYTES BLD QL SMEAR: PRESENT
UROBILINOGEN UR QL STRIP.AUTO: 0.2 E.U./DL
VARIANT LYMPHS # BLD AUTO: 2 %
WBC # BLD AUTO: 2.37 THOUSAND/UL (ref 4.31–10.16)

## 2025-07-19 PROCEDURE — 83690 ASSAY OF LIPASE: CPT | Performed by: EMERGENCY MEDICINE

## 2025-07-19 PROCEDURE — 36415 COLL VENOUS BLD VENIPUNCTURE: CPT | Performed by: EMERGENCY MEDICINE

## 2025-07-19 PROCEDURE — 80053 COMPREHEN METABOLIC PANEL: CPT | Performed by: EMERGENCY MEDICINE

## 2025-07-19 PROCEDURE — 81003 URINALYSIS AUTO W/O SCOPE: CPT | Performed by: EMERGENCY MEDICINE

## 2025-07-19 PROCEDURE — 99285 EMERGENCY DEPT VISIT HI MDM: CPT | Performed by: EMERGENCY MEDICINE

## 2025-07-19 PROCEDURE — 99283 EMERGENCY DEPT VISIT LOW MDM: CPT

## 2025-07-19 PROCEDURE — 74177 CT ABD & PELVIS W/CONTRAST: CPT

## 2025-07-19 PROCEDURE — 85007 BL SMEAR W/DIFF WBC COUNT: CPT | Performed by: EMERGENCY MEDICINE

## 2025-07-19 PROCEDURE — 85027 COMPLETE CBC AUTOMATED: CPT | Performed by: EMERGENCY MEDICINE

## 2025-07-19 RX ORDER — METRONIDAZOLE 500 MG/1
500 TABLET ORAL ONCE
Status: COMPLETED | OUTPATIENT
Start: 2025-07-19 | End: 2025-07-19

## 2025-07-19 RX ORDER — METRONIDAZOLE 500 MG/1
500 TABLET ORAL EVERY 8 HOURS SCHEDULED
Qty: 21 TABLET | Refills: 0 | Status: CANCELLED | OUTPATIENT
Start: 2025-07-19 | End: 2025-07-26

## 2025-07-19 RX ADMIN — IOHEXOL 50 ML: 240 INJECTION, SOLUTION INTRATHECAL; INTRAVASCULAR; INTRAVENOUS; ORAL at 12:25

## 2025-07-19 RX ADMIN — IOHEXOL 85 ML: 350 INJECTION, SOLUTION INTRAVENOUS at 12:25

## 2025-07-19 RX ADMIN — METRONIDAZOLE 500 MG: 500 TABLET ORAL at 16:27

## 2025-07-19 NOTE — ED PROVIDER NOTES
Time reflects when diagnosis was documented in both MDM as applicable and the Disposition within this note       Time User Action Codes Description Comment    7/19/2025  4:20 PM Rex Lira Add [K59.00] Constipation, unspecified constipation type     7/19/2025  4:20 PM Rex Lira Add [K62.89] Proctitis           ED Disposition       ED Disposition   Discharge    Condition   Stable    Date/Time   Sat Jul 19, 2025  4:20 PM    Comment   Corie Pal discharge to home/self care.                   Assessment & Plan       Medical Decision Making  Impression is constipation but in the setting of lower abdominal pain and chemotherapy on 3 July.  Need to ensure no obstruction, inflammation or other cause.  Patient may just have no bowel movement due to no stool.  She did describe having p.o. decreased last week in the setting of her nausea, vomiting and diarrhea.    Plan workup:  ABd pain workup  Ct will be with oral contrast     Amount and/or Complexity of Data Reviewed  Labs: ordered. Decision-making details documented in ED Course.  Radiology: ordered. Decision-making details documented in ED Course.    Risk  Prescription drug management.        ED Course as of 07/21/25 1056   Sat Jul 19, 2025   1134 Hemoglobin(!): 9.6   1134 WBC(!): 2.37   1134 Potassium(!): 3.2   1134 Platelet Count(!): 569  CBC and chemistry reviewed.  Lipase slightly abnormal.   1134 LIPASE(!): 123   1134 Manual Differential(PHLEBS Do Not Order)   1515 CT abdomen pelvis with contrast  Suggestion of distal rectal wall thickening with mild perirectal fat stranding. Mild rectal distention with stool. Findings may represent a mild proctitis/stercoral colitis. Correlate clinically.   1619 Discussed with patient about findings on the CAT scan.  Patient wishes to go home.  Will start on Flagyl and advised patient oral contrast will likely act as a laxative.  Patient may also benefit from small dose of mag citrate if she does not have a bowel movement  later today.  Very strict return precautions given.       Medications   iohexol (OMNIPAQUE) 350 MG/ML injection (MULTI-DOSE) 85 mL (85 mL Intravenous Given 7/19/25 1225)   iohexol (OMNIPAQUE) 240 MG/ML solution 50 mL (50 mL Oral Given 7/19/25 1225)   metroNIDAZOLE (FLAGYL) tablet 500 mg (500 mg Oral Given 7/19/25 1627)       ED Risk Strat Scores        Assessment & Plan  Constipation, unspecified constipation type  Pt with moderate stool in colon (appears mostly in proximal colon).  Oral contrast is seen up to terminal ileum and likely will aid in causing good BM.  Stool ball in rectum is moderate sized.  Would NOT attempt any enema or fecal disimpaction attempt based on size being only moderate AND because ANC is <1000 today (although pt has no fevers)   -Expect BM later today  -Recommended use of 150cc mag citrate only if no BM today     Proctitis  No fevers but ANC is low at 710. Pt has mild pain in abd at time of d/c but wishes to go home. Plan is for flagyl.   -Started on flagyl in ER and needs to continue TID x 7 days  -Advised PCP follow up and strict return precautions                  No data recorded                            History of Present Illness       Chief Complaint   Patient presents with    Constipation     Pt arrives with c/o constipation x1 week. Pt took meds without relief on Monday/Tuesday.       Past Medical History[1]   Past Surgical History[2]   Family History[3]   Social History[4]   E-Cigarette/Vaping    E-Cigarette Use Former User       E-Cigarette/Vaping Substances    Nicotine Yes       I have reviewed and agree with the history as documented.     70-year-old female with history of lung cancer (IV chemo but no ports) presenting to the ER with concern that she is constipated.  Patient has had abdominal pain also for the past few days.  Constipation began gradually over the past few days.  Patient denies any blood in her stool.   Patient had multiple bouts of diarrhea last week after  the chemotherapy.  This was associated with nausea.  She took Imodium 1 time last week despite having multiple episodes of diarrhea.  No fevers reported.        Constipation  Associated symptoms: abdominal pain    Associated symptoms: no back pain and no fever        Review of Systems   Constitutional:  Negative for fever.   Cardiovascular:  Negative for leg swelling.   Gastrointestinal:  Positive for abdominal pain, constipation and rectal pain. Negative for blood in stool.   Musculoskeletal:  Negative for back pain.   Skin:  Negative for rash.   Neurological:  Negative for syncope.           Objective       ED Triage Vitals   Temperature Pulse Blood Pressure Respirations SpO2 Patient Position - Orthostatic VS   07/19/25 1036 07/19/25 1035 07/19/25 1035 07/19/25 1035 07/19/25 1035 07/19/25 1035   (!) 97.3 °F (36.3 °C) 92 114/69 18 100 % Sitting      Temp Source Heart Rate Source BP Location FiO2 (%) Pain Score    07/19/25 1036 07/19/25 1035 07/19/25 1035 -- 07/19/25 1035    Oral Monitor Right arm  8      Vitals      Date and Time Temp Pulse SpO2 Resp BP Pain Score FACES Pain Rating User   07/19/25 1545 -- 81 100 % 18 124/62 -- -- SL   07/19/25 1045 -- 82 100 % -- 114/69 -- -- BM   07/19/25 1036 97.3 °F (36.3 °C) -- -- -- -- -- -- MD   07/19/25 1035 -- 92 100 % 18 114/69 8 -- MD            Physical Exam  Vitals and nursing note reviewed.   Constitutional:       General: She is not in acute distress.     Appearance: She is well-developed.   HENT:      Mouth/Throat:      Mouth: Mucous membranes are moist.     Eyes:      Conjunctiva/sclera: Conjunctivae normal.     Pulmonary:      Effort: Pulmonary effort is normal. No respiratory distress.   Abdominal:      General: There is no distension.      Palpations: Abdomen is soft.      Tenderness: There is abdominal tenderness.     Musculoskeletal:         General: No swelling.     Skin:     General: Skin is warm and dry.     Neurological:      Mental Status: She is alert.      Psychiatric:         Mood and Affect: Mood normal.         Results Reviewed       Procedure Component Value Units Date/Time    UA w Reflex to Microscopic w Reflex to Culture [[id number]] Collected: 07/19/25 1209    Lab Status: Final result Specimen: Urine, Clean Catch Updated: 07/19/25 1218     Color, UA Yellow     Clarity, UA Clear     Specific Gravity, UA <=1.005     pH, UA 6.0     Leukocytes, UA Negative     Nitrite, UA Negative     Protein, UA Negative mg/dl      Glucose, UA Negative mg/dl      Ketones, UA Negative mg/dl      Urobilinogen, UA 0.2 E.U./dl      Bilirubin, UA Negative     Occult Blood, UA Negative    Manual Differential(PHLEBS Do Not Order) [[id number]]  (Abnormal) Collected: 07/19/25 1108    Lab Status: Final result Specimen: Blood from Arm, Right Updated: 07/19/25 1208     Segmented % 29 %      Bands % 1 %      Lymphocytes % 40 %      Monocytes % 19 %      Eosinophils % 4 %      Basophils % 2 %      Metamyelocytes % 1 %      Myelocytes % 2 %      Atypical Lymphocytes % 2 %      Absolute Neutrophils 0.71 Thousand/uL      Absolute Lymphocytes 1.00 Thousand/uL      Absolute Monocytes 0.45 Thousand/uL      Absolute Eosinophils 0.09 Thousand/uL      Absolute Basophils 0.05 Thousand/uL      Absolute Metamyelocytes 0.02 Thousand/uL      Absolute Myelocytes 0.05 Thousand/uL      Total Counted --     RBC Morphology Present     Platelet Estimate Increased     Large Platelet Present     Anisocytosis Present     Macrocytes Present     Spherocytes Present    CMP [[id number]]  (Abnormal) Collected: 07/19/25 1108    Lab Status: Final result Specimen: Blood from Arm, Right Updated: 07/19/25 1132     Sodium 138 mmol/L      Potassium 3.2 mmol/L      Chloride 100 mmol/L      CO2 27 mmol/L      ANION GAP 11 mmol/L      BUN 13 mg/dL      Creatinine 0.85 mg/dL      Glucose 98 mg/dL      Calcium 8.7 mg/dL      AST 23 U/L      ALT 21 U/L      Alkaline Phosphatase 78 U/L      Total Protein 6.9 g/dL      Albumin 3.8 g/dL       Total Bilirubin 0.39 mg/dL      eGFR 70 ml/min/1.73sq m     Narrative:      [employer] guidelines for Chronic Kidney Disease (CKD):     Stage 1 with normal or high GFR (GFR > 90 mL/min/1.73 square meters)    Stage 2 Mild CKD (GFR = 60-89 mL/min/1.73 square meters)    Stage 3A Moderate CKD (GFR = 45-59 mL/min/1.73 square meters)    Stage 3B Moderate CKD (GFR = 30-44 mL/min/1.73 square meters)    Stage 4 Severe CKD (GFR = 15-29 mL/min/1.73 square meters)    Stage 5 End Stage CKD (GFR <15 mL/min/1.73 square meters)  Note: GFR calculation is accurate only with a steady state creatinine    Lipase [[id number]]  (Abnormal) Collected: 07/19/25 1108    Lab Status: Final result Specimen: Blood from Arm, Right Updated: 07/19/25 1132     Lipase 123 u/L     CBC and differential [[id number]]  (Abnormal) Collected: 07/19/25 1108    Lab Status: Final result Specimen: Blood from Arm, Right Updated: 07/19/25 1115     WBC 2.37 Thousand/uL      RBC 2.90 Million/uL      Hemoglobin 9.6 g/dL      Hematocrit 28.4 %      MCV 98 fL      MCH 33.1 pg      MCHC 33.8 g/dL      RDW 13.6 %      MPV 9.3 fL      Platelets 569 Thousands/uL             CT abdomen pelvis with contrast   Final Interpretation by Doni Gonzalez MD (07/19 1450)      1.  Moderate stool retention in the colon. Suggestion of distal rectal wall thickening with mild perirectal fat stranding. Mild rectal distention with stool. Findings may represent a mild proctitis/stercoral colitis. Correlate clinically.   2.  Cholelithiasis.   3.  Bilateral nonobstructing renal calculi.         Workstation performed: [id number]             Procedures    ED Medication and Procedure Management   Prior to Admission Medications   Prescriptions Last Dose Informant Patient Reported? Taking?   EPINEPHrine (EPIPEN) 0.3 mg/0.3 mL SOAJ  Self Yes No   Sig: Inject as directed   Patient not taking: Reported on 5/22/2025   acetaminophen (TYLENOL) 500 mg tablet  Self Yes No   Sig:  Take 1 tablet by mouth every 6 (six) hours as needed   albuterol (Proventil HFA) 90 mcg/act inhaler  Self No No   Sig: Inhale 2 puffs every 6 (six) hours as needed for wheezing   brimonidine (ALPHAGAN P) 0.15 % ophthalmic solution  Self Yes No   Sig: Apply to eye   brimonidine-timolol (COMBIGAN) 0.2-0.5 %  Self Yes No   Sig: Administer to both eyes every 12 (twelve) hours   carvedilol (COREG) 6.25 mg tablet  Self Yes No   Sig: Take 6.25 mg by mouth in the morning and 6.25 mg in the evening.   fluticasone-umeclidinium-vilanterol (Trelegy Ellipta) 200-62.5-25 mcg/actuation AEPB inhaler   No No   Sig: Inhale 1 puff daily Rinse mouth after use.   folic acid (FOLVITE) 1 mg tablet  Self No No   Sig: Take 1 tablet (1,000 mcg total) by mouth daily   furosemide (LASIX) 40 mg tablet  Self No No   Sig: Take 1 tablet (40 mg total) by mouth daily as needed (lower leg edema)   ipratropium-albuterol (DUO-NEB) 0.5-2.5 mg/3 mL nebulizer solution  Self No No   Sig: Take 3 mL by nebulization 4 (four) times a day   loratadine (CLARITIN) 10 mg tablet  Self Yes No   Sig: Take 10 mg by mouth in the morning.   losartan (COZAAR) 25 mg tablet  Self No No   Sig: Take 1 tablet (25 mg total) by mouth daily   nystatin (MYCOSTATIN) 500,000 units/5 mL suspension   No No   Sig: Swish 5ml in mouth as long as possible and then swallow, 4 times per day. Do not eat or drink anything 30 minutes after taking this medication.   ondansetron (ZOFRAN) 8 mg tablet  Self No No   Sig: Take 1 tablet (8 mg total) by mouth every 8 (eight) hours as needed for nausea   prednisoLONE acetate (PRED MILD) 0.12 % ophthalmic suspension  Self Yes No   Sig: Administer 1 drop into the left eye in the morning and 1 drop at noon and 1 drop in the evening and 1 drop before bedtime.   rosuvastatin (CRESTOR) 40 MG tablet  Self No No   Sig: Take 1 tablet (40 mg total) by mouth daily      Facility-Administered Medications: None     Discharge Medication List as of 7/19/2025  4:23 PM         CONTINUE these medications which have NOT CHANGED    Details   nystatin (MYCOSTATIN) 500,000 units/5 mL suspension Swish 5ml in mouth as long as possible and then swallow, 4 times per day. Do not eat or drink anything 30 minutes after taking this medication., Normal      acetaminophen (TYLENOL) 500 mg tablet Take 1 tablet by mouth every 6 (six) hours as needed, Historical Med      albuterol (Proventil HFA) 90 mcg/act inhaler Inhale 2 puffs every 6 (six) hours as needed for wheezing, Starting Tue 1/14/2025, Normal      brimonidine (ALPHAGAN P) 0.15 % ophthalmic solution Apply to eye, Historical Med      brimonidine-timolol (COMBIGAN) 0.2-0.5 % Administer to both eyes every 12 (twelve) hours, Historical Med      carvedilol (COREG) 6.25 mg tablet Take 6.25 mg by mouth in the morning and 6.25 mg in the evening., Starting Thu 10/3/2024, Historical Med      EPINEPHrine (EPIPEN) 0.3 mg/0.3 mL SOAJ Inject as directed, Starting Mon 4/25/2011, Historical Med      fluticasone-umeclidinium-vilanterol (Trelegy Ellipta) 200-62.5-25 mcg/actuation AEPB inhaler Inhale 1 puff daily Rinse mouth after use., Starting Tue 6/17/2025, Until Fri 6/12/2026, Normal      folic acid (FOLVITE) 1 mg tablet Take 1 tablet (1,000 mcg total) by mouth daily, Starting Fri 4/25/2025, Normal      furosemide (LASIX) 40 mg tablet Take 1 tablet (40 mg total) by mouth daily as needed (lower leg edema), Starting Mon 5/19/2025, Normal      ipratropium-albuterol (DUO-NEB) 0.5-2.5 mg/3 mL nebulizer solution Take 3 mL by nebulization 4 (four) times a day, Starting Thu 4/3/2025, Normal      loratadine (CLARITIN) 10 mg tablet Take 10 mg by mouth in the morning., Starting Fri 2/28/2025, Historical Med      losartan (COZAAR) 25 mg tablet Take 1 tablet (25 mg total) by mouth daily, Starting Fri 2/14/2025, Normal      ondansetron (ZOFRAN) 8 mg tablet Take 1 tablet (8 mg total) by mouth every 8 (eight) hours as needed for nausea, Starting Fri 4/25/2025, Normal       prednisoLONE acetate (PRED MILD) 0.12 % ophthalmic suspension Administer 1 drop into the left eye in the morning and 1 drop at noon and 1 drop in the evening and 1 drop before bedtime., Historical Med      rosuvastatin (CRESTOR) 40 MG tablet Take 1 tablet (40 mg total) by mouth daily, Starting Mon 4/14/2025, Normal           No discharge procedures on file.  ED SEPSIS DOCUMENTATION   Time reflects when diagnosis was documented in both MDM as applicable and the Disposition within this note       Time User Action Codes Description Comment    7/19/2025  4:20 PM Rex Lira [K59.00] Constipation, unspecified constipation type     7/19/2025  4:20 PM Rex Lira [K62.89] Proctitis                    [1]   Past Medical History:  Diagnosis Date    BRCA1 negative     patient states that BRCA 1 came back indeterminate    BRCA2 negative     Family history of breast cancer 11/18/2019    Glaucoma     History of transfusion     as teen    Hyperlipidemia     Hypertension     Iris nevus     Lung cancer (HCC)     Melanoma, intraocular, iris, 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    iris nevus tumor removed    Nonobstructive atherosclerosis of coronary artery     Pneumonia 11/2024    ED 2/10/2025    PONV (postoperative nausea and vomiting)    [2]   Past Surgical History:  Procedure Laterality Date    BREAST EXCISIONAL BIOPSY Left 1975    age 19; benign lesion    BREAST EXCISIONAL BIOPSY Left 01/04/2000    atypical hyperplasia    BREAST EXCISIONAL BIOPSY Left 01/30/2002    atypical intraductal hyperplasia    BREAST EXCISIONAL BIOPSY Left 04/17/2002    intraductal epithelial hyperplasia and papillomatosis with focal atypia    BREAST EXCISIONAL BIOPSY Left 02/25/2003    foci of atypical intraductal hyperplasia    BREAST EXCISIONAL BIOPSY Left 06/05/2006    fibrocystic change/fibrous mastopathy    BREAST EXCISIONAL BIOPSY Left 02/25/2008    fibrocystic changes    CARDIAC CATHETERIZATION N/A 02/11/2025    Procedure: Left Heart Catherization;   Surgeon: Yocasta Elias DO;  Location: [facility];  Service: Cardiology    CARDIAC CATHETERIZATION N/A 2025    Procedure: Cardiac Coronary Angiogram;  Surgeon: Yocasta Elias DO;  Location: [facility];  Service: Cardiology    COLONOSCOPY      DILATION AND CURETTAGE OF UTERUS      EYE SURGERY Left 2006    HYSTERECTOMY  2000    LYMPH NODE BIOPSY Right 2025    Procedure: RIGHT LYMPH NODE  BIOPSY WITH FROZEN SECTION ANALYSIS;  Surgeon: Antonio Carter MD;  Location:  MAIN OR;  Service: ENT    OOPHORECTOMY Bilateral 2013    approximately    REFRACTIVE SURGERY      US GUIDANCE BREAST BIOPSY LEFT EACH ADDITIONAL Left 2008    benign breast tissue with stromal fibrosis and sclerosing adenosis    US GUIDED BREAST BIOPSY LEFT COMPLETE Left 2008    benign breast tissue with stromal fibrosis and sclerosing adenosis   [3]   Family History  Problem Relation Name Age of Onset    Alzheimer's disease Mother      No Known Problems Father          never recovered after a surgery for his stomach    Breast cancer Sister Keri 42    No Known Problems Sister Lorraine     Lung cancer Sister Kira          from stomach cancer    No Known Problems Maternal Aunt Ladan     Heart defect Maternal Aunt Keri F     Breast cancer Paternal Aunt Britni         82    Breast cancer Maternal Grandmother  38    No Known Problems Maternal Grandfather      No Known Problems Paternal Grandmother      No Known Problems Paternal Grandfather      No Known Problems Daughter Dianna     Breast cancer Cousin Cheryl 44    Breast cancer Cousin Bonita 45    Breast cancer additional onset Neg Hx      BRCA2 Positive Neg Hx      BRCA2 Negative Neg Hx      BRCA1 Positive Neg Hx      BRCA1 Negative Neg Hx      BRCA 1/2 Neg Hx      Colon cancer Neg Hx      Ovarian cancer Neg Hx      Endometrial cancer Neg Hx     [4]   Social History  Tobacco Use    Smoking status: Some Days     Current packs/day: 0.00      Average packs/day: 0.3 packs/day for 52.1 years (13.0 ttl pk-yrs)     Types: Cigarettes     Start date:      Last attempt to quit: 2/10/2025     Years since quittin.4    Smokeless tobacco: Never   Vaping Use    Vaping status: Former    Substances: Nicotine   Substance Use Topics    Alcohol use: Not Currently     Alcohol/week: 3.0 standard drinks of alcohol     Types: 3 Glasses of wine per week     Comment: socially    Drug use: Never        Rex MERAZ MD  [phone]

## 2025-07-19 NOTE — DISCHARGE INSTRUCTIONS
As discussed, the CAT scan showed possible proctitis.  We have prescribed Flagyl which is an antibiotic.    The oral contrast on the CAT scan should act as a laxative and you should have a bowel movement later today.  If you do not you may benefit from a half dose of magnesium citrate.  This is a laxative that can be purchased over-the-counter.    Return to the ER for new or worsening symptoms.   Left message on the machine. Continue with the same dose and an INR in one month.

## 2025-07-20 RX ORDER — METRONIDAZOLE 500 MG/1
500 TABLET ORAL EVERY 8 HOURS SCHEDULED
Qty: 21 TABLET | Refills: 0 | Status: SHIPPED | OUTPATIENT
Start: 2025-07-20 | End: 2025-07-27

## 2025-07-23 ENCOUNTER — TELEPHONE (OUTPATIENT)
Dept: HEMATOLOGY ONCOLOGY | Facility: CLINIC | Age: 70
End: 2025-07-23

## 2025-07-23 NOTE — TELEPHONE ENCOUNTER
Spoke to patient regarding upcoming appointment with the doctor for 8/5/25.  Provider will be in a meeting and her time was changed from 120 pm to 4 pm.  Patient aware and ok with the change.

## 2025-07-25 ENCOUNTER — HOSPITAL ENCOUNTER (OUTPATIENT)
Dept: INFUSION CENTER | Facility: HOSPITAL | Age: 70
End: 2025-07-25

## 2025-07-31 RX ORDER — SODIUM CHLORIDE 9 MG/ML
20 INJECTION, SOLUTION INTRAVENOUS ONCE
Status: CANCELLED | OUTPATIENT
Start: 2025-08-07

## 2025-08-05 ENCOUNTER — TELEPHONE (OUTPATIENT)
Dept: HEMATOLOGY ONCOLOGY | Facility: CLINIC | Age: 70
End: 2025-08-05

## 2025-08-05 ENCOUNTER — APPOINTMENT (OUTPATIENT)
Dept: LAB | Facility: HOSPITAL | Age: 70
End: 2025-08-05
Payer: MEDICARE

## 2025-08-05 ENCOUNTER — OFFICE VISIT (OUTPATIENT)
Dept: HEMATOLOGY ONCOLOGY | Facility: CLINIC | Age: 70
End: 2025-08-05
Payer: MEDICARE

## 2025-08-05 VITALS
SYSTOLIC BLOOD PRESSURE: 144 MMHG | DIASTOLIC BLOOD PRESSURE: 84 MMHG | HEIGHT: 67 IN | TEMPERATURE: 97.9 F | OXYGEN SATURATION: 99 % | HEART RATE: 67 BPM | BODY MASS INDEX: 16.32 KG/M2 | WEIGHT: 104 LBS

## 2025-08-05 DIAGNOSIS — T45.1X5A CHEMOTHERAPY INDUCED NAUSEA AND VOMITING: ICD-10-CM

## 2025-08-05 DIAGNOSIS — C34.12 PRIMARY ADENOCARCINOMA OF UPPER LOBE OF LEFT LUNG (HCC): ICD-10-CM

## 2025-08-05 DIAGNOSIS — C34.12 PRIMARY ADENOCARCINOMA OF UPPER LOBE OF LEFT LUNG (HCC): Primary | Chronic | ICD-10-CM

## 2025-08-05 DIAGNOSIS — R11.2 CHEMOTHERAPY INDUCED NAUSEA AND VOMITING: ICD-10-CM

## 2025-08-05 LAB
ALBUMIN SERPL BCG-MCNC: 4 G/DL (ref 3.5–5)
ALP SERPL-CCNC: 54 U/L (ref 34–104)
ALT SERPL W P-5'-P-CCNC: 10 U/L (ref 7–52)
ANION GAP SERPL CALCULATED.3IONS-SCNC: 11 MMOL/L (ref 4–13)
AST SERPL W P-5'-P-CCNC: 20 U/L (ref 13–39)
BASOPHILS # BLD AUTO: 0.12 THOUSANDS/ÂΜL (ref 0–0.1)
BASOPHILS NFR BLD AUTO: 2 % (ref 0–1)
BILIRUB SERPL-MCNC: 0.48 MG/DL (ref 0.2–1)
BUN SERPL-MCNC: 12 MG/DL (ref 5–25)
CALCIUM SERPL-MCNC: 9 MG/DL (ref 8.4–10.2)
CHLORIDE SERPL-SCNC: 100 MMOL/L (ref 96–108)
CO2 SERPL-SCNC: 26 MMOL/L (ref 21–32)
CREAT SERPL-MCNC: 0.83 MG/DL (ref 0.6–1.3)
EOSINOPHIL # BLD AUTO: 0.18 THOUSAND/ÂΜL (ref 0–0.61)
EOSINOPHIL NFR BLD AUTO: 2 % (ref 0–6)
ERYTHROCYTE [DISTWIDTH] IN BLOOD BY AUTOMATED COUNT: 16.3 % (ref 11.6–15.1)
GFR SERPL CREATININE-BSD FRML MDRD: 72 ML/MIN/1.73SQ M
GLUCOSE SERPL-MCNC: 90 MG/DL (ref 65–140)
HCT VFR BLD AUTO: 28.7 % (ref 34.8–46.1)
HGB BLD-MCNC: 9.5 G/DL (ref 11.5–15.4)
IMM GRANULOCYTES # BLD AUTO: 0.02 THOUSAND/UL (ref 0–0.2)
IMM GRANULOCYTES NFR BLD AUTO: 0 % (ref 0–2)
LYMPHOCYTES # BLD AUTO: 0.67 THOUSANDS/ÂΜL (ref 0.6–4.47)
LYMPHOCYTES NFR BLD AUTO: 9 % (ref 14–44)
MCH RBC QN AUTO: 34.8 PG (ref 26.8–34.3)
MCHC RBC AUTO-ENTMCNC: 33.1 G/DL (ref 31.4–37.4)
MCV RBC AUTO: 105 FL (ref 82–98)
MONOCYTES # BLD AUTO: 0.99 THOUSAND/ÂΜL (ref 0.17–1.22)
MONOCYTES NFR BLD AUTO: 13 % (ref 4–12)
NEUTROPHILS # BLD AUTO: 5.56 THOUSANDS/ÂΜL (ref 1.85–7.62)
NEUTS SEG NFR BLD AUTO: 74 % (ref 43–75)
NRBC BLD AUTO-RTO: 0 /100 WBCS
PLATELET # BLD AUTO: 284 THOUSANDS/UL (ref 149–390)
PMV BLD AUTO: 9.7 FL (ref 8.9–12.7)
POTASSIUM SERPL-SCNC: 3.4 MMOL/L (ref 3.5–5.3)
PROT SERPL-MCNC: 6.8 G/DL (ref 6.4–8.4)
RBC # BLD AUTO: 2.73 MILLION/UL (ref 3.81–5.12)
SODIUM SERPL-SCNC: 137 MMOL/L (ref 135–147)
T3FREE SERPL-MCNC: 2.82 PG/ML (ref 2.5–3.9)
T4 FREE SERPL-MCNC: 0.83 NG/DL (ref 0.61–1.12)
TSH SERPL DL<=0.05 MIU/L-ACNC: 5.07 UIU/ML (ref 0.45–4.5)
WBC # BLD AUTO: 7.54 THOUSAND/UL (ref 4.31–10.16)

## 2025-08-05 PROCEDURE — 84443 ASSAY THYROID STIM HORMONE: CPT

## 2025-08-05 PROCEDURE — 80053 COMPREHEN METABOLIC PANEL: CPT

## 2025-08-05 PROCEDURE — 84439 ASSAY OF FREE THYROXINE: CPT

## 2025-08-05 PROCEDURE — 99215 OFFICE O/P EST HI 40 MIN: CPT | Performed by: INTERNAL MEDICINE

## 2025-08-05 PROCEDURE — 85025 COMPLETE CBC W/AUTO DIFF WBC: CPT

## 2025-08-05 PROCEDURE — G2211 COMPLEX E/M VISIT ADD ON: HCPCS | Performed by: INTERNAL MEDICINE

## 2025-08-05 PROCEDURE — 36415 COLL VENOUS BLD VENIPUNCTURE: CPT

## 2025-08-05 PROCEDURE — 84481 FREE ASSAY (FT-3): CPT

## 2025-08-07 ENCOUNTER — HOSPITAL ENCOUNTER (OUTPATIENT)
Dept: INFUSION CENTER | Facility: HOSPITAL | Age: 70
Discharge: HOME | End: 2025-08-07
Attending: INTERNAL MEDICINE
Payer: MEDICARE

## 2025-08-07 DIAGNOSIS — C34.12 PRIMARY ADENOCARCINOMA OF UPPER LOBE OF LEFT LUNG (HCC): ICD-10-CM

## 2025-08-07 DIAGNOSIS — R11.2 CHEMOTHERAPY INDUCED NAUSEA AND VOMITING: Primary | ICD-10-CM

## 2025-08-07 DIAGNOSIS — T45.1X5A CHEMOTHERAPY INDUCED NAUSEA AND VOMITING: Primary | ICD-10-CM

## 2025-08-08 ENCOUNTER — HOSPITAL ENCOUNTER (OUTPATIENT)
Dept: INFUSION CENTER | Facility: HOSPITAL | Age: 70
End: 2025-08-08
Payer: MEDICARE

## 2025-08-20 ENCOUNTER — APPOINTMENT (EMERGENCY)
Dept: CT IMAGING | Facility: HOSPITAL | Age: 70
End: 2025-08-20
Payer: MEDICARE

## 2025-08-20 ENCOUNTER — HOSPITAL ENCOUNTER (EMERGENCY)
Facility: HOSPITAL | Age: 70
Discharge: HOME | End: 2025-08-20
Attending: EMERGENCY MEDICINE | Admitting: EMERGENCY MEDICINE
Payer: MEDICARE

## 2025-08-20 VITALS
BODY MASS INDEX: 16.26 KG/M2 | SYSTOLIC BLOOD PRESSURE: 156 MMHG | OXYGEN SATURATION: 100 % | HEART RATE: 75 BPM | HEIGHT: 67 IN | DIASTOLIC BLOOD PRESSURE: 70 MMHG | RESPIRATION RATE: 16 BRPM | TEMPERATURE: 97.7 F

## 2025-08-20 DIAGNOSIS — D69.6 THROMBOCYTOPENIA (HCC): ICD-10-CM

## 2025-08-20 DIAGNOSIS — R10.9 FLANK PAIN: Primary | ICD-10-CM

## 2025-08-20 DIAGNOSIS — N39.0 UTI (URINARY TRACT INFECTION): ICD-10-CM

## 2025-08-20 LAB
2HR DELTA HS TROPONIN: 0 NG/L
ALBUMIN SERPL BCG-MCNC: 3.9 G/DL (ref 3.5–5)
ALP SERPL-CCNC: 63 U/L (ref 34–104)
ALT SERPL W P-5'-P-CCNC: 30 U/L (ref 7–52)
ANION GAP SERPL CALCULATED.3IONS-SCNC: 11 MMOL/L (ref 4–13)
APTT PPP: 27 SECONDS (ref 23–34)
AST SERPL W P-5'-P-CCNC: 58 U/L (ref 13–39)
ATRIAL RATE: 85 BPM
BASOPHILS # BLD AUTO: 0.01 THOUSANDS/ÂΜL (ref 0–0.1)
BASOPHILS NFR BLD AUTO: 0 % (ref 0–1)
BILIRUB SERPL-MCNC: 0.37 MG/DL (ref 0.2–1)
BILIRUB UR QL STRIP: NEGATIVE
BNP SERPL-MCNC: 82 PG/ML (ref 0–100)
BUN SERPL-MCNC: 12 MG/DL (ref 5–25)
CALCIUM SERPL-MCNC: 9.4 MG/DL (ref 8.4–10.2)
CARDIAC TROPONIN I PNL SERPL HS: 4 NG/L (ref ?–50)
CARDIAC TROPONIN I PNL SERPL HS: 4 NG/L (ref ?–50)
CHLORIDE SERPL-SCNC: 101 MMOL/L (ref 96–108)
CLARITY UR: CLEAR
CO2 SERPL-SCNC: 24 MMOL/L (ref 21–32)
COLOR UR: YELLOW
CREAT SERPL-MCNC: 0.92 MG/DL (ref 0.6–1.3)
EOSINOPHIL # BLD AUTO: 0.34 THOUSAND/ÂΜL (ref 0–0.61)
EOSINOPHIL NFR BLD AUTO: 14 % (ref 0–6)
ERYTHROCYTE [DISTWIDTH] IN BLOOD BY AUTOMATED COUNT: 14.2 % (ref 11.6–15.1)
GFR SERPL CREATININE-BSD FRML MDRD: 63 ML/MIN/1.73SQ M
GLUCOSE SERPL-MCNC: 91 MG/DL (ref 65–140)
GLUCOSE UR STRIP-MCNC: NEGATIVE MG/DL
HCT VFR BLD AUTO: 26 % (ref 34.8–46.1)
HGB BLD-MCNC: 8.6 G/DL (ref 11.5–15.4)
HGB UR QL STRIP.AUTO: NEGATIVE
IMM GRANULOCYTES # BLD AUTO: 0.02 THOUSAND/UL (ref 0–0.2)
IMM GRANULOCYTES NFR BLD AUTO: 1 % (ref 0–2)
INR PPP: 0.98 (ref 0.85–1.19)
KETONES UR STRIP-MCNC: NEGATIVE MG/DL
LEUKOCYTE ESTERASE UR QL STRIP: NEGATIVE
LIPASE SERPL-CCNC: 56 U/L (ref 11–82)
LYMPHOCYTES # BLD AUTO: 0.56 THOUSANDS/ÂΜL (ref 0.6–4.47)
LYMPHOCYTES NFR BLD AUTO: 24 % (ref 14–44)
MCH RBC QN AUTO: 34.5 PG (ref 26.8–34.3)
MCHC RBC AUTO-ENTMCNC: 33.1 G/DL (ref 31.4–37.4)
MCV RBC AUTO: 104 FL (ref 82–98)
MONOCYTES # BLD AUTO: 0.89 THOUSAND/ÂΜL (ref 0.17–1.22)
MONOCYTES NFR BLD AUTO: 38 % (ref 4–12)
NEUTROPHILS # BLD AUTO: 0.54 THOUSANDS/ÂΜL (ref 1.85–7.62)
NEUTS SEG NFR BLD AUTO: 23 % (ref 43–75)
NITRITE UR QL STRIP: NEGATIVE
NRBC BLD AUTO-RTO: 0 /100 WBCS
P AXIS: 68 DEGREES
PH UR STRIP.AUTO: 6.5 [PH]
PLATELET # BLD AUTO: 125 THOUSANDS/UL (ref 149–390)
PMV BLD AUTO: 10 FL (ref 8.9–12.7)
POTASSIUM SERPL-SCNC: 3.6 MMOL/L (ref 3.5–5.3)
PR INTERVAL: 150 MS
PROT SERPL-MCNC: 7.1 G/DL (ref 6.4–8.4)
PROT UR STRIP-MCNC: NEGATIVE MG/DL
PROTHROMBIN TIME: 13.4 SECONDS (ref 12.3–15)
QRS AXIS: 37 DEGREES
QRSD INTERVAL: 84 MS
QT INTERVAL: 408 MS
QTC INTERVAL: 485 MS
RBC # BLD AUTO: 2.49 MILLION/UL (ref 3.81–5.12)
SODIUM SERPL-SCNC: 136 MMOL/L (ref 135–147)
SP GR UR STRIP.AUTO: <=1.005 (ref 1–1.03)
T WAVE AXIS: 37 DEGREES
UROBILINOGEN UR QL STRIP.AUTO: 0.2 E.U./DL
VENTRICULAR RATE: 85 BPM
WBC # BLD AUTO: 2.36 THOUSAND/UL (ref 4.31–10.16)

## 2025-08-20 PROCEDURE — 71275 CT ANGIOGRAPHY CHEST: CPT

## 2025-08-20 PROCEDURE — 84484 ASSAY OF TROPONIN QUANT: CPT | Performed by: EMERGENCY MEDICINE

## 2025-08-20 PROCEDURE — 96376 TX/PRO/DX INJ SAME DRUG ADON: CPT

## 2025-08-20 PROCEDURE — 85730 THROMBOPLASTIN TIME PARTIAL: CPT | Performed by: EMERGENCY MEDICINE

## 2025-08-20 PROCEDURE — 83690 ASSAY OF LIPASE: CPT | Performed by: EMERGENCY MEDICINE

## 2025-08-20 PROCEDURE — 93005 ELECTROCARDIOGRAM TRACING: CPT

## 2025-08-20 PROCEDURE — 83880 ASSAY OF NATRIURETIC PEPTIDE: CPT | Performed by: EMERGENCY MEDICINE

## 2025-08-20 PROCEDURE — 96374 THER/PROPH/DIAG INJ IV PUSH: CPT

## 2025-08-20 PROCEDURE — 99285 EMERGENCY DEPT VISIT HI MDM: CPT | Performed by: EMERGENCY MEDICINE

## 2025-08-20 PROCEDURE — 80053 COMPREHEN METABOLIC PANEL: CPT | Performed by: EMERGENCY MEDICINE

## 2025-08-20 PROCEDURE — 85025 COMPLETE CBC W/AUTO DIFF WBC: CPT | Performed by: EMERGENCY MEDICINE

## 2025-08-20 PROCEDURE — 36415 COLL VENOUS BLD VENIPUNCTURE: CPT | Performed by: EMERGENCY MEDICINE

## 2025-08-20 PROCEDURE — 99285 EMERGENCY DEPT VISIT HI MDM: CPT

## 2025-08-20 PROCEDURE — 74177 CT ABD & PELVIS W/CONTRAST: CPT

## 2025-08-20 PROCEDURE — 85610 PROTHROMBIN TIME: CPT | Performed by: EMERGENCY MEDICINE

## 2025-08-20 PROCEDURE — 81003 URINALYSIS AUTO W/O SCOPE: CPT | Performed by: EMERGENCY MEDICINE

## 2025-08-20 RX ORDER — OXYCODONE HYDROCHLORIDE 5 MG/1
5 TABLET ORAL EVERY 6 HOURS PRN
Qty: 9 TABLET | Refills: 0 | Status: SHIPPED | OUTPATIENT
Start: 2025-08-20 | End: 2025-08-30

## 2025-08-20 RX ORDER — CEFPODOXIME PROXETIL 200 MG/1
200 TABLET, FILM COATED ORAL 2 TIMES DAILY
Qty: 20 TABLET | Refills: 0 | Status: SHIPPED | OUTPATIENT
Start: 2025-08-20 | End: 2025-08-30

## 2025-08-20 RX ORDER — MORPHINE SULFATE 4 MG/ML
4 INJECTION, SOLUTION INTRAMUSCULAR; INTRAVENOUS ONCE
Status: COMPLETED | OUTPATIENT
Start: 2025-08-20 | End: 2025-08-20

## 2025-08-20 RX ADMIN — MORPHINE SULFATE 4 MG: 4 INJECTION INTRAVENOUS at 14:22

## 2025-08-20 RX ADMIN — MORPHINE SULFATE 4 MG: 4 INJECTION INTRAVENOUS at 11:30

## 2025-08-20 RX ADMIN — IOHEXOL 100 ML: 350 INJECTION, SOLUTION INTRAVENOUS at 12:07

## 2025-08-22 ENCOUNTER — TELEPHONE (OUTPATIENT)
Dept: HEMATOLOGY ONCOLOGY | Facility: CLINIC | Age: 70
End: 2025-08-22

## (undated) DEVICE — SUT CHROMIC 4-0 PS-2 18 IN 1637G

## (undated) DEVICE — DISPOSABLE OR TOWEL: Brand: CARDINAL HEALTH

## (undated) DEVICE — GUIDEWIRE WHOLEY HI TORQUE INTERM MOD J .035 145CM

## (undated) DEVICE — DGW .035 FC J3MM 260CM TEF: Brand: EMERALD

## (undated) DEVICE — PENCIL ELECTROSURG E-Z CLEAN -0035H

## (undated) DEVICE — TR BAND RADIAL ARTERY COMPRESSION DEVICE: Brand: TR BAND

## (undated) DEVICE — SINGLE PORT MANIFOLD: Brand: NEPTUNE 2

## (undated) DEVICE — PEANUT 5 PK

## (undated) DEVICE — ELECTRODE NEEDLE MOD E-Z CLEAN 2.75IN 7CM -0013M

## (undated) DEVICE — RADIFOCUS OPTITORQUE ANGIOGRAPHIC CATHETER: Brand: OPTITORQUE

## (undated) DEVICE — TUBING SUCTION 5MM X 12 FT

## (undated) DEVICE — NEEDLE 23G X 1 1/2 SAFETY-GLIDE THIN WALL

## (undated) DEVICE — REM POLYHESIVE ADULT PATIENT RETURN ELECTRODE: Brand: VALLEYLAB

## (undated) DEVICE — TIBURON SPLIT SHEET: Brand: CONVERTORS

## (undated) DEVICE — GLOVE INDICATOR PI UNDERGLOVE SZ 8 BLUE

## (undated) DEVICE — GLIDESHEATH SLENDER STAINLESS STEEL KIT: Brand: GLIDESHEATH SLENDER

## (undated) DEVICE — CAUTERY TIP POLISHER: Brand: DEVON

## (undated) DEVICE — BETHLEHEM UNIVERSAL OUTPATIENT: Brand: CARDINAL HEALTH

## (undated) DEVICE — TELFA NON-ADHERENT ABSORBENT DRESSING: Brand: TELFA

## (undated) DEVICE — SYRINGE 10ML LL

## (undated) DEVICE — INTENDED FOR TISSUE SEPARATION, AND OTHER PROCEDURES THAT REQUIRE A SHARP SURGICAL BLADE TO PUNCTURE OR CUT.: Brand: BARD-PARKER ® CARBON RIB-BACK BLADES

## (undated) DEVICE — MEDI-VAC YANK SUCT HNDL W/TPRD BULBOUS TIP: Brand: CARDINAL HEALTH